# Patient Record
Sex: MALE | Race: WHITE | Employment: OTHER | URBAN - NONMETROPOLITAN AREA
[De-identification: names, ages, dates, MRNs, and addresses within clinical notes are randomized per-mention and may not be internally consistent; named-entity substitution may affect disease eponyms.]

---

## 2017-02-20 ENCOUNTER — OFFICE VISIT (OUTPATIENT)
Dept: FAMILY MEDICINE CLINIC | Age: 62
End: 2017-02-20

## 2017-02-20 VITALS
SYSTOLIC BLOOD PRESSURE: 115 MMHG | HEART RATE: 72 BPM | HEIGHT: 70 IN | WEIGHT: 163 LBS | RESPIRATION RATE: 16 BRPM | BODY MASS INDEX: 23.34 KG/M2 | DIASTOLIC BLOOD PRESSURE: 75 MMHG

## 2017-02-20 DIAGNOSIS — E78.2 HYPERCHOLESTEROLEMIA WITH HYPERTRIGLYCERIDEMIA: ICD-10-CM

## 2017-02-20 DIAGNOSIS — I10 ESSENTIAL HYPERTENSION: Primary | ICD-10-CM

## 2017-02-20 DIAGNOSIS — M79.672 BILATERAL FOOT PAIN: ICD-10-CM

## 2017-02-20 DIAGNOSIS — M79.671 BILATERAL FOOT PAIN: ICD-10-CM

## 2017-02-20 DIAGNOSIS — E53.8 VITAMIN B12 DEFICIENCY NEUROPATHY (HCC): ICD-10-CM

## 2017-02-20 DIAGNOSIS — G63 VITAMIN B12 DEFICIENCY NEUROPATHY (HCC): ICD-10-CM

## 2017-02-20 PROCEDURE — 99214 OFFICE O/P EST MOD 30 MIN: CPT | Performed by: FAMILY MEDICINE

## 2017-02-20 ASSESSMENT — ENCOUNTER SYMPTOMS
WHEEZING: 0
SHORTNESS OF BREATH: 0

## 2017-04-24 ENCOUNTER — OFFICE VISIT (OUTPATIENT)
Dept: FAMILY MEDICINE CLINIC | Age: 62
End: 2017-04-24

## 2017-04-24 VITALS
TEMPERATURE: 97.7 F | BODY MASS INDEX: 23.48 KG/M2 | SYSTOLIC BLOOD PRESSURE: 138 MMHG | RESPIRATION RATE: 16 BRPM | DIASTOLIC BLOOD PRESSURE: 82 MMHG | HEART RATE: 70 BPM | OXYGEN SATURATION: 97 % | HEIGHT: 70 IN | WEIGHT: 164 LBS

## 2017-04-24 DIAGNOSIS — Z12.11 ENCOUNTER FOR SCREENING COLONOSCOPY: ICD-10-CM

## 2017-04-24 DIAGNOSIS — G62.9 PERIPHERAL POLYNEUROPATHY: ICD-10-CM

## 2017-04-24 DIAGNOSIS — I10 ESSENTIAL HYPERTENSION: ICD-10-CM

## 2017-04-24 DIAGNOSIS — E78.2 HYPERCHOLESTEROLEMIA WITH HYPERTRIGLYCERIDEMIA: Primary | ICD-10-CM

## 2017-04-24 PROCEDURE — 99214 OFFICE O/P EST MOD 30 MIN: CPT | Performed by: FAMILY MEDICINE

## 2017-04-24 RX ORDER — AMLODIPINE BESYLATE 5 MG/1
5 TABLET ORAL DAILY
Qty: 30 TABLET | Refills: 5 | Status: SHIPPED | OUTPATIENT
Start: 2017-04-24 | End: 2017-11-02 | Stop reason: SDUPTHER

## 2017-04-24 RX ORDER — FENOFIBRATE 160 MG/1
160 TABLET ORAL DAILY
Qty: 30 TABLET | Refills: 5 | Status: SHIPPED | OUTPATIENT
Start: 2017-04-24 | End: 2017-11-02 | Stop reason: SDUPTHER

## 2017-04-24 RX ORDER — ATORVASTATIN CALCIUM 80 MG/1
80 TABLET, FILM COATED ORAL NIGHTLY
Qty: 30 TABLET | Refills: 5 | Status: SHIPPED | OUTPATIENT
Start: 2017-04-24 | End: 2017-11-02 | Stop reason: SDUPTHER

## 2017-04-24 RX ORDER — PSYLLIUM HUSK 3.4 G/7G
1 POWDER ORAL DAILY
Qty: 30 TABLET | Refills: 5 | Status: SHIPPED | OUTPATIENT
Start: 2017-04-24 | End: 2017-11-02 | Stop reason: SDUPTHER

## 2017-04-24 ASSESSMENT — ENCOUNTER SYMPTOMS
SHORTNESS OF BREATH: 0
WHEEZING: 0
NAUSEA: 0
ABDOMINAL PAIN: 0

## 2017-11-02 ENCOUNTER — OFFICE VISIT (OUTPATIENT)
Dept: FAMILY MEDICINE CLINIC | Age: 62
End: 2017-11-02
Payer: COMMERCIAL

## 2017-11-02 VITALS
HEIGHT: 70 IN | HEART RATE: 65 BPM | WEIGHT: 164 LBS | DIASTOLIC BLOOD PRESSURE: 70 MMHG | SYSTOLIC BLOOD PRESSURE: 128 MMHG | RESPIRATION RATE: 16 BRPM | BODY MASS INDEX: 23.48 KG/M2

## 2017-11-02 DIAGNOSIS — I10 ESSENTIAL HYPERTENSION: Primary | ICD-10-CM

## 2017-11-02 DIAGNOSIS — G62.9 PERIPHERAL POLYNEUROPATHY: ICD-10-CM

## 2017-11-02 DIAGNOSIS — E53.8 VITAMIN B12 DEFICIENCY NEUROPATHY (HCC): ICD-10-CM

## 2017-11-02 DIAGNOSIS — E78.2 HYPERCHOLESTEROLEMIA WITH HYPERTRIGLYCERIDEMIA: ICD-10-CM

## 2017-11-02 DIAGNOSIS — Z12.11 COLON CANCER SCREENING: ICD-10-CM

## 2017-11-02 DIAGNOSIS — L50.9 URTICARIA: ICD-10-CM

## 2017-11-02 DIAGNOSIS — G63 VITAMIN B12 DEFICIENCY NEUROPATHY (HCC): ICD-10-CM

## 2017-11-02 PROCEDURE — G0444 DEPRESSION SCREEN ANNUAL: HCPCS | Performed by: NURSE PRACTITIONER

## 2017-11-02 PROCEDURE — 99214 OFFICE O/P EST MOD 30 MIN: CPT | Performed by: NURSE PRACTITIONER

## 2017-11-02 RX ORDER — FENOFIBRATE 160 MG/1
160 TABLET ORAL DAILY
Qty: 30 TABLET | Refills: 5 | Status: SHIPPED | OUTPATIENT
Start: 2017-11-02 | End: 2018-04-27 | Stop reason: SDUPTHER

## 2017-11-02 RX ORDER — PSYLLIUM HUSK 3.4 G/7G
1 POWDER ORAL DAILY
Qty: 30 TABLET | Refills: 5 | Status: SHIPPED | OUTPATIENT
Start: 2017-11-02 | End: 2018-04-28 | Stop reason: SDUPTHER

## 2017-11-02 RX ORDER — AMLODIPINE BESYLATE 5 MG/1
5 TABLET ORAL DAILY
Qty: 30 TABLET | Refills: 5 | Status: SHIPPED | OUTPATIENT
Start: 2017-11-02 | End: 2018-04-27 | Stop reason: SDUPTHER

## 2017-11-02 RX ORDER — ATORVASTATIN CALCIUM 80 MG/1
80 TABLET, FILM COATED ORAL NIGHTLY
Qty: 30 TABLET | Refills: 5 | Status: SHIPPED | OUTPATIENT
Start: 2017-11-02 | End: 2018-04-27 | Stop reason: SDUPTHER

## 2017-11-02 ASSESSMENT — PATIENT HEALTH QUESTIONNAIRE - PHQ9
5. POOR APPETITE OR OVEREATING: 3
9. THOUGHTS THAT YOU WOULD BE BETTER OFF DEAD, OR OF HURTING YOURSELF: 0
SUM OF ALL RESPONSES TO PHQ9 QUESTIONS 1 & 2: 3
6. FEELING BAD ABOUT YOURSELF - OR THAT YOU ARE A FAILURE OR HAVE LET YOURSELF OR YOUR FAMILY DOWN: 0
1. LITTLE INTEREST OR PLEASURE IN DOING THINGS: 0
8. MOVING OR SPEAKING SO SLOWLY THAT OTHER PEOPLE COULD HAVE NOTICED. OR THE OPPOSITE, BEING SO FIGETY OR RESTLESS THAT YOU HAVE BEEN MOVING AROUND A LOT MORE THAN USUAL: 0
2. FEELING DOWN, DEPRESSED OR HOPELESS: 3
3. TROUBLE FALLING OR STAYING ASLEEP: 3
SUM OF ALL RESPONSES TO PHQ QUESTIONS 1-9: 12
7. TROUBLE CONCENTRATING ON THINGS, SUCH AS READING THE NEWSPAPER OR WATCHING TELEVISION: 0
4. FEELING TIRED OR HAVING LITTLE ENERGY: 3

## 2017-11-02 ASSESSMENT — ENCOUNTER SYMPTOMS
ABDOMINAL PAIN: 0
DIARRHEA: 0
CONSTIPATION: 0
EYE DISCHARGE: 0
VOMITING: 0
RHINORRHEA: 0
CHEST TIGHTNESS: 0
SHORTNESS OF BREATH: 0
COUGH: 0

## 2017-11-02 NOTE — PROGRESS NOTES
[facility] PROFESSIONAL SERVS  [facility]  [address] [place] [address]  Dept: [phone]  Dept Fax: [phone]  Loc: [phone]    Scarlet Morley is a 60 y.o. male who presents today for his medical conditions/complaints as noted below. Chief Complaint   Patient presents with    Medication Refill    Other     odor with sweating and itching at night time       HPI:     States majority of his health issues were connected to after he was told he had a TIA years ago. That time he was diagnosed with hypertension. Was started on Norvasc. Denies any chest pain, palpitations, shortness of breath, fatigue. High cholesterol. Was discovered years ago. Is taking a statin and fenofibrate. As well as an omega-3 Fish oil. Bilateral foot pain. Was treated for neuropathy with Neurontin but this did not help. Then started taking vitamin B12 supplements. This also did not help either. States he has changed his shoes which is helps him the cold feels good as well as pressure on these joints. Is the [employer]. 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 This has been moved here 8 years ago. This can be stressful at times. There had colonoscopy completed. Declines flu shot. Like he has a lot of local family support. 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 Did recently find out that his daughter has cancer denies SI or HI. Urticaria. Onset months ago. Around this time he has started a new shampoo and which cost $50.  Notices majority of his itching and rashes around the nape of his neck. Tried Benadryl which does not relieve symptoms. If that time his hands and his torso are itchy as well. Denies having a rash at this time. No one else in the family has a rash or urticaria.         Medications:    Current Outpatient Prescriptions:     amLODIPine (NORVASC) 5 MG tablet, Take 1 tablet by mouth daily, Disp: 30 tablet, Rfl: 5    atorvastatin (LIPITOR) 80 MG tablet, Take 1 tablet by mouth nightly, Disp: 30 tablet, Rfl: 5    fenofibrate 160 MG tablet, Take 1 tablet by mouth daily, Disp: 30 tablet, Rfl: 5    RA VITAMIN B-12 TR 1000 MCG TBCR, Take 1 tablet by mouth daily, Disp: 30 tablet, Rfl: 5    aspirin 81 MG tablet, Take 81 mg by mouth daily, Disp: , Rfl:     omega-3 acid ethyl esters (LOVAZA) 1 G capsule, Take 2 capsules by mouth 2 times daily, Disp: 60 capsule, Rfl: 3    The patient is allergic to shellfish-derived products. Past Medical History  Manish Rockwell  has a past medical history of Ankylosing spondylitis ([facility] [address] Migraine; and TIA (transient ischemic attack). Past Surgical History  The patient  has a past surgical history that includes knee surgery (1983); Myringotomy Tympanostomy Tube Placement (Left, 04/16/2013); and sinus surgery. Family History  This patient's family history includes Arthritis in his mother. Social History  Manish Rockwell  reports that he has never smoked. He has never used smokeless tobacco. He reports that he drinks alcohol. He reports that he does not use drugs. Health Maintenance  Health Maintenance Due   Topic Date Due    Hepatitis C screen  1955    HIV screen  05/20/1969    DTaP/Tdap/Td vaccine (1 - Tdap) 05/20/1973    Colon cancer screen colonoscopy  05/20/2004    Zostavax vaccine  05/20/2014    Flu vaccine (1) 09/01/2017       Subjective:      Review of Systems   Constitutional: Negative for activity change, appetite change and fever. HENT: Negative for congestion, ear pain and rhinorrhea. Eyes: Negative for discharge and visual disturbance. Respiratory: Negative for cough, chest tightness and shortness of breath. Cardiovascular: Negative for chest pain and palpitations. Gastrointestinal: Negative for abdominal pain, constipation, diarrhea and vomiting. Genitourinary: Negative for difficulty urinating and hematuria.    Musculoskeletal:

## 2017-11-07 LAB
CONTROL: NORMAL
HEMOCCULT STL QL: NORMAL

## 2017-11-07 PROCEDURE — 82274 ASSAY TEST FOR BLOOD FECAL: CPT | Performed by: NURSE PRACTITIONER

## 2017-11-08 ENCOUNTER — NURSE ONLY (OUTPATIENT)
Dept: FAMILY MEDICINE CLINIC | Age: 62
End: 2017-11-08
Payer: COMMERCIAL

## 2017-11-08 DIAGNOSIS — I10 ESSENTIAL HYPERTENSION: ICD-10-CM

## 2017-11-08 DIAGNOSIS — G63 VITAMIN B12 DEFICIENCY NEUROPATHY (HCC): ICD-10-CM

## 2017-11-08 DIAGNOSIS — M79.671 BILATERAL FOOT PAIN: ICD-10-CM

## 2017-11-08 DIAGNOSIS — M79.672 BILATERAL FOOT PAIN: ICD-10-CM

## 2017-11-08 DIAGNOSIS — E53.8 VITAMIN B12 DEFICIENCY NEUROPATHY (HCC): ICD-10-CM

## 2017-11-08 DIAGNOSIS — E78.2 HYPERCHOLESTEROLEMIA WITH HYPERTRIGLYCERIDEMIA: ICD-10-CM

## 2017-11-08 LAB
ALBUMIN SERPL-MCNC: 4.1 G/DL (ref 3.5–5.1)
ALP BLD-CCNC: 67 U/L (ref 38–126)
ALT SERPL-CCNC: 9 U/L (ref 11–66)
ANION GAP SERPL CALCULATED.3IONS-SCNC: 16 MEQ/L (ref 8–16)
AST SERPL-CCNC: 18 U/L (ref 5–40)
BASOPHILS # BLD: 0.6 %
BASOPHILS ABSOLUTE: 0 THOU/MM3 (ref 0–0.1)
BILIRUB SERPL-MCNC: 0.8 MG/DL (ref 0.3–1.2)
BUN BLDV-MCNC: 19 MG/DL (ref 7–22)
CALCIUM SERPL-MCNC: 8.8 MG/DL (ref 8.5–10.5)
CHLORIDE BLD-SCNC: 102 MEQ/L (ref 98–111)
CHOLESTEROL, TOTAL: 208 MG/DL (ref 100–199)
CO2: 24 MEQ/L (ref 23–33)
CREAT SERPL-MCNC: 1.1 MG/DL (ref 0.4–1.2)
EOSINOPHIL # BLD: 4.3 %
EOSINOPHILS ABSOLUTE: 0.3 THOU/MM3 (ref 0–0.4)
FOLATE: 14.5 NG/ML (ref 4.8–24.2)
GFR SERPL CREATININE-BSD FRML MDRD: 68 ML/MIN/1.73M2
GLUCOSE BLD-MCNC: 89 MG/DL (ref 70–108)
GLUCOSE FASTING: 89 MG/DL (ref 70–108)
HCT VFR BLD CALC: 45.7 % (ref 42–52)
HDLC SERPL-MCNC: 35 MG/DL
HEMOGLOBIN: 15 GM/DL (ref 14–18)
LDL CHOLESTEROL CALCULATED: 124 MG/DL
LYMPHOCYTES # BLD: 25.7 %
LYMPHOCYTES ABSOLUTE: 1.9 THOU/MM3 (ref 1–4.8)
MCH RBC QN AUTO: 28 PG (ref 27–31)
MCHC RBC AUTO-ENTMCNC: 32.8 GM/DL (ref 33–37)
MCV RBC AUTO: 85.4 FL (ref 80–94)
MONOCYTES # BLD: 9.5 %
MONOCYTES ABSOLUTE: 0.7 THOU/MM3 (ref 0.4–1.3)
NUCLEATED RED BLOOD CELLS: 0 /100 WBC
PDW BLD-RTO: 14.1 % (ref 11.5–14.5)
PLATELET # BLD: 247 THOU/MM3 (ref 130–400)
PMV BLD AUTO: 10.3 MCM (ref 7.4–10.4)
POTASSIUM SERPL-SCNC: 4.1 MEQ/L (ref 3.5–5.2)
RBC # BLD: 5.35 MILL/MM3 (ref 4.7–6.1)
SEG NEUTROPHILS: 59.9 %
SEGMENTED NEUTROPHILS ABSOLUTE COUNT: 4.3 THOU/MM3 (ref 1.8–7.7)
SODIUM BLD-SCNC: 142 MEQ/L (ref 135–145)
TOTAL PROTEIN: 7.4 G/DL (ref 6.1–8)
TRIGL SERPL-MCNC: 245 MG/DL (ref 0–199)
TSH SERPL DL<=0.05 MIU/L-ACNC: 4.75 UIU/ML (ref 0.4–4.2)
VITAMIN B-12: 533 PG/ML (ref 211–911)
VITAMIN D 25-HYDROXY: 28 NG/ML (ref 30–100)
WBC # BLD: 7.2 THOU/MM3 (ref 4.8–10.8)

## 2017-11-08 PROCEDURE — 36415 COLL VENOUS BLD VENIPUNCTURE: CPT | Performed by: NURSE PRACTITIONER

## 2017-11-13 ENCOUNTER — TELEPHONE (OUTPATIENT)
Dept: FAMILY MEDICINE CLINIC | Age: 62
End: 2017-11-13

## 2017-11-13 DIAGNOSIS — E55.9 VITAMIN D INSUFFICIENCY: Primary | ICD-10-CM

## 2017-11-13 DIAGNOSIS — E78.2 HYPERCHOLESTEROLEMIA WITH HYPERTRIGLYCERIDEMIA: ICD-10-CM

## 2017-11-13 DIAGNOSIS — E03.9 HYPOTHYROIDISM, UNSPECIFIED TYPE: ICD-10-CM

## 2017-11-13 RX ORDER — LEVOTHYROXINE SODIUM 25 MCG
25 TABLET ORAL DAILY
Qty: 30 TABLET | Refills: 2 | Status: SHIPPED | OUTPATIENT
Start: 2017-11-13 | End: 2018-02-14 | Stop reason: SDUPTHER

## 2017-11-13 NOTE — TELEPHONE ENCOUNTER
Vitamin D is slightly low, please take multivitamin OTC. Hypothyroid, will call in supplement and repeat this lab in 6 weeks. Trig still elevated, total cholesterol borderline high, HDL low. Continue current lipitor, fenofibrate and lovaza. Trying to incorporate exercise regimen will help with these values.

## 2017-12-27 ENCOUNTER — OFFICE VISIT (OUTPATIENT)
Dept: FAMILY MEDICINE CLINIC | Age: 62
End: 2017-12-27
Payer: COMMERCIAL

## 2017-12-27 VITALS
SYSTOLIC BLOOD PRESSURE: 138 MMHG | BODY MASS INDEX: 25.43 KG/M2 | TEMPERATURE: 97.7 F | DIASTOLIC BLOOD PRESSURE: 74 MMHG | OXYGEN SATURATION: 98 % | WEIGHT: 167.8 LBS | HEART RATE: 70 BPM | RESPIRATION RATE: 12 BRPM | HEIGHT: 68 IN

## 2017-12-27 DIAGNOSIS — E03.9 HYPOTHYROIDISM, UNSPECIFIED TYPE: ICD-10-CM

## 2017-12-27 DIAGNOSIS — J20.9 ACUTE BRONCHITIS, UNSPECIFIED ORGANISM: Primary | ICD-10-CM

## 2017-12-27 LAB — TSH SERPL DL<=0.05 MIU/L-ACNC: 4.14 UIU/ML (ref 0.4–4.2)

## 2017-12-27 PROCEDURE — 36415 COLL VENOUS BLD VENIPUNCTURE: CPT | Performed by: NURSE PRACTITIONER

## 2017-12-27 PROCEDURE — 99214 OFFICE O/P EST MOD 30 MIN: CPT | Performed by: NURSE PRACTITIONER

## 2017-12-27 RX ORDER — ALBUTEROL SULFATE 90 UG/1
2 AEROSOL, METERED RESPIRATORY (INHALATION) EVERY 6 HOURS PRN
Qty: 1 INHALER | Refills: 3 | Status: SHIPPED | OUTPATIENT
Start: 2017-12-27 | End: 2019-02-01

## 2017-12-27 RX ORDER — AZITHROMYCIN 250 MG/1
TABLET, FILM COATED ORAL
Qty: 6 TABLET | Refills: 0 | Status: SHIPPED | OUTPATIENT
Start: 2017-12-27 | End: 2018-04-27

## 2017-12-27 RX ORDER — BENZONATATE 200 MG/1
200 CAPSULE ORAL 3 TIMES DAILY PRN
Qty: 21 CAPSULE | Refills: 0 | Status: SHIPPED | OUTPATIENT
Start: 2017-12-27 | End: 2018-01-03

## 2017-12-28 ASSESSMENT — ENCOUNTER SYMPTOMS
CONSTIPATION: 0
ABDOMINAL PAIN: 0
EYE DISCHARGE: 0
DIARRHEA: 0
HEMOPTYSIS: 0
SORE THROAT: 0
VOMITING: 0
NAUSEA: 0
SHORTNESS OF BREATH: 1
SINUS PRESSURE: 0
RHINORRHEA: 0
SINUS PAIN: 0
HEARTBURN: 0
WHEEZING: 1
COUGH: 1

## 2017-12-28 NOTE — PROGRESS NOTES
tablet Take 1 tablet by mouth daily Take with water on an empty stomach- wait 30 minutes before eating or taking other meds. 30 tablet 2    amLODIPine (NORVASC) 5 MG tablet Take 1 tablet by mouth daily 30 tablet 5    atorvastatin (LIPITOR) 80 MG tablet Take 1 tablet by mouth nightly 30 tablet 5    fenofibrate 160 MG tablet Take 1 tablet by mouth daily 30 tablet 5    RA VITAMIN B-12 TR 1000 MCG TBCR Take 1 tablet by mouth daily 30 tablet 5    aspirin 81 MG tablet Take 81 mg by mouth daily      omega-3 acid ethyl esters (LOVAZA) 1 G capsule Take 2 capsules by mouth 2 times daily 60 capsule 3     No current facility-administered medications for this visit. Allergies   Allergen Reactions    Shellfish-Derived Products        Subjective:      Review of Systems   Constitutional: Positive for fatigue. Negative for activity change, appetite change, chills, diaphoresis, fever and weight loss. HENT: Positive for congestion and postnasal drip. Negative for ear pain, rhinorrhea, sinus pain, sinus pressure and sore throat. Eyes: Negative for discharge and visual disturbance. Respiratory: Positive for cough, shortness of breath (after coughing) and wheezing. Negative for hemoptysis. Cardiovascular: Negative for chest pain. Gastrointestinal: Negative for abdominal pain, constipation, diarrhea, heartburn, nausea and vomiting. Endocrine: Negative for cold intolerance and heat intolerance. Genitourinary: Negative for decreased urine volume. Musculoskeletal: Positive for myalgias. Negative for neck pain and neck stiffness. Skin: Negative for rash. Neurological: Positive for headaches. Objective:     /74   Pulse 70   Temp 97.7 °F (36.5 °C)   Resp 12   Ht 5' 8\" (1.727 m)   Wt 167 lb 12.8 oz (76.1 kg)   SpO2 98%   BMI 25.51 kg/m²     Physical Exam   Constitutional: He is oriented to person, place, and time. He appears well-developed and well-nourished. No distress.    HENT:   Head: Normocephalic and atraumatic. Right Ear: Tympanic membrane, external ear and ear canal normal. Tympanic membrane is not perforated and not erythematous. No middle ear effusion. Left Ear: Tympanic membrane, external ear and ear canal normal. Tympanic membrane is not perforated and not erythematous. No middle ear effusion. Nose: Mucosal edema present. Right sinus exhibits no maxillary sinus tenderness and no frontal sinus tenderness. Left sinus exhibits no maxillary sinus tenderness and no frontal sinus tenderness. Mouth/Throat: Uvula is midline, oropharynx is clear and moist and mucous membranes are normal.   Eyes: Conjunctivae and EOM are normal.   Neck: Trachea normal, normal range of motion and full passive range of motion without pain. Neck supple. No spinous process tenderness present. No thyroid mass and no thyromegaly present. Cardiovascular: Normal rate, regular rhythm and normal heart sounds. Exam reveals no gallop and no friction rub. No murmur heard. Pulmonary/Chest: Effort normal. No respiratory distress. He has wheezes (cleared with coughing). Abdominal: Soft. Bowel sounds are normal. There is no tenderness. Musculoskeletal: Normal range of motion. Lymphadenopathy:        Head (right side): No submental, no submandibular, no tonsillar, no preauricular, no posterior auricular and no occipital adenopathy present. Head (left side): No submental, no submandibular, no tonsillar, no preauricular, no posterior auricular and no occipital adenopathy present. He has no cervical adenopathy. Neurological: He is alert and oriented to person, place, and time. Skin: Skin is warm and dry. No rash noted. No erythema. Psychiatric: He has a normal mood and affect. His speech is normal and behavior is normal. Thought content normal.   Vitals reviewed. Assessment/Plan:     Joe Dumont was seen today for uri, chest congestion and cough.     Diagnoses and all orders for this visit:    Acute bronchitis, unspecified organism  -     benzonatate (TESSALON) 200 MG capsule; Take 1 capsule by mouth 3 times daily as needed for Cough  -     azithromycin (ZITHROMAX Z-RUSTY) 250 MG tablet; 2 tablets day 1 then1 tablet days 2 - 5.  -     albuterol sulfate HFA (PROAIR HFA) 108 (90 Base) MCG/ACT inhaler; Inhale 2 puffs into the lungs every 6 hours as needed for Wheezing   -rest, increase fluids nazario water   Tylenol or motrin for pain  Hypothyroidism, unspecified type   Reviewed last lab   Continue current dose until lab draw, then will notifiy  -     TSH with Reflex        Return if symptoms worsen or fail to improve. Discussed use, benefit, and side effects of prescribed medications. Barriers to medication compliance addressed. All patient questions answered. Pt voiced understanding.      Electronically signed by Caitlin Lester CNP on 12/28/2017 at 9:27 AM

## 2018-02-14 DIAGNOSIS — E03.9 HYPOTHYROIDISM, UNSPECIFIED TYPE: ICD-10-CM

## 2018-02-14 RX ORDER — LEVOTHYROXINE SODIUM 25 MCG
TABLET ORAL
Qty: 30 TABLET | Refills: 2 | Status: CANCELLED | OUTPATIENT
Start: 2018-02-14

## 2018-02-14 RX ORDER — LEVOTHYROXINE SODIUM 0.03 MG/1
25 TABLET ORAL DAILY
Qty: 30 TABLET | Refills: 3 | Status: SHIPPED | OUTPATIENT
Start: 2018-02-14 | End: 2018-04-27 | Stop reason: SDUPTHER

## 2018-04-27 ENCOUNTER — OFFICE VISIT (OUTPATIENT)
Dept: FAMILY MEDICINE CLINIC | Age: 63
End: 2018-04-27
Payer: COMMERCIAL

## 2018-04-27 VITALS
BODY MASS INDEX: 25.42 KG/M2 | HEART RATE: 67 BPM | WEIGHT: 167.2 LBS | SYSTOLIC BLOOD PRESSURE: 128 MMHG | RESPIRATION RATE: 16 BRPM | DIASTOLIC BLOOD PRESSURE: 82 MMHG | OXYGEN SATURATION: 98 %

## 2018-04-27 DIAGNOSIS — I10 ESSENTIAL HYPERTENSION: ICD-10-CM

## 2018-04-27 DIAGNOSIS — E78.2 HYPERCHOLESTEROLEMIA WITH HYPERTRIGLYCERIDEMIA: ICD-10-CM

## 2018-04-27 DIAGNOSIS — E03.9 HYPOTHYROIDISM, UNSPECIFIED TYPE: ICD-10-CM

## 2018-04-27 DIAGNOSIS — J02.9 PHARYNGITIS, UNSPECIFIED ETIOLOGY: ICD-10-CM

## 2018-04-27 DIAGNOSIS — Z13.31 POSITIVE DEPRESSION SCREENING: Primary | ICD-10-CM

## 2018-04-27 PROCEDURE — G8431 POS CLIN DEPRES SCRN F/U DOC: HCPCS | Performed by: NURSE PRACTITIONER

## 2018-04-27 PROCEDURE — 99214 OFFICE O/P EST MOD 30 MIN: CPT | Performed by: NURSE PRACTITIONER

## 2018-04-27 RX ORDER — LEVOTHYROXINE SODIUM 0.03 MG/1
25 TABLET ORAL DAILY
Qty: 30 TABLET | Refills: 3 | Status: SHIPPED | OUTPATIENT
Start: 2018-04-27 | End: 2018-10-30 | Stop reason: SDUPTHER

## 2018-04-27 RX ORDER — FENOFIBRATE 160 MG/1
160 TABLET ORAL DAILY
Qty: 30 TABLET | Refills: 5 | Status: SHIPPED | OUTPATIENT
Start: 2018-04-27 | End: 2018-10-30 | Stop reason: SDUPTHER

## 2018-04-27 RX ORDER — ATORVASTATIN CALCIUM 80 MG/1
80 TABLET, FILM COATED ORAL NIGHTLY
Qty: 30 TABLET | Refills: 5 | Status: SHIPPED | OUTPATIENT
Start: 2018-04-27 | End: 2019-05-22 | Stop reason: SDUPTHER

## 2018-04-27 RX ORDER — AMLODIPINE BESYLATE 5 MG/1
5 TABLET ORAL DAILY
Qty: 30 TABLET | Refills: 5 | Status: SHIPPED | OUTPATIENT
Start: 2018-04-27 | End: 2018-10-30 | Stop reason: SDUPTHER

## 2018-04-27 RX ORDER — AMOXICILLIN 875 MG/1
875 TABLET, COATED ORAL 2 TIMES DAILY
Qty: 20 TABLET | Refills: 0 | Status: SHIPPED | OUTPATIENT
Start: 2018-04-27 | End: 2018-05-07

## 2018-04-28 DIAGNOSIS — G62.9 PERIPHERAL POLYNEUROPATHY: ICD-10-CM

## 2018-05-01 RX ORDER — PSYLLIUM HUSK 3.4 G/7G
POWDER ORAL
Qty: 30 TABLET | Refills: 5 | Status: SHIPPED | OUTPATIENT
Start: 2018-05-01 | End: 2019-11-21

## 2018-05-02 ASSESSMENT — ENCOUNTER SYMPTOMS
VOICE CHANGE: 0
EYE DISCHARGE: 0
SINUS PAIN: 0
RHINORRHEA: 0
TROUBLE SWALLOWING: 0
SORE THROAT: 1
SINUS PRESSURE: 0
NAUSEA: 0
SWOLLEN GLANDS: 1
VISUAL CHANGE: 0
VOMITING: 0
ABDOMINAL PAIN: 0
COUGH: 0
WHEEZING: 0

## 2018-10-30 DIAGNOSIS — I10 ESSENTIAL HYPERTENSION: ICD-10-CM

## 2018-10-30 DIAGNOSIS — E03.9 HYPOTHYROIDISM, UNSPECIFIED TYPE: ICD-10-CM

## 2018-10-30 DIAGNOSIS — E78.2 HYPERCHOLESTEROLEMIA WITH HYPERTRIGLYCERIDEMIA: ICD-10-CM

## 2018-10-30 RX ORDER — AMLODIPINE BESYLATE 5 MG/1
5 TABLET ORAL DAILY
Qty: 30 TABLET | Refills: 5 | Status: SHIPPED | OUTPATIENT
Start: 2018-10-30 | End: 2019-05-01 | Stop reason: SDUPTHER

## 2018-10-30 RX ORDER — LEVOTHYROXINE SODIUM 0.03 MG/1
25 TABLET ORAL DAILY
Qty: 30 TABLET | Refills: 3 | Status: SHIPPED | OUTPATIENT
Start: 2018-10-30 | End: 2019-02-28 | Stop reason: SDUPTHER

## 2018-10-30 RX ORDER — FENOFIBRATE 160 MG/1
160 TABLET ORAL DAILY
Qty: 30 TABLET | Refills: 5 | Status: SHIPPED | OUTPATIENT
Start: 2018-10-30 | End: 2018-12-19

## 2018-12-19 ENCOUNTER — OFFICE VISIT (OUTPATIENT)
Dept: FAMILY MEDICINE CLINIC | Age: 63
End: 2018-12-19
Payer: COMMERCIAL

## 2018-12-19 VITALS
BODY MASS INDEX: 25.94 KG/M2 | OXYGEN SATURATION: 98 % | DIASTOLIC BLOOD PRESSURE: 80 MMHG | SYSTOLIC BLOOD PRESSURE: 138 MMHG | WEIGHT: 170.6 LBS | RESPIRATION RATE: 16 BRPM | HEART RATE: 65 BPM

## 2018-12-19 DIAGNOSIS — G63 VITAMIN B12 DEFICIENCY NEUROPATHY (HCC): ICD-10-CM

## 2018-12-19 DIAGNOSIS — I10 ESSENTIAL HYPERTENSION: ICD-10-CM

## 2018-12-19 DIAGNOSIS — E78.2 HYPERCHOLESTEROLEMIA WITH HYPERTRIGLYCERIDEMIA: ICD-10-CM

## 2018-12-19 DIAGNOSIS — E53.8 VITAMIN B12 DEFICIENCY NEUROPATHY (HCC): ICD-10-CM

## 2018-12-19 DIAGNOSIS — E03.9 HYPOTHYROIDISM, UNSPECIFIED TYPE: ICD-10-CM

## 2018-12-19 DIAGNOSIS — H65.05 RECURRENT ACUTE SEROUS OTITIS MEDIA OF LEFT EAR: Primary | ICD-10-CM

## 2018-12-19 PROCEDURE — 99214 OFFICE O/P EST MOD 30 MIN: CPT | Performed by: NURSE PRACTITIONER

## 2018-12-19 RX ORDER — AMOXICILLIN 875 MG/1
875 TABLET, COATED ORAL 2 TIMES DAILY
Qty: 20 TABLET | Refills: 0 | Status: SHIPPED | OUTPATIENT
Start: 2018-12-19 | End: 2018-12-29

## 2018-12-19 ASSESSMENT — ENCOUNTER SYMPTOMS
SHORTNESS OF BREATH: 0
SORE THROAT: 1
DIARRHEA: 0
COUGH: 0
ABDOMINAL PAIN: 0
SINUS PAIN: 1
CHEST TIGHTNESS: 0
SINUS PRESSURE: 1
EYE DISCHARGE: 0
RHINORRHEA: 1
VOMITING: 0
CONSTIPATION: 0

## 2018-12-19 ASSESSMENT — PATIENT HEALTH QUESTIONNAIRE - PHQ9
SUM OF ALL RESPONSES TO PHQ QUESTIONS 1-9: 0
SUM OF ALL RESPONSES TO PHQ9 QUESTIONS 1 & 2: 0
1. LITTLE INTEREST OR PLEASURE IN DOING THINGS: 0
SUM OF ALL RESPONSES TO PHQ QUESTIONS 1-9: 0
2. FEELING DOWN, DEPRESSED OR HOPELESS: 0

## 2018-12-19 NOTE — PROGRESS NOTES
[facility] MEDICINE  [email or web address] [place] [address]  Dept: [phone]  Dept Fax: [phone]  Loc: [phone]    Caty Fried is a 58 y.o. male who presents today for his medical conditions/complaintsas noted below. Chief Complaint   Patient presents with    Dizziness     started the same time ears did     Otalgia     started 2-3 days ago       HPI:     Left ear pain. Onset days ago. Congestion. Muffled hearing. PND. Facial pressure. Did have a history of ear infections. States he is retiring in May after 42 years. HTN. Onset years ago. Is taking norvasc. Denies CP or palpitations. Hypothyroidism. Onset years ago. Is taking a supplement. Hyperlipidemia. Is taking a statin. Is not taking fenofibrate. Current Outpatient Prescriptions   Medication Sig Dispense Refill    amoxicillin (AMOXIL) 875 MG tablet Take 1 tablet by mouth 2 times daily for 10 days 20 tablet 0    amLODIPine (NORVASC) 5 MG tablet Take 1 tablet by mouth daily 30 tablet 5    levothyroxine (SYNTHROID) 25 MCG tablet Take 1 tablet by mouth daily Take with water on an empty stomach- wait 30 minutes before eating or taking other meds. 30 tablet 3    RA VITAMIN B-12 TR 1000 MCG TBCR take 1 tablet by mouth once daily 30 tablet 5    atorvastatin (LIPITOR) 80 MG tablet Take 1 tablet by mouth nightly 30 tablet 5    albuterol sulfate HFA (PROAIR HFA) 108 (90 Base) MCG/ACT inhaler Inhale 2 puffs into the lungs every 6 hours as needed for Wheezing 1 Inhaler 3    aspirin 81 MG tablet Take 81 mg by mouth daily      omega-3 acid ethyl esters (LOVAZA) 1 G capsule Take 2 capsules by mouth 2 times daily 60 capsule 3     No current facility-administered medications for this visit. Allergies   Allergen Reactions    Shellfish-Derived Products        Subjective:      Review of Systems   Constitutional: Positive for fatigue.  Negative for activity change, appetite change and

## 2019-02-01 ENCOUNTER — OFFICE VISIT (OUTPATIENT)
Dept: FAMILY MEDICINE CLINIC | Age: 64
End: 2019-02-01
Payer: COMMERCIAL

## 2019-02-01 VITALS
BODY MASS INDEX: 24.48 KG/M2 | SYSTOLIC BLOOD PRESSURE: 138 MMHG | OXYGEN SATURATION: 98 % | HEART RATE: 61 BPM | RESPIRATION RATE: 14 BRPM | DIASTOLIC BLOOD PRESSURE: 78 MMHG | WEIGHT: 171 LBS | HEIGHT: 70 IN

## 2019-02-01 DIAGNOSIS — R10.9 LT FLANK PAIN: Primary | ICD-10-CM

## 2019-02-01 DIAGNOSIS — E53.8 VITAMIN B12 DEFICIENCY NEUROPATHY (HCC): ICD-10-CM

## 2019-02-01 DIAGNOSIS — G63 VITAMIN B12 DEFICIENCY NEUROPATHY (HCC): ICD-10-CM

## 2019-02-01 DIAGNOSIS — I10 ESSENTIAL HYPERTENSION: ICD-10-CM

## 2019-02-01 LAB
ALBUMIN SERPL-MCNC: 4.4 G/DL (ref 3.5–5.1)
ALP BLD-CCNC: 53 U/L (ref 38–126)
ALT SERPL-CCNC: 14 U/L (ref 11–66)
ANION GAP SERPL CALCULATED.3IONS-SCNC: 16 MEQ/L (ref 8–16)
AST SERPL-CCNC: 23 U/L (ref 5–40)
BASOPHILS # BLD: 0.5 %
BASOPHILS ABSOLUTE: 0 THOU/MM3 (ref 0–0.1)
BILIRUB SERPL-MCNC: 0.7 MG/DL (ref 0.3–1.2)
BILIRUBIN, POC: NORMAL
BLOOD URINE, POC: NORMAL
BUN BLDV-MCNC: 16 MG/DL (ref 7–22)
CALCIUM SERPL-MCNC: 9.2 MG/DL (ref 8.5–10.5)
CHLORIDE BLD-SCNC: 105 MEQ/L (ref 98–111)
CHOLESTEROL, TOTAL: 268 MG/DL (ref 100–199)
CLARITY, POC: CLEAR
CO2: 21 MEQ/L (ref 23–33)
COLOR, POC: YELLOW
CREAT SERPL-MCNC: 1.1 MG/DL (ref 0.4–1.2)
EOSINOPHIL # BLD: 3.1 %
EOSINOPHILS ABSOLUTE: 0.3 THOU/MM3 (ref 0–0.4)
ERYTHROCYTE [DISTWIDTH] IN BLOOD BY AUTOMATED COUNT: 12.8 % (ref 11.5–14.5)
ERYTHROCYTE [DISTWIDTH] IN BLOOD BY AUTOMATED COUNT: 39.1 FL (ref 35–45)
FOLATE: 13.1 NG/ML (ref 4.8–24.2)
GFR SERPL CREATININE-BSD FRML MDRD: 67 ML/MIN/1.73M2
GLUCOSE FASTING: 80 MG/DL (ref 70–108)
GLUCOSE URINE, POC: NORMAL
HCT VFR BLD CALC: 46.1 % (ref 42–52)
HDLC SERPL-MCNC: 33 MG/DL
HEMOGLOBIN: 15.4 GM/DL (ref 14–18)
IMMATURE GRANS (ABS): 0.02 THOU/MM3 (ref 0–0.07)
IMMATURE GRANULOCYTES: 0.2 %
KETONES, POC: NORMAL
LDL CHOLESTEROL CALCULATED: 176 MG/DL
LEUKOCYTE EST, POC: NORMAL
LYMPHOCYTES # BLD: 23.6 %
LYMPHOCYTES ABSOLUTE: 1.9 THOU/MM3 (ref 1–4.8)
MCH RBC QN AUTO: 28.3 PG (ref 26–33)
MCHC RBC AUTO-ENTMCNC: 33.4 GM/DL (ref 32.2–35.5)
MCV RBC AUTO: 84.7 FL (ref 80–94)
MONOCYTES # BLD: 8.9 %
MONOCYTES ABSOLUTE: 0.7 THOU/MM3 (ref 0.4–1.3)
NITRITE, POC: NORMAL
NUCLEATED RED BLOOD CELLS: 0 /100 WBC
PH, POC: 5.5
PLATELET # BLD: 256 THOU/MM3 (ref 130–400)
PMV BLD AUTO: 11.3 FL (ref 9.4–12.4)
POTASSIUM SERPL-SCNC: 3.9 MEQ/L (ref 3.5–5.2)
PROTEIN, POC: NORMAL
RBC # BLD: 5.44 MILL/MM3 (ref 4.7–6.1)
SEG NEUTROPHILS: 63.7 %
SEGMENTED NEUTROPHILS ABSOLUTE COUNT: 5.2 THOU/MM3 (ref 1.8–7.7)
SODIUM BLD-SCNC: 142 MEQ/L (ref 135–145)
SPECIFIC GRAVITY, POC: 1.02
TOTAL PROTEIN: 7.6 G/DL (ref 6.1–8)
TRIGL SERPL-MCNC: 295 MG/DL (ref 0–199)
TSH SERPL DL<=0.05 MIU/L-ACNC: 3.41 UIU/ML (ref 0.4–4.2)
UROBILINOGEN, POC: 0.2
VITAMIN B-12: 457 PG/ML (ref 211–911)
VITAMIN D 25-HYDROXY: 25 NG/ML (ref 30–100)
WBC # BLD: 8.1 THOU/MM3 (ref 4.8–10.8)

## 2019-02-01 PROCEDURE — 36415 COLL VENOUS BLD VENIPUNCTURE: CPT | Performed by: NURSE PRACTITIONER

## 2019-02-01 PROCEDURE — 99213 OFFICE O/P EST LOW 20 MIN: CPT | Performed by: NURSE PRACTITIONER

## 2019-02-01 PROCEDURE — 81003 URINALYSIS AUTO W/O SCOPE: CPT | Performed by: NURSE PRACTITIONER

## 2019-02-01 ASSESSMENT — ENCOUNTER SYMPTOMS
SHORTNESS OF BREATH: 0
SINUS PAIN: 0
PHOTOPHOBIA: 0
COUGH: 0
CHEST TIGHTNESS: 0
COLOR CHANGE: 0
VOMITING: 0
ABDOMINAL DISTENTION: 0
BLOOD IN STOOL: 0
SORE THROAT: 0
DIARRHEA: 0
RHINORRHEA: 0
CONSTIPATION: 0
NAUSEA: 0
EYE ITCHING: 0
TROUBLE SWALLOWING: 0
ABDOMINAL PAIN: 0
EYE REDNESS: 0
BACK PAIN: 0
SINUS PRESSURE: 0
EYE PAIN: 0
WHEEZING: 0
EYE DISCHARGE: 0

## 2019-02-12 ENCOUNTER — OFFICE VISIT (OUTPATIENT)
Dept: FAMILY MEDICINE CLINIC | Age: 64
End: 2019-02-12
Payer: COMMERCIAL

## 2019-02-12 VITALS
OXYGEN SATURATION: 98 % | HEIGHT: 70 IN | SYSTOLIC BLOOD PRESSURE: 132 MMHG | RESPIRATION RATE: 12 BRPM | DIASTOLIC BLOOD PRESSURE: 78 MMHG | WEIGHT: 172.8 LBS | HEART RATE: 67 BPM | BODY MASS INDEX: 24.74 KG/M2

## 2019-02-12 DIAGNOSIS — E03.9 HYPOTHYROIDISM, UNSPECIFIED TYPE: ICD-10-CM

## 2019-02-12 DIAGNOSIS — M45.6 ANKYLOSING SPONDYLITIS OF LUMBAR REGION (HCC): ICD-10-CM

## 2019-02-12 DIAGNOSIS — H90.3 SENSORINEURAL HEARING LOSS (SNHL) OF BOTH EARS: ICD-10-CM

## 2019-02-12 DIAGNOSIS — Z79.1 ENCOUNTER FOR MONITORING CHRONIC NSAID THERAPY: ICD-10-CM

## 2019-02-12 DIAGNOSIS — F32.1 CURRENT MODERATE EPISODE OF MAJOR DEPRESSIVE DISORDER WITHOUT PRIOR EPISODE (HCC): ICD-10-CM

## 2019-02-12 DIAGNOSIS — Z00.00 ANNUAL PHYSICAL EXAM: Primary | ICD-10-CM

## 2019-02-12 DIAGNOSIS — G62.9 PERIPHERAL POLYNEUROPATHY: ICD-10-CM

## 2019-02-12 DIAGNOSIS — Z51.81 ENCOUNTER FOR MONITORING CHRONIC NSAID THERAPY: ICD-10-CM

## 2019-02-12 DIAGNOSIS — E78.2 HYPERCHOLESTEROLEMIA WITH HYPERTRIGLYCERIDEMIA: ICD-10-CM

## 2019-02-12 DIAGNOSIS — J32.1 CHRONIC FRONTAL SINUSITIS: ICD-10-CM

## 2019-02-12 PROCEDURE — 99396 PREV VISIT EST AGE 40-64: CPT | Performed by: NURSE PRACTITIONER

## 2019-02-12 RX ORDER — FLUTICASONE PROPIONATE 50 MCG
2 SPRAY, SUSPENSION (ML) NASAL DAILY
Qty: 1 BOTTLE | Refills: 0 | Status: SHIPPED | OUTPATIENT
Start: 2019-02-12 | End: 2019-09-19 | Stop reason: ALTCHOICE

## 2019-02-12 RX ORDER — MELOXICAM 15 MG/1
15 TABLET ORAL DAILY
Qty: 30 TABLET | Refills: 3 | Status: SHIPPED | OUTPATIENT
Start: 2019-02-12 | End: 2019-09-19 | Stop reason: ALTCHOICE

## 2019-02-12 RX ORDER — AZITHROMYCIN 250 MG/1
TABLET, FILM COATED ORAL
Qty: 6 TABLET | Refills: 0 | Status: SHIPPED | OUTPATIENT
Start: 2019-02-12 | End: 2019-03-20 | Stop reason: ALTCHOICE

## 2019-02-12 RX ORDER — OMEPRAZOLE 20 MG/1
20 CAPSULE, DELAYED RELEASE ORAL DAILY
Qty: 90 CAPSULE | Refills: 1 | Status: SHIPPED | OUTPATIENT
Start: 2019-02-12 | End: 2019-11-21

## 2019-02-12 RX ORDER — OMEPRAZOLE 20 MG/1
20 TABLET, DELAYED RELEASE ORAL
Qty: 30 TABLET | Refills: 3 | Status: SHIPPED | COMMUNITY
Start: 2019-02-12 | End: 2019-02-12 | Stop reason: ALTCHOICE

## 2019-02-12 ASSESSMENT — ENCOUNTER SYMPTOMS
VOICE CHANGE: 0
VOMITING: 0
RHINORRHEA: 1
ABDOMINAL PAIN: 0
COUGH: 0
SORE THROAT: 1
EYE DISCHARGE: 0
TROUBLE SWALLOWING: 0
WHEEZING: 0
SINUS PAIN: 1
NAUSEA: 0
SINUS PRESSURE: 1

## 2019-02-12 ASSESSMENT — PATIENT HEALTH QUESTIONNAIRE - PHQ9: DEPRESSION UNABLE TO ASSESS: PT REFUSES

## 2019-02-28 DIAGNOSIS — E03.9 HYPOTHYROIDISM, UNSPECIFIED TYPE: ICD-10-CM

## 2019-02-28 RX ORDER — LEVOTHYROXINE SODIUM 0.03 MG/1
TABLET ORAL
Qty: 30 TABLET | Refills: 3 | Status: SHIPPED | OUTPATIENT
Start: 2019-02-28 | End: 2019-05-22 | Stop reason: SDUPTHER

## 2019-03-20 ENCOUNTER — OFFICE VISIT (OUTPATIENT)
Dept: FAMILY MEDICINE CLINIC | Age: 64
End: 2019-03-20
Payer: COMMERCIAL

## 2019-03-20 VITALS
RESPIRATION RATE: 16 BRPM | WEIGHT: 172 LBS | HEART RATE: 75 BPM | OXYGEN SATURATION: 98 % | DIASTOLIC BLOOD PRESSURE: 72 MMHG | BODY MASS INDEX: 24.68 KG/M2 | SYSTOLIC BLOOD PRESSURE: 130 MMHG

## 2019-03-20 DIAGNOSIS — E03.9 HYPOTHYROIDISM, UNSPECIFIED TYPE: Primary | ICD-10-CM

## 2019-03-20 DIAGNOSIS — M45.6 ANKYLOSING SPONDYLITIS OF LUMBAR REGION (HCC): ICD-10-CM

## 2019-03-20 DIAGNOSIS — F32.1 CURRENT MODERATE EPISODE OF MAJOR DEPRESSIVE DISORDER WITHOUT PRIOR EPISODE (HCC): ICD-10-CM

## 2019-03-20 DIAGNOSIS — E78.2 HYPERCHOLESTEROLEMIA WITH HYPERTRIGLYCERIDEMIA: ICD-10-CM

## 2019-03-20 PROCEDURE — 99214 OFFICE O/P EST MOD 30 MIN: CPT | Performed by: NURSE PRACTITIONER

## 2019-03-21 ASSESSMENT — ENCOUNTER SYMPTOMS
COUGH: 0
RHINORRHEA: 0
VOICE CHANGE: 0
SORE THROAT: 0
NAUSEA: 0
WHEEZING: 0
SINUS PAIN: 0
EYE DISCHARGE: 0
TROUBLE SWALLOWING: 0
SINUS PRESSURE: 0
ABDOMINAL PAIN: 0
VOMITING: 0

## 2019-03-29 ENCOUNTER — TELEPHONE (OUTPATIENT)
Dept: FAMILY MEDICINE CLINIC | Age: 64
End: 2019-03-29

## 2019-04-06 DIAGNOSIS — F32.1 CURRENT MODERATE EPISODE OF MAJOR DEPRESSIVE DISORDER WITHOUT PRIOR EPISODE (HCC): ICD-10-CM

## 2019-05-22 DIAGNOSIS — E78.2 HYPERCHOLESTEROLEMIA WITH HYPERTRIGLYCERIDEMIA: ICD-10-CM

## 2019-05-22 DIAGNOSIS — E03.9 HYPOTHYROIDISM, UNSPECIFIED TYPE: ICD-10-CM

## 2019-05-22 RX ORDER — LEVOTHYROXINE SODIUM 0.03 MG/1
TABLET ORAL
Qty: 30 TABLET | Refills: 3 | Status: SHIPPED | OUTPATIENT
Start: 2019-05-22 | End: 2019-05-23 | Stop reason: SDUPTHER

## 2019-05-22 RX ORDER — ATORVASTATIN CALCIUM 80 MG/1
80 TABLET, FILM COATED ORAL NIGHTLY
Qty: 30 TABLET | Refills: 5 | Status: SHIPPED | OUTPATIENT
Start: 2019-05-22 | End: 2019-05-23 | Stop reason: SDUPTHER

## 2019-05-22 NOTE — TELEPHONE ENCOUNTER
5/22/19   Marni Weston called requesting a refill on the following medications:  Requested Prescriptions     Pending Prescriptions Disp Refills    levothyroxine (SYNTHROID) 25 MCG tablet 30 tablet 3    atorvastatin (LIPITOR) 80 MG tablet 30 tablet 5     Sig: Take 1 tablet by mouth nightly     Pharmacy verified: [facility]      Date of last visit:  3/20/19  Date of next visit (if applicable): 7/89/23  blm

## 2019-05-23 ENCOUNTER — OFFICE VISIT (OUTPATIENT)
Dept: FAMILY MEDICINE CLINIC | Age: 64
End: 2019-05-23
Payer: MEDICARE

## 2019-05-23 VITALS
SYSTOLIC BLOOD PRESSURE: 150 MMHG | WEIGHT: 170 LBS | OXYGEN SATURATION: 98 % | HEART RATE: 85 BPM | BODY MASS INDEX: 24.39 KG/M2 | DIASTOLIC BLOOD PRESSURE: 72 MMHG | RESPIRATION RATE: 16 BRPM

## 2019-05-23 DIAGNOSIS — E03.9 HYPOTHYROIDISM, UNSPECIFIED TYPE: ICD-10-CM

## 2019-05-23 DIAGNOSIS — E78.2 HYPERCHOLESTEROLEMIA WITH HYPERTRIGLYCERIDEMIA: ICD-10-CM

## 2019-05-23 DIAGNOSIS — I10 ESSENTIAL HYPERTENSION: ICD-10-CM

## 2019-05-23 PROCEDURE — 1123F ACP DISCUSS/DSCN MKR DOCD: CPT | Performed by: NURSE PRACTITIONER

## 2019-05-23 PROCEDURE — 1036F TOBACCO NON-USER: CPT | Performed by: NURSE PRACTITIONER

## 2019-05-23 PROCEDURE — G8427 DOCREV CUR MEDS BY ELIG CLIN: HCPCS | Performed by: NURSE PRACTITIONER

## 2019-05-23 PROCEDURE — 4040F PNEUMOC VAC/ADMIN/RCVD: CPT | Performed by: NURSE PRACTITIONER

## 2019-05-23 PROCEDURE — 3017F COLORECTAL CA SCREEN DOC REV: CPT | Performed by: NURSE PRACTITIONER

## 2019-05-23 PROCEDURE — G8420 CALC BMI NORM PARAMETERS: HCPCS | Performed by: NURSE PRACTITIONER

## 2019-05-23 PROCEDURE — 99214 OFFICE O/P EST MOD 30 MIN: CPT | Performed by: NURSE PRACTITIONER

## 2019-05-23 RX ORDER — LEVOTHYROXINE SODIUM 0.03 MG/1
TABLET ORAL
Qty: 90 TABLET | Refills: 3 | Status: SHIPPED | OUTPATIENT
Start: 2019-05-23 | End: 2020-04-09 | Stop reason: SDUPTHER

## 2019-05-23 RX ORDER — AMLODIPINE BESYLATE 5 MG/1
5 TABLET ORAL DAILY
Qty: 90 TABLET | Refills: 3 | Status: SHIPPED | OUTPATIENT
Start: 2019-05-23 | End: 2020-04-09 | Stop reason: SDUPTHER

## 2019-05-23 RX ORDER — FENOFIBRATE 160 MG/1
160 TABLET ORAL DAILY
Qty: 90 TABLET | Refills: 3 | Status: SHIPPED | OUTPATIENT
Start: 2019-05-23 | End: 2020-04-09 | Stop reason: SDUPTHER

## 2019-05-23 RX ORDER — ATORVASTATIN CALCIUM 80 MG/1
80 TABLET, FILM COATED ORAL NIGHTLY
Qty: 90 TABLET | Refills: 3 | Status: SHIPPED | OUTPATIENT
Start: 2019-05-23 | End: 2020-04-09 | Stop reason: SDUPTHER

## 2019-05-23 ASSESSMENT — ENCOUNTER SYMPTOMS
SINUS PRESSURE: 0
EYE DISCHARGE: 0
WHEEZING: 0
SINUS PAIN: 0
VOICE CHANGE: 0
ABDOMINAL PAIN: 0
VOMITING: 0
COUGH: 0
TROUBLE SWALLOWING: 0
RHINORRHEA: 0
NAUSEA: 0
SORE THROAT: 0

## 2019-05-23 NOTE — PROGRESS NOTES
[facility]  [address] [place] [address]  Dept: [phone]  Dept Fax: [phone]  Loc: [phone]     Rajesh Soto is a 71 y.o. male who presents today for his medical conditions/complaintsas noted below. Chief Complaint   Patient presents with    Medication Refill     needs meds refilled       HPI:      States that he is retired now. Hyperlipidemia. Onset years ago. Is taking his medication more toutinely now. Would like a 90 day supply. Tolerating the high dose without side effects. Mood issue. Was on zoloft, but then stopped taking it. Denies SI or hi. Medications:    Current Outpatient Medications:     atorvastatin (LIPITOR) 80 MG tablet, Take 1 tablet by mouth nightly, Disp: 90 tablet, Rfl: 3    levothyroxine (SYNTHROID) 25 MCG tablet, TAKE 1 TABLET BY MOUTH DAILY WITH WATER ON AN EMPTY STOMACH, WAIT 30 MINUTES BEFORE EATING OR TAKING OTHER MEDS, Disp: 90 tablet, Rfl: 3    amLODIPine (NORVASC) 5 MG tablet, Take 1 tablet by mouth daily, Disp: 90 tablet, Rfl: 3    fenofibrate 160 MG tablet, Take 1 tablet by mouth daily, Disp: 90 tablet, Rfl: 3    meloxicam (MOBIC) 15 MG tablet, Take 1 tablet by mouth daily, Disp: 30 tablet, Rfl: 3    omeprazole (PRILOSEC) 20 MG delayed release capsule, Take 1 capsule by mouth daily, Disp: 90 capsule, Rfl: 1    fluticasone (FLONASE) 50 MCG/ACT nasal spray, 2 sprays by Nasal route daily Apply daily to each nare, Disp: 1 Bottle, Rfl: 0    RA VITAMIN B-12 TR 1000 MCG TBCR, take 1 tablet by mouth once daily, Disp: 30 tablet, Rfl: 5    aspirin 81 MG tablet, Take 81 mg by mouth daily, Disp: , Rfl:     omega-3 acid ethyl esters (LOVAZA) 1 G capsule, Take 2 capsules by mouth 2 times daily, Disp: 60 capsule, Rfl: 3    The patientis allergic to shellfish-derived products.     Past Medical History  Alia Presley  has a past medical history of Ankylosing spondylitis ([address] Migraine, and TIA (transient ischemic attack). Past Surgical History  The patient  has a past surgical history that includes knee surgery (1983); Myringotomy Tympanostomy Tube Placement (Left, 04/16/2013); and sinus surgery. Family History  This patient's family history includes Arthritis in his mother. Social History  Kevin Flores  reports that he has never smoked. He has never used smokeless tobacco. He reports that he drinks alcohol. He reports that he does not use drugs. Health Maintenance  Health Maintenance Due   Topic Date Due    Hepatitis C screen  1955    HIV screen  05/20/1969    DTaP/Tdap/Td vaccine (1 - Tdap) 05/20/1973    Shingles Vaccine (1 of 2) 05/20/2004    Colon Cancer Screen FIT/FOBT  11/07/2018    Pneumococcal 65+ years Vaccine (1 of 2 - PCV13) 05/20/2019       Subjective:     Review of Systems   Constitutional: Negative for activity change, appetite change, chills, diaphoresis, fatigue and fever. HENT: Negative for congestion, ear pain, postnasal drip, rhinorrhea, sinus pressure, sinus pain, sneezing, sore throat, tinnitus, trouble swallowing and voice change. Eyes: Negative for discharge and visual disturbance. Respiratory: Negative for cough and wheezing. Cardiovascular: Negative for chest pain and palpitations. Gastrointestinal: Negative for abdominal pain, nausea and vomiting. Genitourinary: Negative for decreased urine volume. Musculoskeletal: Negative for arthralgias, joint swelling, myalgias and neck pain. Skin: Negative for rash. Neurological: Negative for weakness, numbness and headaches. Psychiatric/Behavioral: Negative for agitation, behavioral problems, confusion, decreased concentration, self-injury, sleep disturbance and suicidal ideas. The patient is not nervous/anxious.          Symptoms improved but have not resolved       Objective:     BP (!) 150/72 (Site: Right Upper Arm)   Pulse 85   Resp 16   Wt 170 lb (77.1 kg)   SpO2 98%   BMI 24.39 kg/m²      Physical Exam Constitutional: He is oriented to person, place, and time. He appears well-developed and well-nourished. HENT:   Head: Normocephalic and atraumatic. Right Ear: External ear normal.   Left Ear: External ear normal.   Eyes: Conjunctivae and EOM are normal.   Neck: Trachea normal and normal range of motion. Neck supple. No spinous process tenderness present. No thyromegaly present. No erythema, rash. Cardiovascular: Normal rate, regular rhythm and normal heart sounds. Exam reveals no gallop and no friction rub. No murmur heard. Pulses:       Radial pulses are 2+ on the right side, and 2+ on the left side. No edema to bilateral ankles. Pulmonary/Chest: Effort normal and breath sounds normal.   Abdominal: Soft. Bowel sounds are normal. There is no tenderness. Musculoskeletal: Normal range of motion. Right foot: Normal.        Left foot: Normal.   Neurological: He is alert and oriented to person, place, and time. Skin: Skin is warm and dry. No rash noted. No erythema. Psychiatric: He has a normal mood and affect. His speech is normal and behavior is normal. Thought content normal.   Vitals reviewed. Lab Results   Component Value Date    TSH 3.410 02/01/2019     Lab Results   Component Value Date    CHOL 268 (H) 02/01/2019    CHOL 208 (H) 11/08/2017    CHOL 192 08/29/2016     Lab Results   Component Value Date    TRIG 295 (H) 02/01/2019    TRIG 245 (H) 11/08/2017    TRIG 171 08/29/2016     Lab Results   Component Value Date    HDL 33 02/01/2019    HDL 35 11/08/2017    HDL 38 08/29/2016     Lab Results   Component Value Date    LDLCALC 176 02/01/2019    LDLCALC 124 11/08/2017    LDLCALC 120 08/29/2016     No results found for: LABVLDL, VLDL  No results found for: CHOLHDLRATIO      Assessment/Plan:      Rashel Huffman was seen today for medication refill.     Diagnoses and all orders for this visit:    Hypercholesterolemia with hypertriglyceridemia   Stable   Advised repeating labs  -     atorvastatin (LIPITOR) 80 MG tablet; Take 1 tablet by mouth nightly  -     fenofibrate 160 MG tablet; Take 1 tablet by mouth daily    Hypothyroidism, unspecified type   Stable   Reviewed last labs  -     levothyroxine (SYNTHROID) 25 MCG tablet; TAKE 1 TABLET BY MOUTH DAILY WITH WATER ON AN EMPTY STOMACH, WAIT 30 MINUTES BEFORE EATING OR TAKING OTHER MEDS    Essential hypertension   stable  -     amLODIPine (NORVASC) 5 MG tablet; Take 1 tablet by mouth daily         Return if symptoms worsen or fail to improve. Discussed use, benefit, andside effects of prescribed medications. Barriers to medication compliance addressed. All patient questions answered. Pt voiced understanding.      Electronically signedby HEVER Goodson CNP on 5/23/2019 at 2:35 PM

## 2019-09-19 ENCOUNTER — OFFICE VISIT (OUTPATIENT)
Dept: FAMILY MEDICINE CLINIC | Age: 64
End: 2019-09-19
Payer: MEDICARE

## 2019-09-19 VITALS
RESPIRATION RATE: 16 BRPM | HEIGHT: 70 IN | HEART RATE: 67 BPM | TEMPERATURE: 97.9 F | WEIGHT: 167 LBS | BODY MASS INDEX: 23.91 KG/M2 | SYSTOLIC BLOOD PRESSURE: 138 MMHG | DIASTOLIC BLOOD PRESSURE: 80 MMHG | OXYGEN SATURATION: 98 %

## 2019-09-19 DIAGNOSIS — Z00.00 ROUTINE GENERAL MEDICAL EXAMINATION AT A HEALTH CARE FACILITY: ICD-10-CM

## 2019-09-19 DIAGNOSIS — E78.2 HYPERCHOLESTEROLEMIA WITH HYPERTRIGLYCERIDEMIA: Primary | ICD-10-CM

## 2019-09-19 DIAGNOSIS — Z23 NEED FOR INFLUENZA VACCINATION: ICD-10-CM

## 2019-09-19 DIAGNOSIS — R61 DIAPHORESIS: ICD-10-CM

## 2019-09-19 DIAGNOSIS — K21.9 GASTROESOPHAGEAL REFLUX DISEASE, ESOPHAGITIS PRESENCE NOT SPECIFIED: ICD-10-CM

## 2019-09-19 DIAGNOSIS — Z23 NEED FOR PROPHYLACTIC VACCINATION AGAINST STREPTOCOCCUS PNEUMONIAE (PNEUMOCOCCUS): ICD-10-CM

## 2019-09-19 DIAGNOSIS — I10 ESSENTIAL HYPERTENSION: ICD-10-CM

## 2019-09-19 DIAGNOSIS — H65.112 ACUTE MUCOID OTITIS MEDIA OF LEFT EAR: ICD-10-CM

## 2019-09-19 DIAGNOSIS — E03.9 HYPOTHYROIDISM, UNSPECIFIED TYPE: ICD-10-CM

## 2019-09-19 DIAGNOSIS — H91.93 BILATERAL HEARING LOSS, UNSPECIFIED HEARING LOSS TYPE: ICD-10-CM

## 2019-09-19 DIAGNOSIS — F32.1 CURRENT MODERATE EPISODE OF MAJOR DEPRESSIVE DISORDER WITHOUT PRIOR EPISODE (HCC): ICD-10-CM

## 2019-09-19 DIAGNOSIS — Z12.11 SCREENING FOR COLON CANCER: ICD-10-CM

## 2019-09-19 DIAGNOSIS — H04.129 DRY EYE: ICD-10-CM

## 2019-09-19 PROCEDURE — G8420 CALC BMI NORM PARAMETERS: HCPCS | Performed by: NURSE PRACTITIONER

## 2019-09-19 PROCEDURE — 90670 PCV13 VACCINE IM: CPT | Performed by: NURSE PRACTITIONER

## 2019-09-19 PROCEDURE — 1123F ACP DISCUSS/DSCN MKR DOCD: CPT | Performed by: NURSE PRACTITIONER

## 2019-09-19 PROCEDURE — 3017F COLORECTAL CA SCREEN DOC REV: CPT | Performed by: NURSE PRACTITIONER

## 2019-09-19 PROCEDURE — G0402 INITIAL PREVENTIVE EXAM: HCPCS | Performed by: NURSE PRACTITIONER

## 2019-09-19 PROCEDURE — G8427 DOCREV CUR MEDS BY ELIG CLIN: HCPCS | Performed by: NURSE PRACTITIONER

## 2019-09-19 PROCEDURE — G0009 ADMIN PNEUMOCOCCAL VACCINE: HCPCS | Performed by: NURSE PRACTITIONER

## 2019-09-19 PROCEDURE — 1036F TOBACCO NON-USER: CPT | Performed by: NURSE PRACTITIONER

## 2019-09-19 PROCEDURE — 99213 OFFICE O/P EST LOW 20 MIN: CPT | Performed by: NURSE PRACTITIONER

## 2019-09-19 PROCEDURE — 4040F PNEUMOC VAC/ADMIN/RCVD: CPT | Performed by: NURSE PRACTITIONER

## 2019-09-19 PROCEDURE — 90653 IIV ADJUVANT VACCINE IM: CPT | Performed by: NURSE PRACTITIONER

## 2019-09-19 PROCEDURE — G0008 ADMIN INFLUENZA VIRUS VAC: HCPCS | Performed by: NURSE PRACTITIONER

## 2019-09-19 RX ORDER — AMOXICILLIN 875 MG/1
875 TABLET, COATED ORAL 2 TIMES DAILY
Qty: 20 TABLET | Refills: 0 | Status: SHIPPED | OUTPATIENT
Start: 2019-09-19 | End: 2019-09-29

## 2019-09-19 RX ORDER — AMOXICILLIN 875 MG/1
875 TABLET, COATED ORAL 2 TIMES DAILY
Qty: 20 TABLET | Refills: 0 | Status: SHIPPED | OUTPATIENT
Start: 2019-09-19 | End: 2019-09-19 | Stop reason: SDUPTHER

## 2019-09-19 ASSESSMENT — LIFESTYLE VARIABLES
HOW OFTEN DURING THE LAST YEAR HAVE YOU BEEN UNABLE TO REMEMBER WHAT HAPPENED THE NIGHT BEFORE BECAUSE YOU HAD BEEN DRINKING: 0
AUDIT-C TOTAL SCORE: 1
HOW MANY STANDARD DRINKS CONTAINING ALCOHOL DO YOU HAVE ON A TYPICAL DAY: 0
HOW OFTEN DO YOU HAVE SIX OR MORE DRINKS ON ONE OCCASION: 0
HOW OFTEN DO YOU HAVE A DRINK CONTAINING ALCOHOL: 1
HOW OFTEN DURING THE LAST YEAR HAVE YOU HAD A FEELING OF GUILT OR REMORSE AFTER DRINKING: 0
AUDIT TOTAL SCORE: 1
HAVE YOU OR SOMEONE ELSE BEEN INJURED AS A RESULT OF YOUR DRINKING: 0
HAS A RELATIVE, FRIEND, DOCTOR, OR ANOTHER HEALTH PROFESSIONAL EXPRESSED CONCERN ABOUT YOUR DRINKING OR SUGGESTED YOU CUT DOWN: 0
HOW OFTEN DURING THE LAST YEAR HAVE YOU FAILED TO DO WHAT WAS NORMALLY EXPECTED FROM YOU BECAUSE OF DRINKING: 0
HOW OFTEN DURING THE LAST YEAR HAVE YOU NEEDED AN ALCOHOLIC DRINK FIRST THING IN THE MORNING TO GET YOURSELF GOING AFTER A NIGHT OF HEAVY DRINKING: 0
HOW OFTEN DURING THE LAST YEAR HAVE YOU FOUND THAT YOU WERE NOT ABLE TO STOP DRINKING ONCE YOU HAD STARTED: 0

## 2019-09-19 ASSESSMENT — PATIENT HEALTH QUESTIONNAIRE - PHQ9
SUM OF ALL RESPONSES TO PHQ QUESTIONS 1-9: 0
SUM OF ALL RESPONSES TO PHQ QUESTIONS 1-9: 0

## 2019-09-19 NOTE — PROGRESS NOTES
Disp: 90 tablet, Rfl: 3    amLODIPine (NORVASC) 5 MG tablet, Take 1 tablet by mouth daily, Disp: 90 tablet, Rfl: 3    omeprazole (PRILOSEC) 20 MG delayed release capsule, Take 1 capsule by mouth daily, Disp: 90 capsule, Rfl: 1    aspirin 81 MG tablet, Take 81 mg by mouth daily, Disp: , Rfl:     fenofibrate 160 MG tablet, Take 1 tablet by mouth daily, Disp: 90 tablet, Rfl: 3    RA VITAMIN B-12 TR 1000 MCG TBCR, take 1 tablet by mouth once daily, Disp: 30 tablet, Rfl: 5    omega-3 acid ethyl esters (LOVAZA) 1 G capsule, Take 2 capsules by mouth 2 times daily, Disp: 60 capsule, Rfl: 3    The patientis allergic to shellfish-derived products. Past Medical History  [place]  has a past medical history of Ankylosing spondylitis ([address] Migraine, and TIA (transient ischemic attack). Past Surgical History  The patient  has a past surgical history that includes knee surgery (1983); Myringotomy Tympanostomy Tube Placement (Left, 04/16/2013); and sinus surgery. Family History  This patient's family history includes Arthritis in his mother. Social History  [place]  reports that he has never smoked. He has never used smokeless tobacco. He reports that he drinks alcohol. He reports that he does not use drugs. Health Maintenance  Health Maintenance Due   Topic Date Due    Hepatitis C screen  1955    HIV screen  05/20/1969    DTaP/Tdap/Td vaccine (1 - Tdap) 05/20/1973    Shingles Vaccine (1 of 2) 05/20/2004    Colon Cancer Screen FIT/FOBT  11/07/2018    Annual Wellness Visit (AWV)  05/29/2019       Subjective:     Review of Systems   Constitutional: Positive for diaphoresis (sometimes at night). Negative for activity change, appetite change, chills, fatigue and fever. HENT: Negative for congestion, ear pain, postnasal drip, rhinorrhea, sinus pressure, sinus pain, sneezing, sore throat, tinnitus, trouble swallowing and voice change. Eyes: Negative for discharge and visual disturbance (wears glasses). Sammi Lloyd was seen today for 6 month follow-up and welcome to medicare visit. Diagnoses and all orders for this visit:    Hypercholesterolemia with hypertriglyceridemia  -     Lipid Panel; Future    Hypothyroidism, unspecified type  -     TSH with Reflex; Future    Essential hypertension  -     CBC Auto Differential; Future  -     Comprehensive Metabolic Panel, Fasting; Future  -     Lipid Panel; Future  -     TSH with Reflex; Future  -     Magnesium; Future    Current moderate episode of major depressive disorder without prior episode ([facility]    Screening for colon cancer  -     POCT Fecal Immunochemical Test (FIT); Future    Routine general medical examination at a health care facility    Need for prophylactic vaccination against Streptococcus pneumoniae (pneumococcus)  -     PREVNAR 13 IM (Pneumococcal conjugate vaccine 13-valent)    Need for influenza vaccination  -     INFLUENZA, TRIV, INACTIVATED, SUBUNIT, ADJUVANTED, 65 YRS AND OLDER, IM, PREFILL SYR, 0.5ML (FLUAD TRIV)    Bilateral hearing loss, unspecified hearing loss type    Dry eye    Acute mucoid otitis media of left ear  -     Discontinue: amoxicillin (AMOXIL) 875 MG tablet; Take 1 tablet by mouth 2 times daily for 10 days  -     amoxicillin (AMOXIL) 875 MG tablet; Take 1 tablet by mouth 2 times daily for 10 days    Diaphoresis    Gastroesophageal reflux disease, esophagitis presence not specified         Return in 6 months (on 3/19/2020) for  for chronic issues and then Medicare Annual Wellness Visit in 1 year. Discussed use, benefit, andside effects of prescribed medications. Barriers to medication compliance addressed. All patient questions answered. Pt voiced understanding. Electronically signedby HEVER Vegas CNP on 2019 at 9:36 AM      Medicare Annual Wellness Visit  Name: Ricky Garza Date: 2019   MRN: [id number] Sex: Male   Age: 71 y.o.  Ethnicity: Non-/Non    : 1955 Race: Yulissa Brown Oumar George is here for 6 Month Follow-Up (mood, arthritis, hyperlipidemia ) and Welcome To Medicare Visit (medicare welcome visit )    Screenings for behavioral, psychosocial and functional/safety risks, and cognitive dysfunction are all negative except as indicated below. These results, as well as other patient data from the [address] form, are documented in Flowsheets linked to this Encounter. Allergies   Allergen Reactions    Shellfish-Derived Products    g   Prior to Visit Medications    Medication Sig Taking?  Authorizing Provider   amoxicillin (AMOXIL) 875 MG tablet Take 1 tablet by mouth 2 times daily for 10 days Yes HEVER Vázquez CNP   atorvastatin (LIPITOR) 80 MG tablet Take 1 tablet by mouth nightly Yes HEVER Vázquez CNP   levothyroxine (SYNTHROID) 25 MCG tablet TAKE 1 TABLET BY MOUTH DAILY WITH WATER ON AN EMPTY STOMACH, WAIT 30 MINUTES BEFORE EATING OR TAKING OTHER MEDS Yes HEVER Vázquez CNP   amLODIPine (NORVASC) 5 MG tablet Take 1 tablet by mouth daily Yes HEVER Vázquez CNP   omeprazole (PRILOSEC) 20 MG delayed release capsule Take 1 capsule by mouth daily Yes HEVER Vázquez CNP   aspirin 81 MG tablet Take 81 mg by mouth daily Yes Historical Provider, MD   fenofibrate 160 MG tablet Take 1 tablet by mouth daily  HEVER Vázquez CNP   RA VITAMIN B-12 TR 1000 MCG TBCR take 1 tablet by mouth once daily  HEVER Vázquez CNP   omega-3 acid ethyl esters (LOVAZA) 1 G capsule Take 2 capsules by mouth 2 times daily  Neyda Hein MD   Provider   atorvastatin (LIPITOR) 80 MG tablet Take 1 tablet by mouth nightly Yes HEVER Vázquez CNP   levothyroxine (SYNTHROID) 25 MCG tablet TAKE 1 TABLET BY MOUTH DAILY WITH WATER ON AN EMPTY STOMACH, WAIT 30 MINUTES BEFORE EATING OR TAKING OTHER MEDS Yes HEVER Vázquez CNP   amLODIPine (NORVASC) 5 MG tablet Take 1 tablet by mouth daily Yes HEVER Vázquez CNP   omeprazole made and/or referrals ordered. Positive Risk Factor Screenings with Interventions:     General Health:  General  In general, how would you say your health is?: Good  In the past 7 days, have you experienced any of the following? New or Increased Pain, New or Increased Fatigue, Loneliness, Social Isolation, Stress or Anger?: None of These  Do you get the social and emotional support that you need?: (!) No  Do you have a Living Will?: (!) No  General Health Risk Interventions:  · Social isolation: does not want to talk about his feelings, has good family support  · No Living Will: is going to talk to his     Health Habits/Nutrition:  Health Habits/Nutrition  Do you exercise for at least 20 minutes 2-3 times per week?: (!) No  Have you lost any weight without trying in the past 3 months?: No  Do you eat fewer than 2 meals per day?: No  Have you seen a dentist within the past year?: (!) No  Body mass index is 23.96 kg/m².   Health Habits/Nutrition Interventions:  · Inadequate physical activity:  is going to start walking more  · Dental exam overdue:  patient encouraged to make appointment with his/her dentist    Hearing/Vision:  No exam data present  Hearing/Vision  Do you or your family notice any trouble with your hearing?: (!) Yes  Do you have difficulty driving, watching TV, or doing any of your daily activities because of your eyesight?: No  Have you had an eye exam within the past year?: Yes  Hearing/Vision Interventions:  · Hearing concerns:  has hearing aides, just spent 5000    Safety:  Safety  Do you have working smoke detectors?: Yes  Have all throw rugs been removed or fastened?: Yes  Do you have non-slip mats or surfaces in all bathtubs/showers?: (!) No  Do all of your stairways have a railing or banister?: Yes  Are your doorways, halls and stairs free of clutter?: Yes  Do you always fasten your seatbelt when you are in a car?: Yes  Safety Interventions:  · Home safety tips ADJUVANTED, 65 YRS AND OLDER, IM, PREFILL SYR, 0.5ML (FLUAD TRIV)    Bilateral hearing loss, unspecified hearing loss type    Dry eye    Acute mucoid otitis media of left ear  -     Discontinue: amoxicillin (AMOXIL) 875 MG tablet; Take 1 tablet by mouth 2 times daily for 10 days  -     amoxicillin (AMOXIL) 875 MG tablet;  Take 1 tablet by mouth 2 times daily for 10 days    Diaphoresis    Gastroesophageal reflux disease, esophagitis presence not specified

## 2019-09-20 ENCOUNTER — NURSE ONLY (OUTPATIENT)
Dept: FAMILY MEDICINE CLINIC | Age: 64
End: 2019-09-20
Payer: MEDICARE

## 2019-09-20 DIAGNOSIS — E78.2 HYPERCHOLESTEROLEMIA WITH HYPERTRIGLYCERIDEMIA: ICD-10-CM

## 2019-09-20 DIAGNOSIS — E03.9 HYPOTHYROIDISM, UNSPECIFIED TYPE: ICD-10-CM

## 2019-09-20 DIAGNOSIS — I10 ESSENTIAL HYPERTENSION: ICD-10-CM

## 2019-09-20 LAB
ALBUMIN SERPL-MCNC: 4.4 G/DL (ref 3.5–5.1)
ALP BLD-CCNC: 65 U/L (ref 38–126)
ALT SERPL-CCNC: 13 U/L (ref 11–66)
ANION GAP SERPL CALCULATED.3IONS-SCNC: 13 MEQ/L (ref 8–16)
AST SERPL-CCNC: 22 U/L (ref 5–40)
BASOPHILS # BLD: 0.4 %
BASOPHILS ABSOLUTE: 0 THOU/MM3 (ref 0–0.1)
BILIRUB SERPL-MCNC: 1 MG/DL (ref 0.3–1.2)
BUN BLDV-MCNC: 16 MG/DL (ref 7–22)
CALCIUM SERPL-MCNC: 9.6 MG/DL (ref 8.5–10.5)
CHLORIDE BLD-SCNC: 105 MEQ/L (ref 98–111)
CHOLESTEROL, TOTAL: 222 MG/DL (ref 100–199)
CO2: 22 MEQ/L (ref 23–33)
CREAT SERPL-MCNC: 1.2 MG/DL (ref 0.4–1.2)
EOSINOPHIL # BLD: 3 %
EOSINOPHILS ABSOLUTE: 0.3 THOU/MM3 (ref 0–0.4)
ERYTHROCYTE [DISTWIDTH] IN BLOOD BY AUTOMATED COUNT: 13.1 % (ref 11.5–14.5)
ERYTHROCYTE [DISTWIDTH] IN BLOOD BY AUTOMATED COUNT: 40.6 FL (ref 35–45)
GFR SERPL CREATININE-BSD FRML MDRD: 61 ML/MIN/1.73M2
GLUCOSE FASTING: 88 MG/DL (ref 70–108)
HCT VFR BLD CALC: 45.8 % (ref 42–52)
HDLC SERPL-MCNC: 36 MG/DL
HEMOGLOBIN: 14.9 GM/DL (ref 14–18)
IMMATURE GRANS (ABS): 0.02 THOU/MM3 (ref 0–0.07)
IMMATURE GRANULOCYTES: 0 %
LDL CHOLESTEROL CALCULATED: 137 MG/DL
LYMPHOCYTES # BLD: 19.8 %
LYMPHOCYTES ABSOLUTE: 1.8 THOU/MM3 (ref 1–4.8)
MAGNESIUM: 2.2 MG/DL (ref 1.6–2.4)
MCH RBC QN AUTO: 28 PG (ref 26–33)
MCHC RBC AUTO-ENTMCNC: 32.5 GM/DL (ref 32.2–35.5)
MCV RBC AUTO: 86.1 FL (ref 80–94)
MONOCYTES # BLD: 12.1 %
MONOCYTES ABSOLUTE: 1.1 THOU/MM3 (ref 0.4–1.3)
NUCLEATED RED BLOOD CELLS: 0 /100 WBC
PLATELET # BLD: 267 THOU/MM3 (ref 130–400)
PMV BLD AUTO: 11 FL (ref 9.4–12.4)
POTASSIUM SERPL-SCNC: 4.3 MEQ/L (ref 3.5–5.2)
RBC # BLD: 5.32 MILL/MM3 (ref 4.7–6.1)
SEG NEUTROPHILS: 64.5 %
SEGMENTED NEUTROPHILS ABSOLUTE COUNT: 5.9 THOU/MM3 (ref 1.8–7.7)
SODIUM BLD-SCNC: 140 MEQ/L (ref 135–145)
TOTAL PROTEIN: 7.6 G/DL (ref 6.1–8)
TRIGL SERPL-MCNC: 243 MG/DL (ref 0–199)
TSH SERPL DL<=0.05 MIU/L-ACNC: 3.83 UIU/ML (ref 0.4–4.2)
WBC # BLD: 9.2 THOU/MM3 (ref 4.8–10.8)

## 2019-09-20 PROCEDURE — 36415 COLL VENOUS BLD VENIPUNCTURE: CPT | Performed by: NURSE PRACTITIONER

## 2019-09-20 ASSESSMENT — ENCOUNTER SYMPTOMS
SINUS PRESSURE: 0
WHEEZING: 0
VOMITING: 0
TROUBLE SWALLOWING: 0
RHINORRHEA: 0
COUGH: 0
EYE DISCHARGE: 0
SINUS PAIN: 0
NAUSEA: 0
ABDOMINAL PAIN: 0
SORE THROAT: 0
VOICE CHANGE: 0

## 2019-09-23 DIAGNOSIS — I10 ESSENTIAL HYPERTENSION: Primary | ICD-10-CM

## 2019-09-23 DIAGNOSIS — E78.2 HYPERCHOLESTEROLEMIA WITH HYPERTRIGLYCERIDEMIA: ICD-10-CM

## 2019-11-21 ENCOUNTER — OFFICE VISIT (OUTPATIENT)
Dept: ENT CLINIC | Age: 64
End: 2019-11-21
Payer: MEDICARE

## 2019-11-21 VITALS — HEART RATE: 68 BPM | SYSTOLIC BLOOD PRESSURE: 136 MMHG | RESPIRATION RATE: 12 BRPM | DIASTOLIC BLOOD PRESSURE: 82 MMHG

## 2019-11-21 DIAGNOSIS — H90.3 SENSORINEURAL HEARING LOSS (SNHL) OF BOTH EARS: ICD-10-CM

## 2019-11-21 DIAGNOSIS — H69.82 ETD (EUSTACHIAN TUBE DYSFUNCTION), LEFT: ICD-10-CM

## 2019-11-21 DIAGNOSIS — H65.92 FLUID LEVEL BEHIND TYMPANIC MEMBRANE OF LEFT EAR: Primary | ICD-10-CM

## 2019-11-21 DIAGNOSIS — H93.13 TINNITUS OF BOTH EARS: ICD-10-CM

## 2019-11-21 PROCEDURE — G8427 DOCREV CUR MEDS BY ELIG CLIN: HCPCS | Performed by: PHYSICIAN ASSISTANT

## 2019-11-21 PROCEDURE — 1123F ACP DISCUSS/DSCN MKR DOCD: CPT | Performed by: PHYSICIAN ASSISTANT

## 2019-11-21 PROCEDURE — G8420 CALC BMI NORM PARAMETERS: HCPCS | Performed by: PHYSICIAN ASSISTANT

## 2019-11-21 PROCEDURE — 3017F COLORECTAL CA SCREEN DOC REV: CPT | Performed by: PHYSICIAN ASSISTANT

## 2019-11-21 PROCEDURE — G8482 FLU IMMUNIZE ORDER/ADMIN: HCPCS | Performed by: PHYSICIAN ASSISTANT

## 2019-11-21 PROCEDURE — 1036F TOBACCO NON-USER: CPT | Performed by: PHYSICIAN ASSISTANT

## 2019-11-21 PROCEDURE — 4040F PNEUMOC VAC/ADMIN/RCVD: CPT | Performed by: PHYSICIAN ASSISTANT

## 2019-11-21 PROCEDURE — 99203 OFFICE O/P NEW LOW 30 MIN: CPT | Performed by: PHYSICIAN ASSISTANT

## 2019-11-21 ASSESSMENT — ENCOUNTER SYMPTOMS
SORE THROAT: 0
VOICE CHANGE: 0
ABDOMINAL PAIN: 0
VOMITING: 0
FACIAL SWELLING: 0
COLOR CHANGE: 0
APNEA: 0
STRIDOR: 0
SHORTNESS OF BREATH: 0
CHOKING: 0
RHINORRHEA: 0
CHEST TIGHTNESS: 0
COUGH: 0
DIARRHEA: 0
WHEEZING: 0
NAUSEA: 0
SINUS PRESSURE: 0
TROUBLE SWALLOWING: 0

## 2020-01-07 ENCOUNTER — OFFICE VISIT (OUTPATIENT)
Dept: ENT CLINIC | Age: 65
End: 2020-01-07
Payer: MEDICARE

## 2020-01-07 VITALS
WEIGHT: 171 LBS | BODY MASS INDEX: 24.48 KG/M2 | HEIGHT: 70 IN | HEART RATE: 84 BPM | SYSTOLIC BLOOD PRESSURE: 136 MMHG | DIASTOLIC BLOOD PRESSURE: 64 MMHG | RESPIRATION RATE: 18 BRPM

## 2020-01-07 PROCEDURE — 69433 CREATE EARDRUM OPENING: CPT | Performed by: OTOLARYNGOLOGY

## 2020-01-07 PROCEDURE — 92511 NASOPHARYNGOSCOPY: CPT | Performed by: OTOLARYNGOLOGY

## 2020-01-07 PROCEDURE — 3017F COLORECTAL CA SCREEN DOC REV: CPT | Performed by: OTOLARYNGOLOGY

## 2020-01-07 PROCEDURE — G8482 FLU IMMUNIZE ORDER/ADMIN: HCPCS | Performed by: OTOLARYNGOLOGY

## 2020-01-07 PROCEDURE — 1036F TOBACCO NON-USER: CPT | Performed by: OTOLARYNGOLOGY

## 2020-01-07 PROCEDURE — G8427 DOCREV CUR MEDS BY ELIG CLIN: HCPCS | Performed by: OTOLARYNGOLOGY

## 2020-01-07 PROCEDURE — 4040F PNEUMOC VAC/ADMIN/RCVD: CPT | Performed by: OTOLARYNGOLOGY

## 2020-01-07 PROCEDURE — 1123F ACP DISCUSS/DSCN MKR DOCD: CPT | Performed by: OTOLARYNGOLOGY

## 2020-01-07 PROCEDURE — G8420 CALC BMI NORM PARAMETERS: HCPCS | Performed by: OTOLARYNGOLOGY

## 2020-01-07 ASSESSMENT — ENCOUNTER SYMPTOMS
CHOKING: 0
DIARRHEA: 0
NAUSEA: 0
RHINORRHEA: 0
STRIDOR: 0
COUGH: 0
CHEST TIGHTNESS: 0
VOMITING: 0
WHEEZING: 0
SINUS PRESSURE: 0
SORE THROAT: 0
COLOR CHANGE: 0
ABDOMINAL PAIN: 0
APNEA: 0
TROUBLE SWALLOWING: 0
FACIAL SWELLING: 0
SHORTNESS OF BREATH: 0
VOICE CHANGE: 0

## 2020-01-07 NOTE — PROGRESS NOTES
MYRINGOTOMY AND TYMPANOSTOMY TUBE PLACEMENT Left 04/16/2013    Dr Brenda Aparicio History   Problem Relation Age of Onset    Arthritis Mother      Social History     Tobacco Use    Smoking status: Never Smoker    Smokeless tobacco: Never Used   Substance Use Topics    Alcohol use: Yes     Comment: social       Subjective:      Review of Systems   Constitutional: Negative for activity change, appetite change, chills, diaphoresis, fatigue, fever and unexpected weight change. HENT: Positive for ear discharge. Negative for congestion, dental problem, ear pain, facial swelling, hearing loss, mouth sores, nosebleeds, postnasal drip, rhinorrhea, sinus pressure, sneezing, sore throat, tinnitus, trouble swallowing and voice change. Eyes: Negative for visual disturbance. Respiratory: Negative for apnea, cough, choking, chest tightness, shortness of breath, wheezing and stridor. Cardiovascular: Negative for chest pain, palpitations and leg swelling. Gastrointestinal: Negative for abdominal pain, diarrhea, nausea and vomiting. Endocrine: Negative for cold intolerance, heat intolerance, polydipsia and polyuria. Genitourinary: Negative for dysuria, enuresis and hematuria. Musculoskeletal: Negative for arthralgias, gait problem, neck pain and neck stiffness. Skin: Negative for color change and rash. Allergic/Immunologic: Negative for environmental allergies, food allergies and immunocompromised state. Neurological: Negative for dizziness, syncope, facial asymmetry, speech difficulty, light-headedness and headaches. Hematological: Negative for adenopathy. Does not bruise/bleed easily. Psychiatric/Behavioral: Negative for confusion and sleep disturbance. The patient is not nervous/anxious.         Objective:   /64 (Site: Right Upper Arm, Position: Sitting)   Pulse 84   Resp 18   Ht 5' 10\" (1.778 m)   Wt 171 lb (77.6 kg)   BMI 24.54 kg/m²     Physical Exam   Ears: Right TM is clear. Left TM is retracted and the middle ear effusion has a slightly brownish-yellow hue. Nose anteriorly nasal fossa is patent. Fiberoptic nasopharyngoscopy. After adequate level of decongestion and topical anesthesia of the left nasal fossa was achieved with sprays, I attempted to pass a standard 4 mm fiberoptic scope but his left nasal airway was too tight. A 2.7 mm 30 degree pediatric rigid sinus scope was introduced into the nasopharynx and the function of the left Eustachian tube observed while he swallowed. At rest it was closed and on swallowing it did open up appropriately. There was no drainage from the tube. Myringotomy and tympanostomy tube placement    After an adequate level of topical anesthesia of the posterior inferior quadrant of the left tympanic membrane had been achieved with phenol, alcohol was instilled and suctioned dry. A radial incision was made in the posterior inferior quadrant. Extremely viscous, glue-like middle ear fluid was suctioned. Agosto ventilation tube was placed without difficulty. The patient's hearing improved immediately, but I could tell he could not hear very well even with the tube in place. The patient tolerated the procedure well. Data:  All of the past medical history, past surgical history, family history,social history, allergies and current medications were reviewed with the patient. Assessment & Plan   Diagnoses and all orders for this visit:     Diagnosis Orders   1. Chronic mucoid otitis media of left ear  NC NASOPHARYNGOSCOPY    NC CREATE EARDRUM OPENING,LOCAL ANESTH    Audiometry with tympanometry   2.  Retraction of tympanic membrane of left ear  NC NASOPHARYNGOSCOPY    NC CREATE EARDRUM OPENING,LOCAL ANESTH   3. ETD (Eustachian tube dysfunction), left  NC NASOPHARYNGOSCOPY    NC CREATE EARDRUM OPENING,LOCAL ANESTH   4. Sensorineural hearing loss (SNHL) of both ears  NC CREATE EARDRUM OPENING,LOCAL ANESTH    Audiometry with tympanometry   5. Tinnitus of both ears  OH CREATE EARDRUM OPENING,LOCAL ANESTH    Audiometry with tympanometry       The findings were explained and his questions were answered. Water precautions were reiterated. While I did not see any significant abnormalities of the nasopharynx, we will obtain an audiogram in 6 weeks before his return visit. Just in case he has a patulous left Eustachian tube, I asked the audiologist to set him up on the tympanometer and observe for variations in the baseline while breathing in and out through his nose      Return in about 6 weeks (around 2/18/2020) for Audiol review, Tube Check. Ned Whitfield. Arron Mario MD    **This report has been created using voice recognition software. It may contain minor errors which are inherent in voicerecognition technology. **

## 2020-02-18 ENCOUNTER — OFFICE VISIT (OUTPATIENT)
Dept: ENT CLINIC | Age: 65
End: 2020-02-18
Payer: MEDICARE

## 2020-02-18 ENCOUNTER — HOSPITAL ENCOUNTER (OUTPATIENT)
Dept: AUDIOLOGY | Age: 65
Discharge: HOME | End: 2020-02-18
Payer: MEDICARE

## 2020-02-18 VITALS
WEIGHT: 172.7 LBS | BODY MASS INDEX: 24.73 KG/M2 | DIASTOLIC BLOOD PRESSURE: 72 MMHG | SYSTOLIC BLOOD PRESSURE: 128 MMHG | RESPIRATION RATE: 14 BRPM | HEIGHT: 70 IN | HEART RATE: 70 BPM

## 2020-02-18 PROCEDURE — 99213 OFFICE O/P EST LOW 20 MIN: CPT | Performed by: OTOLARYNGOLOGY

## 2020-02-18 PROCEDURE — G8482 FLU IMMUNIZE ORDER/ADMIN: HCPCS | Performed by: OTOLARYNGOLOGY

## 2020-02-18 PROCEDURE — 4040F PNEUMOC VAC/ADMIN/RCVD: CPT | Performed by: OTOLARYNGOLOGY

## 2020-02-18 PROCEDURE — 1123F ACP DISCUSS/DSCN MKR DOCD: CPT | Performed by: OTOLARYNGOLOGY

## 2020-02-18 PROCEDURE — 1036F TOBACCO NON-USER: CPT | Performed by: OTOLARYNGOLOGY

## 2020-02-18 PROCEDURE — G8427 DOCREV CUR MEDS BY ELIG CLIN: HCPCS | Performed by: OTOLARYNGOLOGY

## 2020-02-18 PROCEDURE — 3017F COLORECTAL CA SCREEN DOC REV: CPT | Performed by: OTOLARYNGOLOGY

## 2020-02-18 PROCEDURE — 92567 TYMPANOMETRY: CPT | Performed by: AUDIOLOGIST

## 2020-02-18 PROCEDURE — 92557 COMPREHENSIVE HEARING TEST: CPT | Performed by: AUDIOLOGIST

## 2020-02-18 PROCEDURE — G8420 CALC BMI NORM PARAMETERS: HCPCS | Performed by: OTOLARYNGOLOGY

## 2020-02-18 ASSESSMENT — ENCOUNTER SYMPTOMS
VOMITING: 0
TROUBLE SWALLOWING: 0
STRIDOR: 0
SHORTNESS OF BREATH: 0
RHINORRHEA: 0
FACIAL SWELLING: 0
APNEA: 0
NAUSEA: 0
VOICE CHANGE: 0
CHOKING: 0
COLOR CHANGE: 0
CHEST TIGHTNESS: 0
COUGH: 0
SORE THROAT: 0
DIARRHEA: 0
SINUS PRESSURE: 0
WHEEZING: 0
ABDOMINAL PAIN: 0

## 2020-02-18 NOTE — PROGRESS NOTES
AUDIOLOGICAL EVALUATION      REASON FOR TESTING:  Recheck following PE tube placement in the left ear on 01/07/2020. The patient reports improved hearing but the sound is loud and often distorted particularly when he is listening to louder sounds. He also hears a rumbling sound in his left ear when listening to loud sounds or in very quiet environments. He does wear bilateral hearing aids and has a history of occupational noise exposure. OTOSCOPY: Clear canal- AD  Clear canal with PE tube visualized in TM- AS     AUDIOGRAM        Reliability: Good  Audiometer Used:  [id number]        COMMENTS: Audiometry revealed a moderate low frequency conductive hearing loss rising to a slight to mild hearing loss at 1000 Hz and sloping to a mild to profound sensorineural hearing loss in both ears. Word recognition ability is poor in the left ear at 68% and good in the right ear at 80%. Tympanometry revealed normal middle ear compliance with -16daPa middle ear pressure and a flat tympanogram with a large (4.5) ear canal volume in the left ear. ETD testing for the left ear did not suggest a patulous eustachian tube or eustachian tube dysfunction for the left ear. Testing not ordered for the right ear. RECOMMENDATION(S): 1- Continue care with Dr. Alcon Camara today, as scheduled. 2- Repeat audiogram and tympanogram following any medical management or if any changes are noted in hearing ability. 3- The patient should follow up with his hearing healthcare provider for adjustments to his hearing aids.

## 2020-02-18 NOTE — PROGRESS NOTES
[facility]  [facility]  Dept: [phone]  Dept Fax: [phone]  Loc: [phone]    Delsa Osgood is a 71 y.o. male who was referred byNo ref. provider found for:  Chief Complaint   Patient presents with    Follow-up     Patient is here for left tube placement follow up   . HPI:     Delsa Osgood is a 71 y.o. male who presents today for review of his audiogram and follow-up on tube placement in his left ear 6 weeks ago. His hearing is symmetrical and hearing testing except that he has significantly worse word discrimination on the left than the right right is at 80% in the left at 68. Tatyana Silk History: Allergies   Allergen Reactions    Shellfish-Derived Products      Current Outpatient Medications   Medication Sig Dispense Refill    atorvastatin (LIPITOR) 80 MG tablet Take 1 tablet by mouth nightly 90 tablet 3    levothyroxine (SYNTHROID) 25 MCG tablet TAKE 1 TABLET BY MOUTH DAILY WITH WATER ON AN EMPTY STOMACH, WAIT 30 MINUTES BEFORE EATING OR TAKING OTHER MEDS 90 tablet 3    amLODIPine (NORVASC) 5 MG tablet Take 1 tablet by mouth daily 90 tablet 3    fenofibrate 160 MG tablet Take 1 tablet by mouth daily 90 tablet 3    aspirin 81 MG tablet Take 81 mg by mouth daily      omega-3 acid ethyl esters (LOVAZA) 1 G capsule Take 2 capsules by mouth 2 times daily 60 capsule 3     No current facility-administered medications for this visit.       Past Medical History:   Diagnosis Date    Ankylosing spondylitis ([address]     Migraine     TIA (transient ischemic attack)       Past Surgical History:   Procedure Laterality Date    KNEE SURGERY  1983    both knees     MYRINGOTOMY AND TYMPANOSTOMY TUBE PLACEMENT Left 04/16/2013    Dr Ahsley Caputo SINUS SURGERY       Family History   Problem Relation Age of Onset    Arthritis Mother      Social History     Tobacco Use    Smoking status: Never Smoker    Smokeless tobacco: Never Used   Substance Use Topics    Alcohol use: Yes     Comment: social       Subjective:      Review of Systems   Constitutional: Negative for activity change, appetite change, chills, diaphoresis, fatigue, fever and unexpected weight change. HENT: Negative for congestion, dental problem, ear discharge, ear pain, facial swelling, hearing loss, mouth sores, nosebleeds, postnasal drip, rhinorrhea, sinus pressure, sneezing, sore throat, tinnitus, trouble swallowing and voice change. Eyes: Negative for visual disturbance. Respiratory: Negative for apnea, cough, choking, chest tightness, shortness of breath, wheezing and stridor. Cardiovascular: Negative for chest pain, palpitations and leg swelling. Gastrointestinal: Negative for abdominal pain, diarrhea, nausea and vomiting. Endocrine: Negative for cold intolerance, heat intolerance, polydipsia and polyuria. Genitourinary: Negative for dysuria, enuresis and hematuria. Musculoskeletal: Negative for arthralgias, gait problem, neck pain and neck stiffness. Skin: Negative for color change and rash. Allergic/Immunologic: Negative for environmental allergies, food allergies and immunocompromised state. Neurological: Negative for dizziness, syncope, facial asymmetry, speech difficulty, light-headedness and headaches. Hematological: Negative for adenopathy. Does not bruise/bleed easily. Psychiatric/Behavioral: Negative for confusion and sleep disturbance. The patient is not nervous/anxious. Objective:   /72 (Site: Right Upper Arm, Position: Sitting)   Pulse 70   Resp 14   Ht 5' 10\" (1.778 m)   Wt 172 lb 11.2 oz (78.3 kg)   BMI 24.78 kg/m²     Physical Exam   Microscope exam of the left ear shows tube in place and functioning. Tympanic membrane is a little dark.     Data:  All of the past medical history, past surgical history, family history,social history, allergies and current medications were reviewed with the patient. Assessment & Plan   Diagnoses and all orders for this visit:     Diagnosis Orders   1. Chronic mucoid otitis media of left ear     2. S/P tympanostomy tube placement     3. Sensorineural hearing loss (SNHL) of both ears     4. 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 We reviewed the findings. Patient seems satisfied and we will recheck the tube in 6 months      Return in about 6 months (around 8/18/2020) for Tube Check. Edson Cabrera. Abiodun Wood MD    **This report has been created using voice recognition software. It may contain minor errors which are inherent in voicerecognition technology. **

## 2020-04-09 ENCOUNTER — VIRTUAL VISIT (OUTPATIENT)
Dept: FAMILY MEDICINE CLINIC | Age: 65
End: 2020-04-09
Payer: MEDICARE

## 2020-04-09 PROCEDURE — 99214 OFFICE O/P EST MOD 30 MIN: CPT | Performed by: NURSE PRACTITIONER

## 2020-04-09 PROCEDURE — 1123F ACP DISCUSS/DSCN MKR DOCD: CPT | Performed by: NURSE PRACTITIONER

## 2020-04-09 PROCEDURE — G8427 DOCREV CUR MEDS BY ELIG CLIN: HCPCS | Performed by: NURSE PRACTITIONER

## 2020-04-09 PROCEDURE — 4040F PNEUMOC VAC/ADMIN/RCVD: CPT | Performed by: NURSE PRACTITIONER

## 2020-04-09 PROCEDURE — 3017F COLORECTAL CA SCREEN DOC REV: CPT | Performed by: NURSE PRACTITIONER

## 2020-04-09 RX ORDER — LEVOTHYROXINE SODIUM 0.03 MG/1
TABLET ORAL
Qty: 90 TABLET | Refills: 3 | Status: SHIPPED | OUTPATIENT
Start: 2020-04-09 | End: 2021-05-10

## 2020-04-09 RX ORDER — AMLODIPINE BESYLATE 5 MG/1
5 TABLET ORAL DAILY
Qty: 90 TABLET | Refills: 3 | Status: SHIPPED | OUTPATIENT
Start: 2020-04-09 | End: 2020-05-18 | Stop reason: SDUPTHER

## 2020-04-09 RX ORDER — FENOFIBRATE 160 MG/1
160 TABLET ORAL DAILY
Qty: 90 TABLET | Refills: 3 | Status: SHIPPED | OUTPATIENT
Start: 2020-04-09 | End: 2021-07-21 | Stop reason: SDUPTHER

## 2020-04-09 RX ORDER — ATORVASTATIN CALCIUM 80 MG/1
80 TABLET, FILM COATED ORAL NIGHTLY
Qty: 90 TABLET | Refills: 3 | Status: SHIPPED | OUTPATIENT
Start: 2020-04-09 | End: 2021-05-26 | Stop reason: SDUPTHER

## 2020-04-09 RX ORDER — OMEGA-3-ACID ETHYL ESTERS 1 G/1
2 CAPSULE, LIQUID FILLED ORAL 2 TIMES DAILY
Qty: 60 CAPSULE | Refills: 3 | Status: SHIPPED | OUTPATIENT
Start: 2020-04-09 | End: 2021-05-26 | Stop reason: SDUPTHER

## 2020-04-09 ASSESSMENT — ENCOUNTER SYMPTOMS
WHEEZING: 0
EYE DISCHARGE: 0
VOICE CHANGE: 0
RHINORRHEA: 0
TROUBLE SWALLOWING: 0
SINUS PAIN: 0
SINUS PRESSURE: 0
ABDOMINAL PAIN: 0
SORE THROAT: 0
VOMITING: 0
NAUSEA: 0
COUGH: 0

## 2020-04-13 ENCOUNTER — TELEPHONE (OUTPATIENT)
Dept: FAMILY MEDICINE CLINIC | Age: 65
End: 2020-04-13

## 2020-05-15 ENCOUNTER — HOSPITAL ENCOUNTER (EMERGENCY)
Age: 65
Discharge: HOME | End: 2020-05-15
Attending: FAMILY MEDICINE
Payer: MEDICARE

## 2020-05-15 ENCOUNTER — APPOINTMENT (OUTPATIENT)
Dept: GENERAL RADIOLOGY | Age: 65
End: 2020-05-15
Payer: MEDICARE

## 2020-05-15 ENCOUNTER — TELEPHONE (OUTPATIENT)
Dept: FAMILY MEDICINE CLINIC | Age: 65
End: 2020-05-15

## 2020-05-15 ENCOUNTER — NURSE TRIAGE (OUTPATIENT)
Dept: OTHER | Facility: CLINIC | Age: 65
End: 2020-05-15

## 2020-05-15 VITALS
SYSTOLIC BLOOD PRESSURE: 119 MMHG | OXYGEN SATURATION: 95 % | DIASTOLIC BLOOD PRESSURE: 65 MMHG | WEIGHT: 172 LBS | RESPIRATION RATE: 17 BRPM | HEART RATE: 57 BPM | BODY MASS INDEX: 24.62 KG/M2 | TEMPERATURE: 98 F | HEIGHT: 70 IN

## 2020-05-15 LAB
ANION GAP SERPL CALCULATED.3IONS-SCNC: 7 MEQ/L (ref 8–16)
BASOPHILS # BLD: 0.6 %
BASOPHILS ABSOLUTE: 0 THOU/MM3 (ref 0–0.1)
BUN BLDV-MCNC: 17 MG/DL (ref 7–22)
CALCIUM SERPL-MCNC: 9.1 MG/DL (ref 8.5–10.5)
CHLORIDE BLD-SCNC: 105 MEQ/L (ref 98–111)
CO2: 23 MEQ/L (ref 23–33)
CREAT SERPL-MCNC: 1.1 MG/DL (ref 0.4–1.2)
EKG ATRIAL RATE: 55 BPM
EKG P AXIS: 67 DEGREES
EKG P-R INTERVAL: 240 MS
EKG Q-T INTERVAL: 408 MS
EKG QRS DURATION: 102 MS
EKG QTC CALCULATION (BAZETT): 390 MS
EKG R AXIS: 3 DEGREES
EKG T AXIS: 42 DEGREES
EKG VENTRICULAR RATE: 55 BPM
EOSINOPHIL # BLD: 4.7 %
EOSINOPHILS ABSOLUTE: 0.3 THOU/MM3 (ref 0–0.4)
ERYTHROCYTE [DISTWIDTH] IN BLOOD BY AUTOMATED COUNT: 12.8 % (ref 11.5–14.5)
ERYTHROCYTE [DISTWIDTH] IN BLOOD BY AUTOMATED COUNT: 39.7 FL (ref 35–45)
GFR SERPL CREATININE-BSD FRML MDRD: 67 ML/MIN/1.73M2
GLUCOSE BLD-MCNC: 94 MG/DL (ref 70–108)
HCT VFR BLD CALC: 46.9 % (ref 42–52)
HEMOGLOBIN: 15.5 GM/DL (ref 14–18)
IMMATURE GRANS (ABS): 0.01 THOU/MM3 (ref 0–0.07)
IMMATURE GRANULOCYTES: 0.2 %
LYMPHOCYTES # BLD: 29.5 %
LYMPHOCYTES ABSOLUTE: 1.9 THOU/MM3 (ref 1–4.8)
MCH RBC QN AUTO: 28.6 PG (ref 26–33)
MCHC RBC AUTO-ENTMCNC: 33 GM/DL (ref 32.2–35.5)
MCV RBC AUTO: 86.5 FL (ref 80–94)
MONOCYTES # BLD: 12.5 %
MONOCYTES ABSOLUTE: 0.8 THOU/MM3 (ref 0.4–1.3)
NUCLEATED RED BLOOD CELLS: 0 /100 WBC
OSMOLALITY CALCULATION: 271.4 MOSMOL/KG (ref 275–300)
PLATELET # BLD: 249 THOU/MM3 (ref 130–400)
PMV BLD AUTO: 10.7 FL (ref 9.4–12.4)
POTASSIUM SERPL-SCNC: 4.1 MEQ/L (ref 3.5–5.2)
PRO-BNP: 48.1 PG/ML (ref 0–900)
RBC # BLD: 5.42 MILL/MM3 (ref 4.7–6.1)
SEG NEUTROPHILS: 52.5 %
SEGMENTED NEUTROPHILS ABSOLUTE COUNT: 3.4 THOU/MM3 (ref 1.8–7.7)
SODIUM BLD-SCNC: 135 MEQ/L (ref 135–145)
TROPONIN T: < 0.01 NG/ML
WBC # BLD: 6.4 THOU/MM3 (ref 4.8–10.8)

## 2020-05-15 PROCEDURE — 36415 COLL VENOUS BLD VENIPUNCTURE: CPT

## 2020-05-15 PROCEDURE — 93010 ELECTROCARDIOGRAM REPORT: CPT | Performed by: INTERNAL MEDICINE

## 2020-05-15 PROCEDURE — 80048 BASIC METABOLIC PNL TOTAL CA: CPT

## 2020-05-15 PROCEDURE — 85025 COMPLETE CBC W/AUTO DIFF WBC: CPT

## 2020-05-15 PROCEDURE — 6370000000 HC RX 637 (ALT 250 FOR IP): Performed by: FAMILY MEDICINE

## 2020-05-15 PROCEDURE — 83880 ASSAY OF NATRIURETIC PEPTIDE: CPT

## 2020-05-15 PROCEDURE — 71046 X-RAY EXAM CHEST 2 VIEWS: CPT

## 2020-05-15 PROCEDURE — 99283 EMERGENCY DEPT VISIT LOW MDM: CPT

## 2020-05-15 PROCEDURE — 84484 ASSAY OF TROPONIN QUANT: CPT

## 2020-05-15 PROCEDURE — 93005 ELECTROCARDIOGRAM TRACING: CPT | Performed by: FAMILY MEDICINE

## 2020-05-15 RX ORDER — ASPIRIN 81 MG/1
162 TABLET, CHEWABLE ORAL ONCE
Status: COMPLETED | OUTPATIENT
Start: 2020-05-15 | End: 2020-05-15

## 2020-05-15 RX ADMIN — ASPIRIN 81 MG 162 MG: 81 TABLET ORAL at 12:48

## 2020-05-15 ASSESSMENT — PAIN DESCRIPTION - PAIN TYPE: TYPE: ACUTE PAIN

## 2020-05-15 ASSESSMENT — PAIN DESCRIPTION - LOCATION: LOCATION: CHEST

## 2020-05-15 ASSESSMENT — HEART SCORE: ECG: 1

## 2020-05-15 ASSESSMENT — PAIN DESCRIPTION - ONSET: ONSET: GRADUAL

## 2020-05-15 ASSESSMENT — PAIN DESCRIPTION - DESCRIPTORS: DESCRIPTORS: ACHING;DULL

## 2020-05-15 ASSESSMENT — PAIN DESCRIPTION - FREQUENCY: FREQUENCY: CONTINUOUS

## 2020-05-15 ASSESSMENT — PAIN SCALES - GENERAL: PAINLEVEL_OUTOF10: 2

## 2020-05-15 NOTE — ED NOTES
Pt medicated per order. Updated on POC. Denies needs at this time.       Christine Gosselin, RN  05/15/20 4641

## 2020-05-15 NOTE — ED PROVIDER NOTES
3    omega-3 acid ethyl esters (LOVAZA) 1 g capsule Take 2 capsules by mouth 2 times daily 60 capsule 3    aspirin 81 MG tablet Take 81 mg by mouth daily          ALLERGIES   Allergies   Allergen Reactions    Shellfish-Derived Products         SOCIAL & FAMILY HISTORY   Social History     Socioeconomic History    Marital status: Single     Spouse name: None    Number of children: None    Years of education: None    Highest education level: None   Occupational History    None   Social Needs    Financial resource strain: None    Food insecurity     Worry: None     Inability: None    Transportation needs     Medical: None     Non-medical: None   Tobacco Use    Smoking status: Never Smoker    Smokeless tobacco: Never Used   Substance and Sexual Activity    Alcohol use: Yes     Comment: social    Drug use: No    Sexual activity: None   Lifestyle    Physical activity     Days per week: None     Minutes per session: None    Stress: None   Relationships    Social connections     Talks on phone: None     Gets together: None     Attends Jew service: None     Active member of club or organization: None     Attends meetings of clubs or organizations: None     Relationship status: None    Intimate partner violence     Fear of current or ex partner: None     Emotionally abused: None     Physically abused: None     Forced sexual activity: None   Other Topics Concern    None   Social History Narrative    None      Family History   Problem Relation Age of Onset    Arthritis Mother         PHYSICAL EXAM   VITAL SIGNS: /66   Pulse 57   Temp 98 °F (36.7 °C)   Resp 19   Ht 5' 10\" (1.778 m)   Wt 172 lb (78 kg)   SpO2 95%   BMI 24.68 kg/m²    Constitutional: Well developed, well nourished, no acute distress   HENT: Atraumatic, moist mucus membranes   Neck: supple, no JVD   Respiratory: Lungs Clear, no retractions   Cardiovascular: Reg rate, normal rhythm, no murmurs; left-sided chest wall pain in non-reproducible. Vascular: Radial pulses 2+ equal bilaterally   GI: Soft, nontender, normal bowel sounds   Musculoskeletal: No edema, no deformities   Integument: Skin warm and dry, no petechiae   Neurologic: Alert & oriented, normal speech   Psych: Pleasant affect, no hallucinations     EKG (interpreted by me) 5/15/2020 11:56  Rhythm: Sinus   Rate: 55 BPM   Axis: normal   Conduction: 1st degree AV block  msec  ST/T Segments: nonacute, with non-specific changes in anterior leads I, aVL    RADIOLOGY/PROCEDURES   XR CHEST STANDARD (2 VW)   Final Result      No acute cardiopulmonary process. Multilevel bridging anterior syndesmophytes. Correlate for an inflammatory arthropathy such as ankylosing spondylitis. Winda He Appearance is similar to prior      **This report has been created using voice recognition software. It may contain minor errors which are inherent in voice recognition technology. **      Final report electronically signed by Dr. Jacobs Or on 5/15/2020 12:45 PM           LABS   Labs Reviewed   ANION GAP - Abnormal; Notable for the following components:       Result Value    Anion Gap 7.0 (*)     All other components within normal limits   GLOMERULAR FILTRATION RATE, ESTIMATED - Abnormal; Notable for the following components:    Est, Glom Filt Rate 67 (*)     All other components within normal limits   OSMOLALITY - Abnormal; Notable for the following components:    Osmolality Calc 271.4 (*)     All other components within normal limits   CBC WITH AUTO DIFFERENTIAL   TROPONIN   BASIC METABOLIC PANEL   BRAIN NATRIURETIC PEPTIDE        ED COURSE & MEDICAL DECISION MAKING   Pertinent Labs & Imaging studies reviewed and interpreted. (See chart for details)   See chart for details of medications given during the ED stay.    Vitals:    05/15/20 1246   BP: 128/66   Pulse: 57   Resp: 19   Temp:    SpO2: 95%        Consultation: pending workup    The transition of care will be at 1:23 PM.     Differential

## 2020-05-15 NOTE — TELEPHONE ENCOUNTER
CHINTAN. Luana Godfrey Luana Boop Patient calls today complaining of chest pain on the left side for a few days, he thought he may have strained a muscle from doing some shoveling a few days ago but it's not getting better and now he feels weak also. He was waiting to be transferred to Nurse Triage but ended up leaving a message for them to call back. He wants to wait for Nurse Triage to call him back. He said he will go to ED if feels worse. He still feels it is related to a strain.     DOLV  4/9/20  DONV  10/12/20

## 2020-05-15 NOTE — TELEPHONE ENCOUNTER
I talked to patient and advised that he go to the ER. I offered a video visit with Reji Vance but also stated that at this time he might possibly need a full workup including possible labs and ekg which cannot be completed virtually. Patient agreed that he should be seen and stated he would go to ER. After patient asked how to do an ER visit I advised to walk into Emergency and tell them he was having chest pain and they would take good care of him. This message was sent after speaking with the patient.

## 2020-05-18 ENCOUNTER — OFFICE VISIT (OUTPATIENT)
Dept: FAMILY MEDICINE CLINIC | Age: 65
End: 2020-05-18
Payer: MEDICARE

## 2020-05-18 VITALS
DIASTOLIC BLOOD PRESSURE: 92 MMHG | TEMPERATURE: 97.8 F | HEART RATE: 68 BPM | BODY MASS INDEX: 24.94 KG/M2 | WEIGHT: 173.8 LBS | OXYGEN SATURATION: 98 % | SYSTOLIC BLOOD PRESSURE: 172 MMHG

## 2020-05-18 PROBLEM — R07.82 INTERCOSTAL PAIN: Status: ACTIVE | Noted: 2020-05-18

## 2020-05-18 PROBLEM — S29.011A INTERCOSTAL MUSCLE STRAIN: Status: ACTIVE | Noted: 2020-05-18

## 2020-05-18 PROCEDURE — G8420 CALC BMI NORM PARAMETERS: HCPCS | Performed by: NURSE PRACTITIONER

## 2020-05-18 PROCEDURE — G8427 DOCREV CUR MEDS BY ELIG CLIN: HCPCS | Performed by: NURSE PRACTITIONER

## 2020-05-18 PROCEDURE — 1036F TOBACCO NON-USER: CPT | Performed by: NURSE PRACTITIONER

## 2020-05-18 PROCEDURE — 3017F COLORECTAL CA SCREEN DOC REV: CPT | Performed by: NURSE PRACTITIONER

## 2020-05-18 PROCEDURE — 4040F PNEUMOC VAC/ADMIN/RCVD: CPT | Performed by: NURSE PRACTITIONER

## 2020-05-18 PROCEDURE — 1123F ACP DISCUSS/DSCN MKR DOCD: CPT | Performed by: NURSE PRACTITIONER

## 2020-05-18 PROCEDURE — 99214 OFFICE O/P EST MOD 30 MIN: CPT | Performed by: NURSE PRACTITIONER

## 2020-05-18 RX ORDER — AMLODIPINE BESYLATE 10 MG/1
10 TABLET ORAL DAILY
Qty: 90 TABLET | Refills: 1 | Status: SHIPPED | OUTPATIENT
Start: 2020-05-18 | End: 2020-11-10

## 2020-05-18 ASSESSMENT — ENCOUNTER SYMPTOMS
WHEEZING: 0
NAUSEA: 0
SINUS PRESSURE: 0
ABDOMINAL PAIN: 0
EYE DISCHARGE: 0
VOMITING: 0
VOICE CHANGE: 0
TROUBLE SWALLOWING: 0
SINUS PAIN: 0
COUGH: 0
SORE THROAT: 0
RHINORRHEA: 0

## 2020-05-18 NOTE — PROGRESS NOTES
times daily, Disp: 60 capsule, Rfl: 3    aspirin 81 MG tablet, Take 81 mg by mouth daily, Disp: , Rfl:     The patientis allergic to shellfish-derived products. Past Medical History  Orville Childs  has a past medical history of Ankylosing spondylitis ([address] Migraine, and TIA (transient ischemic attack). Past Surgical History  The patient  has a past surgical history that includes knee surgery (1983); Myringotomy Tympanostomy Tube Placement (Left, 04/16/2013); and sinus surgery. Family History  This patient's family history includes Arthritis in his mother. Social History  Orville Childs  reports that he has never smoked. He has never used smokeless tobacco. He reports current alcohol use. He reports that he does not use drugs. Health Maintenance  Health Maintenance Due   Topic Date Due    Hepatitis C screen  1955    HIV screen  05/20/1969    DTaP/Tdap/Td vaccine (1 - Tdap) 05/20/1973    Shingles Vaccine (1 of 2) 05/20/2004    Colon Cancer Screen FIT/FOBT  11/07/2018       Subjective:     Review of Systems   Constitutional: Negative for activity change, appetite change, chills, diaphoresis, fatigue and fever. HENT: Negative for congestion, ear pain, postnasal drip, rhinorrhea, sinus pressure, sinus pain, sneezing, sore throat, tinnitus, trouble swallowing and voice change. Eyes: Negative for discharge and visual disturbance (wears glasses). Dry eyes   Respiratory: Negative for cough and wheezing. Cardiovascular: Positive for chest pain (with movement ). Negative for palpitations. Gastrointestinal: Negative for abdominal pain, nausea and vomiting. Genitourinary: Negative for decreased urine volume. Musculoskeletal: Positive for arthralgias and myalgias. Negative for joint swelling and neck pain. Skin: Negative for rash. Neurological: Negative for weakness, numbness and headaches.    Psychiatric/Behavioral: Negative for agitation, behavioral problems, confusion, decreased

## 2020-06-12 ENCOUNTER — HOSPITAL ENCOUNTER (OUTPATIENT)
Dept: NON INVASIVE DIAGNOSTICS | Age: 65
Discharge: HOME | End: 2020-06-12
Payer: MEDICARE

## 2020-06-12 LAB
LV EF: 55 %
LV EF: 62 %
LVEF MODALITY: NORMAL
LVEF MODALITY: NORMAL

## 2020-06-12 PROCEDURE — A9500 TC99M SESTAMIBI: HCPCS | Performed by: NURSE PRACTITIONER

## 2020-06-12 PROCEDURE — 78452 HT MUSCLE IMAGE SPECT MULT: CPT

## 2020-06-12 PROCEDURE — 93017 CV STRESS TEST TRACING ONLY: CPT | Performed by: NUCLEAR MEDICINE

## 2020-06-12 PROCEDURE — 93306 TTE W/DOPPLER COMPLETE: CPT

## 2020-06-12 PROCEDURE — 3430000000 HC RX DIAGNOSTIC RADIOPHARMACEUTICAL: Performed by: NURSE PRACTITIONER

## 2020-06-12 RX ADMIN — Medication 34.7 MILLICURIE: at 15:00

## 2020-06-12 RX ADMIN — Medication 9.7 MILLICURIE: at 14:00

## 2020-06-15 NOTE — PROGRESS NOTES
tablet, Rfl: 3    omega-3 acid ethyl esters (LOVAZA) 1 g capsule, Take 2 capsules by mouth 2 times daily, Disp: 60 capsule, Rfl: 3    aspirin 81 MG tablet, Take 81 mg by mouth daily, Disp: , Rfl:     The patientis allergic to shellfish-derived products. Past Medical History  Mitesh Begum  has a past medical history of Ankylosing spondylitis ([address] Migraine, and TIA (transient ischemic attack). Past Surgical History  The patient  has a past surgical history that includes knee surgery (1983); Myringotomy Tympanostomy Tube Placement (Left, 04/16/2013); and sinus surgery. Family History  This patient's family history includes Arthritis in his mother. Social History  Mitesh Begum  reports that he has never smoked. He has never used smokeless tobacco. He reports current alcohol use. He reports that he does not use drugs. Health Maintenance  Health Maintenance Due   Topic Date Due    Hepatitis C screen  1955    DTaP/Tdap/Td vaccine (1 - Tdap) 05/20/1973    Shingles Vaccine (1 of 2) 05/20/2004    Colon Cancer Screen FIT/FOBT  11/07/2018       Subjective:     Review of Systems   Constitutional: Negative for activity change, appetite change, chills, diaphoresis, fatigue and fever. HENT: Negative for congestion, ear pain, postnasal drip, rhinorrhea, sinus pressure, sinus pain, sneezing, sore throat, tinnitus, trouble swallowing and voice change. Eyes: Negative for discharge and visual disturbance (wears glasses). Dry eyes   Respiratory: Negative for cough and wheezing. Cardiovascular: Negative for chest pain (resolved) and palpitations. Gastrointestinal: Negative for abdominal pain, nausea and vomiting. Genitourinary: Negative for decreased urine volume. Musculoskeletal: Positive for arthralgias and myalgias. Negative for joint swelling and neck pain. Skin: Negative for rash. Neurological: Negative for weakness, numbness and headaches.    Psychiatric/Behavioral: Negative for agitation, msec                                             TV Peak A-Wave: 41.1 cm/s   MV P1/2t: 126 msec   MVA by PHT:1.75 cm^2       AV P1/2t: 761 msec    TV Peak Gradient: 1.57                                                    mmHg   MV E' Septal Velocity: 5.2                       TR Velocity:247 cm/s   cm/s                                             TR Gradient:24.4 mmHg   MV A' Septal Velocity:     AV DVI (Vmax):0.83    PV Peak Velocity: 58.3   12.9 cm/s                                        cm/s   MV E' Lateral Velocity:                          PV Peak Gradient: 1.36   4.9 cm/s                                         mmHg   MV A' Lateral Velocity:   15.9 cm/s   E/E' septal: 14.58                               MD ED Velocity: 84.2 cm/s   E/E' lateral: 15.47   MR Velocity: 353 cm/s     [email or web address] [id number]            Specimen Collected: 06/12/20 12:45 Last Resulted           NM Cardiac Stress Test Nuclear Imaging- Treadmill   Order: [id number]   Status:  Final result   Visible to patient:  No (Not Released)   Details     Reading Physician Reading Date Result Priority   Asiya Mathur MD  [phone] 6/12/2020       Narrative & Impression     Cardiac Perfusion Imaging      Demographics      Patient Name   Elio Counter      Gender                Male      MR #           [id number]          Race                                                          Ethnicity      Account #      [de-identified]          Room Number      Accession      [id number]          Date of study         06/12/2020   Number      Date of Birth  1955         Referring Physician   Igor Ambrosio CNP      Age            76 year(s)         NM Technologist       [id number]      Stress Staff   KenyonClyde Reffitt,  Alicja Rivas

## 2020-06-17 ENCOUNTER — OFFICE VISIT (OUTPATIENT)
Dept: FAMILY MEDICINE CLINIC | Age: 65
End: 2020-06-17
Payer: MEDICARE

## 2020-06-17 VITALS
TEMPERATURE: 98.9 F | BODY MASS INDEX: 24.48 KG/M2 | DIASTOLIC BLOOD PRESSURE: 74 MMHG | OXYGEN SATURATION: 98 % | HEART RATE: 61 BPM | WEIGHT: 170.6 LBS | SYSTOLIC BLOOD PRESSURE: 134 MMHG

## 2020-06-17 PROBLEM — Q24.8: Status: ACTIVE | Noted: 2020-06-17

## 2020-06-17 PROCEDURE — 4040F PNEUMOC VAC/ADMIN/RCVD: CPT | Performed by: NURSE PRACTITIONER

## 2020-06-17 PROCEDURE — G8427 DOCREV CUR MEDS BY ELIG CLIN: HCPCS | Performed by: NURSE PRACTITIONER

## 2020-06-17 PROCEDURE — G8420 CALC BMI NORM PARAMETERS: HCPCS | Performed by: NURSE PRACTITIONER

## 2020-06-17 PROCEDURE — 1123F ACP DISCUSS/DSCN MKR DOCD: CPT | Performed by: NURSE PRACTITIONER

## 2020-06-17 PROCEDURE — 3017F COLORECTAL CA SCREEN DOC REV: CPT | Performed by: NURSE PRACTITIONER

## 2020-06-17 PROCEDURE — 99214 OFFICE O/P EST MOD 30 MIN: CPT | Performed by: NURSE PRACTITIONER

## 2020-06-17 PROCEDURE — 1036F TOBACCO NON-USER: CPT | Performed by: NURSE PRACTITIONER

## 2020-06-17 ASSESSMENT — ENCOUNTER SYMPTOMS
SINUS PRESSURE: 0
VOICE CHANGE: 0
COUGH: 0
NAUSEA: 0
EYE DISCHARGE: 0
VOMITING: 0
SORE THROAT: 0
TROUBLE SWALLOWING: 0
ABDOMINAL PAIN: 0
RHINORRHEA: 0
SINUS PAIN: 0
WHEEZING: 0

## 2020-06-22 ENCOUNTER — HOSPITAL ENCOUNTER (OUTPATIENT)
Age: 65
Setting detail: SPECIMEN
Discharge: HOME | End: 2020-06-22
Payer: MEDICARE

## 2020-06-22 LAB
ABSOLUTE EOS #: 0.27 K/UL (ref 0–0.44)
ABSOLUTE IMMATURE GRANULOCYTE: 0.03 K/UL (ref 0–0.3)
ABSOLUTE LYMPH #: 2.22 K/UL (ref 1.1–3.7)
ABSOLUTE MONO #: 0.81 K/UL (ref 0.1–1.2)
ALBUMIN SERPL-MCNC: 4.2 G/DL (ref 3.5–5.2)
ALBUMIN/GLOBULIN RATIO: 1.4 (ref 1–2.5)
ALP BLD-CCNC: 56 U/L (ref 40–129)
ALT SERPL-CCNC: 13 U/L (ref 5–41)
ANION GAP SERPL CALCULATED.3IONS-SCNC: 14 MMOL/L (ref 9–17)
AST SERPL-CCNC: 25 U/L
BASOPHILS # BLD: 1 % (ref 0–2)
BASOPHILS ABSOLUTE: 0.05 K/UL (ref 0–0.2)
BILIRUB SERPL-MCNC: 0.73 MG/DL (ref 0.3–1.2)
BUN BLDV-MCNC: 18 MG/DL (ref 8–23)
BUN/CREAT BLD: ABNORMAL (ref 9–20)
CALCIUM SERPL-MCNC: 9 MG/DL (ref 8.6–10.4)
CHLORIDE BLD-SCNC: 107 MMOL/L (ref 98–107)
CHOLESTEROL/HDL RATIO: 6.2
CHOLESTEROL: 230 MG/DL
CO2: 18 MMOL/L (ref 20–31)
CREAT SERPL-MCNC: 1.16 MG/DL (ref 0.7–1.2)
DIFFERENTIAL TYPE: NORMAL
EOSINOPHILS RELATIVE PERCENT: 3 % (ref 1–4)
GFR AFRICAN AMERICAN: >60 ML/MIN
GFR NON-AFRICAN AMERICAN: >60 ML/MIN
GFR SERPL CREATININE-BSD FRML MDRD: ABNORMAL ML/MIN/{1.73_M2}
GFR SERPL CREATININE-BSD FRML MDRD: ABNORMAL ML/MIN/{1.73_M2}
GLUCOSE FASTING: 77 MG/DL (ref 70–99)
HCT VFR BLD CALC: 48.2 % (ref 40.7–50.3)
HDLC SERPL-MCNC: 37 MG/DL
HEMOGLOBIN: 15.1 G/DL (ref 13–17)
IMMATURE GRANULOCYTES: 0 %
LDL CHOLESTEROL: 153 MG/DL (ref 0–130)
LYMPHOCYTES # BLD: 28 % (ref 24–43)
MAGNESIUM: 2.4 MG/DL (ref 1.6–2.6)
MCH RBC QN AUTO: 27.9 PG (ref 25.2–33.5)
MCHC RBC AUTO-ENTMCNC: 31.3 G/DL (ref 28.4–34.8)
MCV RBC AUTO: 88.9 FL (ref 82.6–102.9)
MONOCYTES # BLD: 10 % (ref 3–12)
NRBC AUTOMATED: 0 PER 100 WBC
PDW BLD-RTO: 12.8 % (ref 11.8–14.4)
PLATELET # BLD: 266 K/UL (ref 138–453)
PLATELET ESTIMATE: NORMAL
PMV BLD AUTO: 12.4 FL (ref 8.1–13.5)
POTASSIUM SERPL-SCNC: 4.3 MMOL/L (ref 3.7–5.3)
RBC # BLD: 5.42 M/UL (ref 4.21–5.77)
RBC # BLD: NORMAL 10*6/UL
SEG NEUTROPHILS: 58 % (ref 36–65)
SEGMENTED NEUTROPHILS ABSOLUTE COUNT: 4.68 K/UL (ref 1.5–8.1)
SODIUM BLD-SCNC: 139 MMOL/L (ref 135–144)
TOTAL PROTEIN: 7.3 G/DL (ref 6.4–8.3)
TRIGL SERPL-MCNC: 199 MG/DL
TSH SERPL DL<=0.05 MIU/L-ACNC: 3.32 MIU/L (ref 0.3–5)
VLDLC SERPL CALC-MCNC: ABNORMAL MG/DL (ref 1–30)
WBC # BLD: 8.1 K/UL (ref 3.5–11.3)
WBC # BLD: NORMAL 10*3/UL

## 2020-07-10 ENCOUNTER — OFFICE VISIT (OUTPATIENT)
Dept: ENT CLINIC | Age: 65
End: 2020-07-10
Payer: MEDICARE

## 2020-07-10 VITALS
WEIGHT: 171 LBS | BODY MASS INDEX: 24.54 KG/M2 | RESPIRATION RATE: 14 BRPM | HEART RATE: 70 BPM | DIASTOLIC BLOOD PRESSURE: 82 MMHG | TEMPERATURE: 97.1 F | SYSTOLIC BLOOD PRESSURE: 134 MMHG

## 2020-07-10 PROCEDURE — G8427 DOCREV CUR MEDS BY ELIG CLIN: HCPCS | Performed by: PHYSICIAN ASSISTANT

## 2020-07-10 PROCEDURE — 3017F COLORECTAL CA SCREEN DOC REV: CPT | Performed by: PHYSICIAN ASSISTANT

## 2020-07-10 PROCEDURE — 99213 OFFICE O/P EST LOW 20 MIN: CPT | Performed by: PHYSICIAN ASSISTANT

## 2020-07-10 PROCEDURE — 1036F TOBACCO NON-USER: CPT | Performed by: PHYSICIAN ASSISTANT

## 2020-07-10 PROCEDURE — G8420 CALC BMI NORM PARAMETERS: HCPCS | Performed by: PHYSICIAN ASSISTANT

## 2020-07-10 PROCEDURE — 1123F ACP DISCUSS/DSCN MKR DOCD: CPT | Performed by: PHYSICIAN ASSISTANT

## 2020-07-10 PROCEDURE — 4040F PNEUMOC VAC/ADMIN/RCVD: CPT | Performed by: PHYSICIAN ASSISTANT

## 2020-07-10 ASSESSMENT — ENCOUNTER SYMPTOMS
DIARRHEA: 0
TROUBLE SWALLOWING: 0
RHINORRHEA: 0
APNEA: 0
ABDOMINAL PAIN: 0
SINUS PRESSURE: 0
WHEEZING: 0
STRIDOR: 0
FACIAL SWELLING: 0
VOMITING: 0
CHEST TIGHTNESS: 0
SHORTNESS OF BREATH: 0
COLOR CHANGE: 0
VOICE CHANGE: 0
COUGH: 0
SORE THROAT: 0
CHOKING: 0
NAUSEA: 0

## 2020-07-10 NOTE — PROGRESS NOTES
[facility] PROFESSIONAL SERVS  [facility] DEEPTI'S EAR, NOSE AND THROAT  [facility]  Dept: [phone]  Dept Fax: [phone]  Loc: [phone]    Prudence Aponte is a 76 y.o. male who was referred by No ref. provider found for:  Chief Complaint   Patient presents with    Other     Here for tube check. C/O LT ear fullness and trouble hearing   . HPI:     The patient presents for evaluation of left ear fullness. The patient had a left tube placed by Dr. Magalis Reyes in the office on 1/7/2020. The patient reports he has been doing great until about 2-3 weeks ago. He states that around that time his left ear seemed to plug up and has remained plugged since. He says it sounds like he is underwater. He denies otalgia, otorrhea, fevers, chills, congestion, rhinorrhea, sneezing, pruritic/watery eyes. He states his hearing aid on the left does not help much currently. He denies a history of seasonal allergies and recurrent sinus infections. He states he was feeling otherwise healthy when the ear plugged up. He reports that he has had 3 or 4 tubes in the left ear alone. He denies any other concerns at this time. Subjective:        Review of Systems   Constitutional: Negative for activity change, appetite change, chills, diaphoresis, fatigue, fever and unexpected weight change. HENT: Positive for ear pain (fullness) and hearing loss. Negative for congestion, dental problem, ear discharge, facial swelling, mouth sores, nosebleeds, postnasal drip, rhinorrhea, sinus pressure, sneezing, sore throat, tinnitus, trouble swallowing and voice change. Eyes: Negative for visual disturbance. Respiratory: Negative for apnea, cough, choking, chest tightness, shortness of breath, wheezing and stridor. Cardiovascular: Negative for chest pain, palpitations and leg swelling. Gastrointestinal: Negative for abdominal pain, diarrhea, nausea and vomiting.    Endocrine: Negative for cold intolerance, heat intolerance, polydipsia and polyuria. Genitourinary: Negative for difficulty urinating, discharge, dysuria, enuresis, hematuria, penile pain, penile swelling, scrotal swelling, testicular pain and urgency. Musculoskeletal: Negative for arthralgias, gait problem, neck pain and neck stiffness. Skin: Negative for color change, rash and wound. Allergic/Immunologic: Negative for environmental allergies, food allergies and immunocompromised state. Neurological: Negative for dizziness, seizures, syncope, facial asymmetry, speech difficulty, light-headedness and headaches. Hematological: Negative for adenopathy. Does not bruise/bleed easily. Psychiatric/Behavioral: Negative for confusion, sleep disturbance and suicidal ideas. The patient is not nervous/anxious. Past Medical History:  Past Medical History:   Diagnosis Date    [address] ([facility] [address]     Migraine     TIA (transient ischemic attack)        Social History:    TOBACCO:   reports that he has never smoked. He has never used smokeless tobacco.  ETOH:   reports current alcohol use. DRUGS:   reports no history of drug use. Family History:       Problem Relation Age of Onset    Arthritis Mother        Surgical History:  Past Surgical History:   Procedure Laterality Date    KNEE SURGERY  1983    both knees     MYRINGOTOMY AND TYMPANOSTOMY TUBE PLACEMENT Left 04/16/2013    Dr Chai Paul SINUS SURGERY          Objective: This is a 76 y.o. male. Patient is alert and oriented to person, place and time. Patient appears well developed, well nourished. Mood is happy with normal affect. Not obviously hearing impaired. No abnormality in speech noted. /82 (Site: Right Upper Arm, Position: Sitting)   Pulse 70   Temp 97.1 °F (36.2 °C)   Resp 14   Wt 171 lb (77.6 kg)   BMI 24.54 kg/m²     Head:   Normocephalic, atraumatic. No obvious masses or lesions noted.     Ears:  External ears: Normal: no scars, lesions or masses. Mastoid process: No erythema noted. No tenderness to palpation. R External auditory canal: clear and free of any pathology  L External auditory canal: Extruded tube noted in the lateral canal with crusting around it. I removed the tube using an alligator forceps. Otherwise normal appearing canal.   Tympanic membranes:  R intact, translucent                                                  L Serous effusion   [place]:   [place]:  Right Ear:  512 hz - Result positive (air conduction greater than bone conduction)               Left Ear:  512 hz - Result negative (bone conduction is greater than air conduction)  [place]: 512 hz.  Result - lateralizes to the left    Assessment/Plan:     Diagnosis Orders   1. Recurrent serous otitis media, left     2. Conductive hearing loss of left ear with restricted hearing of right ear     3. Wears hearing aid in both ears     4. Extrusion of left tympanic ventilation tube, initial encounter         The patient is a 76 y.o. male that presents for evaluation of left ear fullness. The tube has extruded and the patient has serous effusion. I discussed the patient with Dr. Verona Gomez who agreed the patient can come back to the office in the next 1-2 weeks for a tube placement. The patient expresses understanding and is appreciative. He will contact the office sooner with new or worsening symptoms.      Electronically signed by LIZANDRO Heath on 7/10/2020 at 3:41 PM

## 2020-07-17 ENCOUNTER — PROCEDURE VISIT (OUTPATIENT)
Dept: ENT CLINIC | Age: 65
End: 2020-07-17
Payer: MEDICARE

## 2020-07-17 VITALS
WEIGHT: 186 LBS | HEART RATE: 68 BPM | SYSTOLIC BLOOD PRESSURE: 116 MMHG | BODY MASS INDEX: 26.69 KG/M2 | RESPIRATION RATE: 14 BRPM | TEMPERATURE: 97.5 F | DIASTOLIC BLOOD PRESSURE: 72 MMHG

## 2020-07-17 PROCEDURE — 1036F TOBACCO NON-USER: CPT | Performed by: OTOLARYNGOLOGY

## 2020-07-17 PROCEDURE — 69433 CREATE EARDRUM OPENING: CPT | Performed by: OTOLARYNGOLOGY

## 2020-07-17 PROCEDURE — 3017F COLORECTAL CA SCREEN DOC REV: CPT | Performed by: OTOLARYNGOLOGY

## 2020-07-17 PROCEDURE — 4040F PNEUMOC VAC/ADMIN/RCVD: CPT | Performed by: OTOLARYNGOLOGY

## 2020-07-17 PROCEDURE — G8417 CALC BMI ABV UP PARAM F/U: HCPCS | Performed by: OTOLARYNGOLOGY

## 2020-07-17 PROCEDURE — G8427 DOCREV CUR MEDS BY ELIG CLIN: HCPCS | Performed by: OTOLARYNGOLOGY

## 2020-07-17 PROCEDURE — 1123F ACP DISCUSS/DSCN MKR DOCD: CPT | Performed by: OTOLARYNGOLOGY

## 2020-07-17 ASSESSMENT — ENCOUNTER SYMPTOMS
WHEEZING: 0
ABDOMINAL PAIN: 0
APNEA: 0
DIARRHEA: 0
VOICE CHANGE: 0
SHORTNESS OF BREATH: 0
RHINORRHEA: 0
COUGH: 0
TROUBLE SWALLOWING: 0
STRIDOR: 0
SORE THROAT: 0
FACIAL SWELLING: 0
NAUSEA: 0
CHEST TIGHTNESS: 0
VOMITING: 0
CHOKING: 0
SINUS PRESSURE: 0
COLOR CHANGE: 0

## 2020-07-17 NOTE — PROGRESS NOTES
Tobacco Use    Smoking status: Never Smoker    Smokeless tobacco: Never Used   Substance Use Topics    Alcohol use: Yes     Comment: social       Subjective:       Review of Systems   Constitutional: Negative for activity change, appetite change, chills, diaphoresis, fatigue, fever and unexpected weight change. HENT: Negative for congestion, dental problem, ear discharge, ear pain, facial swelling, hearing loss, mouth sores, nosebleeds, postnasal drip, rhinorrhea, sinus pressure, sneezing, sore throat, tinnitus, trouble swallowing and voice change. Eyes: Negative for visual disturbance. Respiratory: Negative for apnea, cough, choking, chest tightness, shortness of breath, wheezing and stridor. Cardiovascular: Negative for chest pain, palpitations and leg swelling. Gastrointestinal: Negative for abdominal pain, diarrhea, nausea and vomiting. Endocrine: Negative for cold intolerance, heat intolerance, polydipsia and polyuria. Genitourinary: Negative for dysuria, enuresis and hematuria. Musculoskeletal: Negative for arthralgias, gait problem, neck pain and neck stiffness. Skin: Negative for color change and rash. Allergic/Immunologic: Negative for environmental allergies, food allergies and immunocompromised state. Neurological: Negative for dizziness, syncope, facial asymmetry, speech difficulty, light-headedness and headaches. Hematological: Negative for adenopathy. Does not bruise/bleed easily. Psychiatric/Behavioral: Negative for confusion and sleep disturbance. The patient is not nervous/anxious.         Objective:     /72 (Site: Left Upper Arm, Position: Sitting)   Pulse 68   Temp 97.5 °F (36.4 °C)   Resp 14   Wt 186 lb (84.4 kg)   BMI 26.69 kg/m²     Physical Exam      PROCEDURE: MYRINGOTOMY AND TYMPANOSTOMY TUBE PLACEMENT, UNILATERAL    An appropriate informed consent was obtained, including benefits and risks, answering all of the patient's questions, and explaining other options and that there were no guarantees. The patient requested that we proceed. After cleaning the Left ear canal, topical anesthesia of the posterior-inferior quadrant of the tympanic membrane was obtained with Phenol. After this had taken effect, a radial incision was created in the posteroinferior quadrant of the tympanic membrane, and the middle ear examined with the following findings serous fluid, suctioned. An Agosto ventilation tube was placed without difficulty. Left hearing down. Little cerumen impaction, couldn't make a seal.      Data:  All of the past medical history, past surgical history, family history,social history, allergies and current medications were reviewed with the patient. Assessment & Plan   Diagnoses and all orders for this visit:     Diagnosis Orders   1. Recurrent serous otitis media, left  MS CREATE EARDRUM OPENING,LOCAL ANESTH   2. Conductive hearing loss of left ear with restricted hearing of right ear  MS CREATE EARDRUM OPENING,LOCAL ANESTH   3. Wears hearing aid in both ears     4. Extrusion of left tympanic ventilation tube, initial encounter  MS CREATE EARDRUM OPENING,LOCAL ANESTH       The findings were explained and his questions were answered. Options were discussed including placing a left ear tube in office. Discussed keeping water out of ear. Patient agreed    3 week follow up with Camila Mckeon CMA ([facility]), am scribing for, and in the presence of Dr. Litzy Isaac. Electronically signed by Thierry Francis CMA ([facility]) on 7/17/20 at 11:00 AM EDT. (Please note that portions of this note were completed with a voice recognition program. Efforts were made to edit the dictations butoccasionally words are mis-transcribed.)    I agree to the above documentation placed by my scribe. I have personally evaluated this patient. Additional findings are as noted.   I reviewed the scribe's note and agree with the documented findings and plan of care. Any areas of disagreement are corrected. I agree with the chief complaint, past medical history, past surgical history, allergies, medications, social and family history as documented unless otherwise noted below.      Electronically signed by Renee Fried MD on 7/31/2020 at 10:59 PM

## 2020-08-25 ENCOUNTER — OFFICE VISIT (OUTPATIENT)
Dept: FAMILY MEDICINE CLINIC | Age: 65
End: 2020-08-25
Payer: MEDICARE

## 2020-08-25 VITALS
HEART RATE: 80 BPM | TEMPERATURE: 97.7 F | WEIGHT: 172 LBS | BODY MASS INDEX: 24.68 KG/M2 | DIASTOLIC BLOOD PRESSURE: 76 MMHG | OXYGEN SATURATION: 97 % | SYSTOLIC BLOOD PRESSURE: 136 MMHG

## 2020-08-25 LAB
BILIRUBIN, POC: NEGATIVE
BLOOD URINE, POC: NEGATIVE
CLARITY, POC: CLEAR
COLOR, POC: YELLOW
GLUCOSE URINE, POC: NEGATIVE
KETONES, POC: NEGATIVE
LEUKOCYTE EST, POC: NEGATIVE
NITRITE, POC: NEGATIVE
PH, POC: 5.5
PROTEIN, POC: NEGATIVE
SPECIFIC GRAVITY, POC: 1.02
UROBILINOGEN, POC: 1

## 2020-08-25 PROCEDURE — 3017F COLORECTAL CA SCREEN DOC REV: CPT | Performed by: NURSE PRACTITIONER

## 2020-08-25 PROCEDURE — 1123F ACP DISCUSS/DSCN MKR DOCD: CPT | Performed by: NURSE PRACTITIONER

## 2020-08-25 PROCEDURE — 4040F PNEUMOC VAC/ADMIN/RCVD: CPT | Performed by: NURSE PRACTITIONER

## 2020-08-25 PROCEDURE — 99214 OFFICE O/P EST MOD 30 MIN: CPT | Performed by: NURSE PRACTITIONER

## 2020-08-25 PROCEDURE — G8420 CALC BMI NORM PARAMETERS: HCPCS | Performed by: NURSE PRACTITIONER

## 2020-08-25 PROCEDURE — 1036F TOBACCO NON-USER: CPT | Performed by: NURSE PRACTITIONER

## 2020-08-25 PROCEDURE — G8427 DOCREV CUR MEDS BY ELIG CLIN: HCPCS | Performed by: NURSE PRACTITIONER

## 2020-08-25 PROCEDURE — 81003 URINALYSIS AUTO W/O SCOPE: CPT | Performed by: NURSE PRACTITIONER

## 2020-08-25 ASSESSMENT — ENCOUNTER SYMPTOMS
SORE THROAT: 0
SINUS PRESSURE: 0
RHINORRHEA: 0
TROUBLE SWALLOWING: 0
ABDOMINAL PAIN: 0
VOICE CHANGE: 0
WHEEZING: 0
NAUSEA: 0
COUGH: 0
EYE DISCHARGE: 0
SINUS PAIN: 0
VOMITING: 0

## 2020-08-25 NOTE — PROGRESS NOTES
[address] [facility],[address] [facility]. [place] [address] [facility]  Dept: [phone]  Dept Fax: : [phone]  Loc Fax: [phone]     Anupama Walden is a 76 y.o. male who presents today for his medical conditions/complaintsas noted below. Chief Complaint   Patient presents with    Back Pain     right lower lumbar, states this lasted about 24 hours over saturday and now completely gone, musclar type of pain, when bad rated 6/10 pain, states when he stacked his pillows and sat up he had no pain    Other     states he passed kidney stone in past, pain felt similar but not as tense        HPI:      Right flank pain  Onset Friday  Constant dull pain  Rates 6/10  Worse with positioning  Helped to add pillows  States that Sunday pain resolved  Hx of kidney stones  Has chronic pain that is in his pelvis that is the same / Hx of spondylitis      Medications:    Current Outpatient Medications:     amLODIPine (NORVASC) 10 MG tablet, Take 1 tablet by mouth daily, Disp: 90 tablet, Rfl: 1    fenofibrate (TRIGLIDE) 160 MG tablet, Take 1 tablet by mouth daily, Disp: 90 tablet, Rfl: 3    atorvastatin (LIPITOR) 80 MG tablet, Take 1 tablet by mouth nightly, Disp: 90 tablet, Rfl: 3    levothyroxine (SYNTHROID) 25 MCG tablet, TAKE 1 TABLET BY MOUTH DAILY WITH WATER ON AN EMPTY STOMACH, WAIT 30 MINUTES BEFORE EATING OR TAKING OTHER MEDS, Disp: 90 tablet, Rfl: 3    omega-3 acid ethyl esters (LOVAZA) 1 g capsule, Take 2 capsules by mouth 2 times daily, Disp: 60 capsule, Rfl: 3    aspirin 81 MG tablet, Take 81 mg by mouth daily, Disp: , Rfl:     The patientis allergic to shellfish-derived products. Past Medical History  Oliver Espinosa  has a past medical history of Ankylosing spondylitis ([facility] [address] Migraine, and TIA (transient ischemic attack). Past Surgical History  The patient  has a past surgical history that includes knee surgery (1983);  Myringotomy Tympanostomy Tube Placement (Left, 04/16/2013); and sinus surgery. Family History  This patient's family history includes Arthritis in his mother. Social History  Oliver Espinosa  reports that he has never smoked. He has never used smokeless tobacco. He reports current alcohol use. He reports that he does not use drugs. Health Maintenance  Health Maintenance Due   Topic Date Due    Hepatitis C screen  1955    DTaP/Tdap/Td vaccine (1 - Tdap) 05/20/1973    Shingles Vaccine (1 of 2) 05/20/2004    Colon Cancer Screen FIT/FOBT  11/07/2018    Annual Wellness Visit (AWV)  09/19/2020       Subjective:     Review of Systems   Constitutional: Negative for activity change, appetite change, chills, diaphoresis, fatigue and fever. HENT: Negative for congestion, ear pain, postnasal drip, rhinorrhea, sinus pressure, sinus pain, sneezing, sore throat, tinnitus, trouble swallowing and voice change. Eyes: Negative for discharge and visual disturbance (wears glasses). Dry eyes   Respiratory: Negative for cough and wheezing. Cardiovascular: Negative for chest pain (resolved) and palpitations. Gastrointestinal: Negative for abdominal pain, nausea and vomiting. Genitourinary: Positive for flank pain. Negative for decreased urine volume. Musculoskeletal: Positive for arthralgias and myalgias. Negative for joint swelling and neck pain. Skin: Negative for rash. Neurological: Negative for weakness, numbness and headaches. Psychiatric/Behavioral: Negative for agitation, behavioral problems, confusion, decreased concentration, self-injury, sleep disturbance and suicidal ideas. The patient is not nervous/anxious. Objective:     /76 (Site: Left Upper Arm, Position: Sitting, Cuff Size: Medium Adult)   Pulse 80   Temp 97.7 °F (36.5 °C) (Temporal)   Wt 172 lb (78 kg)   SpO2 97%   BMI 24.68 kg/m²      Physical Exam  Vitals signs reviewed. Constitutional:       Appearance: He is well-developed.    HENT: Head: Normocephalic and atraumatic. Right Ear: External ear normal.      Left Ear: External ear normal.   Eyes:      Conjunctiva/sclera: Conjunctivae normal.   Neck:      Musculoskeletal: Normal range of motion and neck supple. No spinous process tenderness. Thyroid: No thyromegaly. Trachea: Trachea normal.   Cardiovascular:      Rate and Rhythm: Normal rate and regular rhythm. Heart sounds: Normal heart sounds. No murmur. No friction rub. No gallop. Pulmonary:      Effort: Pulmonary effort is normal.      Breath sounds: Normal breath sounds. Abdominal:      General: Bowel sounds are normal.      Palpations: Abdomen is soft. Tenderness: There is no abdominal tenderness. Musculoskeletal: Normal range of motion. Lumbar back: Normal.        Back:    Skin:     General: Skin is warm and dry. Findings: No erythema or rash. Neurological:      Mental Status: He is alert and oriented to person, place, and time. Psychiatric:         Speech: Speech normal.         Behavior: Behavior normal.         Thought Content: Thought content normal.       POCT Urinalysis No Micro (Auto)   Order: [id number]   Status:  Final result   Visible to patient:  No (not released) Dx:  Flank pain   Component  15:07   Color, UA  yellow     Clarity, UA  clear     Glucose, UA POC  negative     Bilirubin, UA  negative     Ketones, UA  negative     Spec Grav, UA  1.025     Blood, UA POC  negative     pH, UA  5.5     Protein, UA POC  negative     Urobilinogen, UA  1.0     Leukocytes, UA  negative     Nitrite, UA  negative           Specimen Collected: 08/25/20 15:07  Last Resulted: 08/25/20 15:08          Lab Flowsheet       Order Details                Assessment/Plan:      Mady Lennox was seen today for back pain and other.     Diagnoses and all orders for this visit:    Flank pain  -     POCT Urinalysis No Micro (Auto)    History of kidney stones    Ankylosing spondylitis of lumbar region [facility])    urine normal, is

## 2020-10-12 ENCOUNTER — OFFICE VISIT (OUTPATIENT)
Dept: FAMILY MEDICINE CLINIC | Age: 65
End: 2020-10-12
Payer: MEDICARE

## 2020-10-12 VITALS
WEIGHT: 171.8 LBS | BODY MASS INDEX: 26.97 KG/M2 | SYSTOLIC BLOOD PRESSURE: 128 MMHG | DIASTOLIC BLOOD PRESSURE: 72 MMHG | HEIGHT: 67 IN | OXYGEN SATURATION: 98 % | TEMPERATURE: 97.3 F | HEART RATE: 75 BPM

## 2020-10-12 PROCEDURE — G8484 FLU IMMUNIZE NO ADMIN: HCPCS | Performed by: NURSE PRACTITIONER

## 2020-10-12 PROCEDURE — G8417 CALC BMI ABV UP PARAM F/U: HCPCS | Performed by: NURSE PRACTITIONER

## 2020-10-12 PROCEDURE — G0008 ADMIN INFLUENZA VIRUS VAC: HCPCS | Performed by: NURSE PRACTITIONER

## 2020-10-12 PROCEDURE — G0009 ADMIN PNEUMOCOCCAL VACCINE: HCPCS | Performed by: NURSE PRACTITIONER

## 2020-10-12 PROCEDURE — 90694 VACC AIIV4 NO PRSRV 0.5ML IM: CPT | Performed by: NURSE PRACTITIONER

## 2020-10-12 PROCEDURE — 1123F ACP DISCUSS/DSCN MKR DOCD: CPT | Performed by: NURSE PRACTITIONER

## 2020-10-12 PROCEDURE — G8427 DOCREV CUR MEDS BY ELIG CLIN: HCPCS | Performed by: NURSE PRACTITIONER

## 2020-10-12 PROCEDURE — 4040F PNEUMOC VAC/ADMIN/RCVD: CPT | Performed by: NURSE PRACTITIONER

## 2020-10-12 PROCEDURE — 1036F TOBACCO NON-USER: CPT | Performed by: NURSE PRACTITIONER

## 2020-10-12 PROCEDURE — G0438 PPPS, INITIAL VISIT: HCPCS | Performed by: NURSE PRACTITIONER

## 2020-10-12 PROCEDURE — 90732 PPSV23 VACC 2 YRS+ SUBQ/IM: CPT | Performed by: NURSE PRACTITIONER

## 2020-10-12 PROCEDURE — 3017F COLORECTAL CA SCREEN DOC REV: CPT | Performed by: NURSE PRACTITIONER

## 2020-10-12 PROCEDURE — 99214 OFFICE O/P EST MOD 30 MIN: CPT | Performed by: NURSE PRACTITIONER

## 2020-10-12 ASSESSMENT — LIFESTYLE VARIABLES
HOW OFTEN DO YOU HAVE SIX OR MORE DRINKS ON ONE OCCASION: 0
HOW OFTEN DURING THE LAST YEAR HAVE YOU NEEDED AN ALCOHOLIC DRINK FIRST THING IN THE MORNING TO GET YOURSELF GOING AFTER A NIGHT OF HEAVY DRINKING: 0
AUDIT-C TOTAL SCORE: 3
HOW OFTEN DURING THE LAST YEAR HAVE YOU HAD A FEELING OF GUILT OR REMORSE AFTER DRINKING: 0
HOW MANY STANDARD DRINKS CONTAINING ALCOHOL DO YOU HAVE ON A TYPICAL DAY: 0
HAVE YOU OR SOMEONE ELSE BEEN INJURED AS A RESULT OF YOUR DRINKING: 0
HOW OFTEN DO YOU HAVE A DRINK CONTAINING ALCOHOL: 3
HOW OFTEN DURING THE LAST YEAR HAVE YOU BEEN UNABLE TO REMEMBER WHAT HAPPENED THE NIGHT BEFORE BECAUSE YOU HAD BEEN DRINKING: 0
HAS A RELATIVE, FRIEND, DOCTOR, OR ANOTHER HEALTH PROFESSIONAL EXPRESSED CONCERN ABOUT YOUR DRINKING OR SUGGESTED YOU CUT DOWN: 0
HOW OFTEN DURING THE LAST YEAR HAVE YOU FOUND THAT YOU WERE NOT ABLE TO STOP DRINKING ONCE YOU HAD STARTED: 0

## 2020-10-12 NOTE — PATIENT INSTRUCTIONS
Personalized Preventive Plan for Nataly Mondragon - 10/12/2020  Medicare offers a range of preventive health benefits. Some of the tests and screenings are paid in full while other may be subject to a deductible, co-insurance, and/or copay. Some of these benefits include a comprehensive review of your medical history including lifestyle, illnesses that may run in your family, and various assessments and screenings as appropriate. After reviewing your medical record and screening and assessments performed today your provider may have ordered immunizations, labs, imaging, and/or referrals for you. A list of these orders (if applicable) as well as your Preventive Care list are included within your After Visit Summary for your review. Other Preventive Recommendations:    · A preventive eye exam performed by an eye specialist is recommended every 1-2 years to screen for glaucoma; cataracts, macular degeneration, and other eye disorders. · A preventive dental visit is recommended every 6 months. · Try to get at least 150 minutes of exercise per week or 10,000 steps per day on a pedometer . · Order or download the FREE \"Exercise & Physical Activity: Your Everyday Guide\" from [employer]. Call 4-206.668.1257 or search [employer] online. · You need 2337-3842 mg of calcium and 5030-1551 IU of vitamin D per day. It is possible to meet your calcium requirement with diet alone, but a vitamin D supplement is usually necessary to meet this goal.  · When exposed to the sun, use a sunscreen that protects against both UVA and UVB radiation with an SPF of 30 or greater. Reapply every 2 to 3 hours or after sweating, drying off with a towel, or swimming. · Always wear a seat belt when traveling in a car. Always wear a helmet when riding a bicycle or motorcycle.

## 2020-10-12 NOTE — PROGRESS NOTES
Vaccine Information Sheet, \"Influenza - Inactivated\"  given to Whitney Parents, or parent/legal guardian of  Whitney Parents and verbalized understanding. Patient responses:    Have you ever had a reaction to a flu vaccine? No  Do you have an allergy to eggs, neomycin or polymixin? No  Do you have an allergy to Thimerosal, contact lens solution, or Merthiolate? No  Have you ever had Guillian Hopkins Syndrome? No  Do you have any current illness? No  Do you have a temperature above 100 degrees? No  Are you pregnant? No  If pregnant, permission obtained from physician? Not pregnant  Do you have an active neurological disorder? No      Flu vaccine given per order. Please see immunization tab. After obtaining consent, and per orders of Blessing Herrera CNP, injection of influenza 0.5mL and [id number] 0.5mL given in Right deltoid by Evonne Hernandez. Patient instructed to remain in clinic for 20 minutes afterwards, and to report any adverse reaction to me immediately. Immunizations Administered     Name Date Dose Route    Influenza, Quadv, adjuvanted, 65 yrs +, IM, PF (Fluad) 10/12/2020 0.5 mL Intramuscular    Site: Deltoid- Right    Lot: [id number]    NDC: 25882-025-28    Pneumococcal Polysaccharide ([id number]) 10/12/2020 0.5 mL Intramuscular    Site: Deltoid- Right    Lot: [id number]    NDC: 6173-7545-19              Pt tolerated well. VIS was given.

## 2020-10-12 NOTE — PROGRESS NOTES
[address] [facility],[address] [facility]. [place] [address] [facility]  Dept: [phone]  Dept Fax: : [phone]  Loc Fax: [phone]     Jose Carlos Zazueta is a 76 y.o. male who presents today for his medical conditions/complaintsas noted below. Chief Complaint   Patient presents with    Medicare AWV       HPI:      Hyperlipidemia. Onset years ago. Is taking statin and fenofibrate    Taking fish oil   Tolerating the high dose without side effects. The 10-year ASCVD risk score (Sheryl Lopez, et al., 2013) is: 16.9%    Values used to calculate the score:      Age: 71 years      Sex: Male      Is Non- : No      Diabetic: No      Tobacco smoker: No      Systolic Blood Pressure: 319 mmHg      Is BP treated: Yes      HDL Cholesterol: 36 mg/dL      Total Cholesterol: 222 mg/dL      HTN  Onset years ago  Taking norvasc. CP Or palpitations> no   Monitor BP at home 130/70  Hx of TIA involving carotid artery. Is here for fu of cardiac testing     Hypothyroidism  Onset years ago  Taking supplement  No skin hair or nail changes  Heat or cold intolerance?  No     History of depression  No current symptoms  Denies SI and HI    Vitamin b 12 deficient neuropathy  Onset years ago  Areas affected bilateral feet       Medications:    Current Outpatient Medications:     amLODIPine (NORVASC) 10 MG tablet, Take 1 tablet by mouth daily, Disp: 90 tablet, Rfl: 1    fenofibrate (TRIGLIDE) 160 MG tablet, Take 1 tablet by mouth daily, Disp: 90 tablet, Rfl: 3    atorvastatin (LIPITOR) 80 MG tablet, Take 1 tablet by mouth nightly, Disp: 90 tablet, Rfl: 3    levothyroxine (SYNTHROID) 25 MCG tablet, TAKE 1 TABLET BY MOUTH DAILY WITH WATER ON AN EMPTY STOMACH, WAIT 30 MINUTES BEFORE EATING OR TAKING OTHER MEDS, Disp: 90 tablet, Rfl: 3    omega-3 acid ethyl esters (LOVAZA) 1 g capsule, Take 2 capsules by mouth 2 times daily, Disp: 60 capsule, Rfl: 3   aspirin 81 MG tablet, Take 81 mg by mouth daily, Disp: , Rfl:     The patientis allergic to shellfish-derived products. Past Medical History  Taniya Bardales  has a past medical history of Ankylosing spondylitis ([address] Migraine, and TIA (transient ischemic attack). Past Surgical History  The patient  has a past surgical history that includes knee surgery (1983); Myringotomy Tympanostomy Tube Placement (Left, 04/16/2013); and sinus surgery. Family History  This patient's family history includes Arthritis in his mother. Social History  Taniya Bardales  reports that he has never smoked. He has never used smokeless tobacco. He reports current alcohol use. He reports that he does not use drugs. Health Maintenance  Health Maintenance Due   Topic Date Due    Hepatitis C screen  1955    DTaP/Tdap/Td vaccine (1 - Tdap) 05/20/1973    Shingles Vaccine (1 of 2) 05/20/2004    Colon Cancer Screen FIT/FOBT  11/07/2018    Annual Wellness Visit (AWV)  05/29/2019       Subjective:     Review of Systems   Constitutional: Negative for activity change, appetite change and fever. HENT: Negative for congestion, ear pain and rhinorrhea. Eyes: Negative for discharge and visual disturbance. Respiratory: Negative for cough, chest tightness and shortness of breath. Cardiovascular: Negative for chest pain and palpitations. Gastrointestinal: Negative for abdominal pain, constipation, diarrhea and vomiting. Genitourinary: Negative for difficulty urinating and hematuria. Musculoskeletal: Negative for arthralgias and myalgias. Skin: Negative for rash. Neurological: Negative for dizziness, weakness, numbness and headaches. Psychiatric/Behavioral: The patient is not nervous/anxious.         Objective:     /72 (Site: Left Upper Arm, Position: Sitting, Cuff Size: Large Adult)   Pulse 75   Temp 97.3 °F (36.3 °C) (Temporal)   Ht 5' 6.5\" (1.689 m)   Wt 171 lb 12.8 oz (77.9 kg)   SpO2 98%   BMI 27.31 kg/m² Physical Exam  Vitals signs reviewed. Constitutional:       Appearance: He is well-developed. HENT:      Head: Normocephalic and atraumatic. Right Ear: External ear normal.      Left Ear: External ear normal.   Eyes:      Conjunctiva/sclera: Conjunctivae normal.   Neck:      Musculoskeletal: Normal range of motion and neck supple. No spinous process tenderness. Thyroid: No thyromegaly. Trachea: Trachea normal.   Cardiovascular:      Rate and Rhythm: Normal rate and regular rhythm. Heart sounds: Normal heart sounds. No murmur. No friction rub. No gallop. Pulmonary:      Effort: Pulmonary effort is normal.      Breath sounds: Normal breath sounds. Abdominal:      General: Bowel sounds are normal.      Palpations: Abdomen is soft. Tenderness: There is no abdominal tenderness. Musculoskeletal: Normal range of motion. Skin:     General: Skin is warm and dry. Findings: No erythema or rash. Neurological:      Mental Status: He is alert and oriented to person, place, and time. Psychiatric:         Speech: Speech normal.         Behavior: Behavior normal.         Thought Content: Thought content normal.         Assessment/Plan:      Mayito Abbott was seen today for medicare aw. Diagnoses and all orders for this visit:    Need for prophylactic vaccination against Streptococcus pneumoniae (pneumococcus)  -     Pneumococcal polysaccharide vaccine 23-valent greater than or equal to 1yo subcutaneous/IM    Need for hepatitis C screening test  -     Hepatitis C Antibody; Future    Screening for colon cancer  -     Cologuard (For External Results Only); Future    Vitamin B12 deficiency neuropathy (HCC)  -     Vitamin B12 & Folate; Future    Current moderate episode of major depressive disorder without prior episode ([facility] [address] [facility]    Essential hypertension    Hypercholesterolemia with hypertriglyceridemia  -     Lipid Panel;  Future  -     CBC Auto Differential; Future  -     Comprehensive Metabolic Panel, Fasting; Future  -     TSH with Reflex; Future    Hypothyroidism, unspecified type  -     TSH with Reflex; Future    Routine general medical examination at a health care facility    Other orders  -     INFLUENZA, QUADV, ADJUVANTED, 65 YRS =, IM, PF, PREFILL SYR, 0.5ML (FLUAD)         Return in 6 months (on 2021) for fu chronic issues . Discussed use, benefit, andside effects of prescribed medications. Barriers to medication compliance addressed. All patient questions answered. Pt voiced understanding. Electronically signedby HEVER Cabrera CNP on 10/14/2020 at 12:15 PM      Medicare Annual Wellness Visit  Name: Kristyn Huffman Date: 10/14/2020   MRN: [id number] Sex: Male   Age: 76 y.o. Ethnicity: Non-/Non    : 1955 Race: Leo Jay is here for Medicare AWV    Screenings for behavioral, psychosocial and functional/safety risks, and cognitive dysfunction are all negative except as indicated below. These results, as well as other patient data from the [address] [facility] form, are documented in Flowsheets linked to this Encounter. Allergies   Allergen Reactions    Shellfish-Derived Products          Prior to Visit Medications    Medication Sig Taking?  Authorizing Provider   amLODIPine (NORVASC) 10 MG tablet Take 1 tablet by mouth daily Yes HEVER Covarrubias CNP   fenofibrate (TRIGLIDE) 160 MG tablet Take 1 tablet by mouth daily Yes HEVER Covarrubias CNP   atorvastatin (LIPITOR) 80 MG tablet Take 1 tablet by mouth nightly Yes HEVER Covarrubias CNP   levothyroxine (SYNTHROID) 25 MCG tablet TAKE 1 TABLET BY MOUTH DAILY WITH WATER ON AN EMPTY STOMACH, WAIT 30 MINUTES BEFORE EATING OR TAKING OTHER MEDS Yes HEVER Covarrubias CNP   omega-3 acid ethyl esters (LOVAZA) 1 g capsule Take 2 capsules by mouth 2 times daily Yes HEVER Covarrubias CNP   aspirin 81 MG tablet Take 81 mg by mouth daily Yes Historical Provider, MD         Past Medical History:   Diagnosis Date    Ankylosing spondylitis ([address]     Migraine     TIA (transient ischemic attack)        Past Surgical History:   Procedure Laterality Date    KNEE SURGERY  1983    both knees     MYRINGOTOMY AND TYMPANOSTOMY TUBE PLACEMENT Left 04/16/2013    Dr Mery Nix SINUS SURGERY           Family History   Problem Relation Age of Onset    Arthritis Mother        CareTeam (Including outside providers/suppliers regularly involved in providing care):   Patient Care Team:  HEVER Garvey CNP as PCP - General (Family Medicine)  HEVER Garvey CNP as PCP - [facility] Empaneled Provider    Wt Readings from Last 3 Encounters:   10/12/20 171 lb 12.8 oz (77.9 kg)   08/25/20 172 lb (78 kg)   07/17/20 186 lb (84.4 kg)     Vitals:    10/12/20 1309   BP: 128/72   Site: Left Upper Arm   Position: Sitting   Cuff Size: Large Adult   Pulse: 75   Temp: 97.3 °F (36.3 °C)   TempSrc: Temporal   SpO2: 98%   Weight: 171 lb 12.8 oz (77.9 kg)   Height: 5' 6.5\" (1.689 m)     Body mass index is 27.31 kg/m². Based upon direct observation of the patient, evaluation of cognition reveals recent and remote memory intact. Patient's complete Health Risk Assessment and screening values have been reviewed and are found in Flowsheets. The following problems were reviewed today and where indicated follow up appointments were made and/or referrals ordered. Positive Risk Factor Screenings with Interventions:       General Health and ACP:  General  In general, how would you say your health is?: Good  In the past 7 days, have you experienced any of the following?  New or Increased Pain, New or Increased Fatigue, Loneliness, Social Isolation, Stress or Anger?: None of These  Do you get the social and emotional support that you need?: Yes  Do you have a Living Will?: Yes  Advance Directives     Power of [facility] Will ACP-Advance Directive ACP-Power of     Not on File Not on File Nemo Visit (AWV)  05/29/2019    Lipid screen  06/22/2021    TSH testing  06/22/2021    Potassium monitoring  06/22/2021    Creatinine monitoring  06/22/2021    Flu vaccine  Completed    Pneumococcal 65+ years Vaccine  Completed    Hepatitis A vaccine  Aged Out    Hepatitis B vaccine  Aged Out    Hib vaccine  Aged Out    Meningococcal (ACWY) vaccine  Aged Out     Recommendations for [employer] Due: see orders and patient instructions/AVS.  . Recommended screening schedule for the next 5-10 years is provided to the patient in written form: see Patient Instructions/AVS.    Сергей Heard was seen today for medicare awv. Diagnoses and all orders for this visit:    Need for prophylactic vaccination against Streptococcus pneumoniae (pneumococcus)  -     Pneumococcal polysaccharide vaccine 23-valent greater than or equal to 3yo subcutaneous/IM    Need for hepatitis C screening test  -     Hepatitis C Antibody; Future    Screening for colon cancer  -     Cologuard (For External Results Only); Future    Vitamin B12 deficiency neuropathy (HCC)  -     Vitamin B12 & Folate; Future    Current moderate episode of major depressive disorder without prior episode ([address]    Essential hypertension    Hypercholesterolemia with hypertriglyceridemia  -     Lipid Panel; Future  -     CBC Auto Differential; Future  -     Comprehensive Metabolic Panel, Fasting; Future  -     TSH with Reflex; Future    Hypothyroidism, unspecified type  -     TSH with Reflex;  Future    Routine general medical examination at a health care facility    Other orders  -     INFLUENZA, QUADV, ADJUVANTED, 65 YRS =, IM, PF, PREFILL SYR, 0.5ML (FLUAD)

## 2020-10-14 ASSESSMENT — ENCOUNTER SYMPTOMS
COUGH: 0
VOMITING: 0
EYE DISCHARGE: 0
ABDOMINAL PAIN: 0
RHINORRHEA: 0
SHORTNESS OF BREATH: 0
DIARRHEA: 0
CONSTIPATION: 0
CHEST TIGHTNESS: 0

## 2020-11-10 RX ORDER — AMLODIPINE BESYLATE 10 MG/1
TABLET ORAL
Qty: 90 TABLET | Refills: 0 | Status: SHIPPED | OUTPATIENT
Start: 2020-11-10 | End: 2021-02-08

## 2021-02-07 DIAGNOSIS — I10 ESSENTIAL HYPERTENSION: ICD-10-CM

## 2021-02-08 RX ORDER — AMLODIPINE BESYLATE 10 MG/1
TABLET ORAL
Qty: 90 TABLET | Refills: 2 | Status: SHIPPED | OUTPATIENT
Start: 2021-02-08 | End: 2021-07-21 | Stop reason: SDUPTHER

## 2021-02-23 ENCOUNTER — NURSE TRIAGE (OUTPATIENT)
Dept: OTHER | Facility: CLINIC | Age: 66
End: 2021-02-23

## 2021-02-23 NOTE — TELEPHONE ENCOUNTER
Reason for Disposition   MILD pain (i.e., scale 1-3; does not interfere with normal activities) and present > 3 days    Answer Assessment - Initial Assessment Questions  1. LOCATION: \"Where does it hurt? \" (e.g., left, right)      Right    2. ONSET: \"When did the pain start? \"      4-5 days    3. SEVERITY: \"How bad is the pain? \" (e.g., Scale 1-10; mild, moderate, or severe)    - MILD (1-3): doesn't interfere with normal activities     - MODERATE (4-7): interferes with normal activities or awakens from sleep     - SEVERE (8-10): excruciating pain and patient unable to do normal activities (stays in bed)        4/10    4. PATTERN: \"Does the pain come and go, or is it constant? \"       Comes and goes     5. CAUSE: \"What do you think is causing the pain? \"      Unsure    6. OTHER SYMPTOMS:  \"Do you have any other symptoms? \" (e.g., fever, abdominal pain, vomiting, leg weakness, burning with urination, blood in urine)      Denies    7. PREGNANCY:  \"Is there any chance you are pregnant? \" \"When was your last menstrual period? \"      N/A    Protocols used: FLANK PAIN-ADULT-OH    Patient called Tonio Hirsch at [address] [facility] pre-service center [facility])  with red flag complaint. Brief description of triage: mild right sided pain x 4-5 days, unsure of history of kidney stones    Triage indicates for patient to be seen within 3 days    Care advice provided, patient verbalizes understanding; denies any other questions or concerns; instructed to call back for any new or worsening symptoms. Writer provided warm transfer to [facility] for appointment scheduling. Attention Provider: Thank you for allowing me to participate in the care of your patient. The patient was connected to triage in response to information provided to the [facility]. Please do not respond through this encounter as the response is not directed to a shared pool.

## 2021-03-01 ENCOUNTER — HOSPITAL ENCOUNTER (OUTPATIENT)
Age: 66
Setting detail: SPECIMEN
Discharge: HOME | End: 2021-03-01
Payer: MEDICARE

## 2021-03-01 ENCOUNTER — OFFICE VISIT (OUTPATIENT)
Dept: FAMILY MEDICINE CLINIC | Age: 66
End: 2021-03-01
Payer: MEDICARE

## 2021-03-01 ENCOUNTER — NURSE TRIAGE (OUTPATIENT)
Dept: OTHER | Facility: CLINIC | Age: 66
End: 2021-03-01

## 2021-03-01 VITALS
TEMPERATURE: 97.1 F | RESPIRATION RATE: 18 BRPM | OXYGEN SATURATION: 97 % | WEIGHT: 175.4 LBS | HEART RATE: 73 BPM | BODY MASS INDEX: 27.89 KG/M2 | SYSTOLIC BLOOD PRESSURE: 156 MMHG | DIASTOLIC BLOOD PRESSURE: 75 MMHG

## 2021-03-01 DIAGNOSIS — R10.31 RIGHT LOWER QUADRANT PAIN: Primary | ICD-10-CM

## 2021-03-01 DIAGNOSIS — R31.1 BENIGN ESSENTIAL MICROSCOPIC HEMATURIA: ICD-10-CM

## 2021-03-01 DIAGNOSIS — R03.0 ELEVATED BLOOD PRESSURE READING: ICD-10-CM

## 2021-03-01 LAB
BILIRUBIN, POC: NEGATIVE
BLOOD URINE, POC: ABNORMAL
CLARITY, POC: CLEAR
COLOR, POC: YELLOW
GLUCOSE URINE, POC: NEGATIVE
KETONES, POC: NEGATIVE
LEUKOCYTE EST, POC: NEGATIVE
NITRITE, POC: NEGATIVE
PH, POC: 7
PROTEIN, POC: NEGATIVE
SPECIFIC GRAVITY, POC: 1.02
UROBILINOGEN, POC: ABNORMAL

## 2021-03-01 PROCEDURE — 99214 OFFICE O/P EST MOD 30 MIN: CPT | Performed by: NURSE PRACTITIONER

## 2021-03-01 PROCEDURE — G8427 DOCREV CUR MEDS BY ELIG CLIN: HCPCS | Performed by: NURSE PRACTITIONER

## 2021-03-01 PROCEDURE — G8417 CALC BMI ABV UP PARAM F/U: HCPCS | Performed by: NURSE PRACTITIONER

## 2021-03-01 PROCEDURE — 3017F COLORECTAL CA SCREEN DOC REV: CPT | Performed by: NURSE PRACTITIONER

## 2021-03-01 PROCEDURE — 1036F TOBACCO NON-USER: CPT | Performed by: NURSE PRACTITIONER

## 2021-03-01 PROCEDURE — 81003 URINALYSIS AUTO W/O SCOPE: CPT | Performed by: NURSE PRACTITIONER

## 2021-03-01 PROCEDURE — 4040F PNEUMOC VAC/ADMIN/RCVD: CPT | Performed by: NURSE PRACTITIONER

## 2021-03-01 PROCEDURE — 1123F ACP DISCUSS/DSCN MKR DOCD: CPT | Performed by: NURSE PRACTITIONER

## 2021-03-01 PROCEDURE — G8484 FLU IMMUNIZE NO ADMIN: HCPCS | Performed by: NURSE PRACTITIONER

## 2021-03-01 ASSESSMENT — ENCOUNTER SYMPTOMS
SHORTNESS OF BREATH: 0
CHEST TIGHTNESS: 0
EYE DISCHARGE: 0
CONSTIPATION: 1
COUGH: 0
RHINORRHEA: 0
VOMITING: 0
DIARRHEA: 0
ABDOMINAL PAIN: 0

## 2021-03-01 NOTE — PROGRESS NOTES
[address] [facility],[address] [facility]. [place] [address] [facility]  Dept: [phone]  Dept Fax: : [phone]  [address] [place] [address] Fax: [phone]     Visit type: Established patient    Reason for Visit: Abdominal Pain (RLQ pain x 3-4 days)      Assessment and Plan       1. Right lower quadrant pain   Kidney stone, versus constipation, versus muscle   -     CBC Auto Differential; Future  -     POCT Urinalysis No Micro (Auto)  -     XR ABDOMEN (KUB) (SINGLE AP VIEW); Future  -     Comprehensive Metabolic Panel; Future  -     Culture, Urine  2. Benign essential microscopic hematuria  -     Culture, Urine  3. Elevated blood pressure reading   Likely related to pain   Will send in flomax if kidney stone, miralax if constipation  Ct if pain persists and normal KUB   Return if symptoms worsen or fail to improve. Subjective       RLQ pain  Onset 2/20/21  States that it twinges  Nothing hurts when pushing it  States that he had this before and thought it was the kidney stones   No diarrhea maybe some constipation if he eats pasts  Pain level now is a 2  Nothing seems to make it worse or better  No urinary symptoms            Review of Systems   Constitutional: Negative for activity change, appetite change and fever. HENT: Negative for congestion, ear pain and rhinorrhea. Eyes: Negative for discharge and visual disturbance. Respiratory: Negative for cough, chest tightness and shortness of breath. Cardiovascular: Negative for chest pain and palpitations. Gastrointestinal: Positive for constipation. Negative for abdominal pain, diarrhea and vomiting. Genitourinary: Positive for flank pain. Negative for difficulty urinating and hematuria. Musculoskeletal: Negative for arthralgias and myalgias. Skin: Negative for rash. Neurological: Negative for dizziness, weakness, numbness and headaches. Psychiatric/Behavioral: The patient is not nervous/anxious. Allergies   Allergen Reactions    Shellfish-Derived Products        Outpatient Medications Prior to Visit   Medication Sig Dispense Refill    amLODIPine (NORVASC) 10 MG tablet Take 1 tablet by mouth once daily 90 tablet 2    fenofibrate (TRIGLIDE) 160 MG tablet Take 1 tablet by mouth daily 90 tablet 3    atorvastatin (LIPITOR) 80 MG tablet Take 1 tablet by mouth nightly 90 tablet 3    levothyroxine (SYNTHROID) 25 MCG tablet TAKE 1 TABLET BY MOUTH DAILY WITH WATER ON AN EMPTY STOMACH, WAIT 30 MINUTES BEFORE EATING OR TAKING OTHER MEDS 90 tablet 3    omega-3 acid ethyl esters (LOVAZA) 1 g capsule Take 2 capsules by mouth 2 times daily 60 capsule 3    aspirin 81 MG tablet Take 81 mg by mouth daily       No facility-administered medications prior to visit. Past Medical History:   Diagnosis Date    [address]ylosing sp[address]ylitis ([facility]     Migraine     TIA (transient ischemic attack)         Social History     Tobacco Use    Smoking status: Never Smoker    Smokeless tobacco: Never Used   Substance Use Topics    Alcohol use: Yes     Comment: social        Past Surgical History:   Procedure Laterality Date    KNEE SURGERY  1983    both knees     MYRINGOTOMY AND TYMPANOSTOMY TUBE PLACEMENT Left 04/16/2013    Dr Bernarda Garvey SINUS SURGERY         Family History   Problem Relation Age of Onset    Arthritis Mother        Objective       BP (!) 156/75 (Site: Right Upper Arm, Position: Sitting, Cuff Size: Large Adult)   Pulse 73   Temp 97.1 °F (36.2 °C) (Temporal)   Resp 18   Wt 175 lb 6.4 oz (79.6 kg)   SpO2 97%   BMI 27.89 kg/m²   Physical Exam  Vitals signs reviewed. Constitutional:       Appearance: He is well-developed. HENT:      Head: Normocephalic and atraumatic. Right Ear: External ear normal.      Left Ear: External ear normal.   Eyes:      Conjunctiva/sclera: Conjunctivae normal.   Neck:      Musculoskeletal: Normal range of motion and neck supple. No spinous process tenderness. Thyroid: No thyromegaly. Trachea: Trachea normal.   Cardiovascular:      Rate and Rhythm: Normal rate and regular rhythm. Heart sounds: Normal heart sounds. No murmur. No friction rub. No gallop. Pulmonary:      Effort: Pulmonary effort is normal.      Breath sounds: Normal breath sounds. Abdominal:      General: Bowel sounds are normal.      Palpations: Abdomen is soft. Tenderness: There is no abdominal tenderness. There is no right CVA tenderness, left CVA tenderness, guarding or rebound. Negative signs include Jefferson's sign, Rovsing's sign, McBurney's sign, psoas sign and obturator sign. Musculoskeletal: Normal range of motion. Skin:     General: Skin is warm and dry. Findings: No erythema or rash. Neurological:      Mental Status: He is alert and oriented to person, place, and time. Psychiatric:         Speech: Speech normal.         Behavior: Behavior normal.         Thought Content:  Thought content normal.           Data Reviewed and Summarized       Labs:     Imaging/Testing:    POCT Urinalysis No Micro (Auto)  Order: [id number]  Status:  Final result   Visible to patient:  Yes (MyChart) Dx:  Right lower quadrant pain  Component 16:03   Color, UA Yellow    Clarity, UA Clear    Glucose, UA POC Negative    Bilirubin, UA Negative    Ketones, UA Negative    Spec Grav, UA 1.025    Blood, UA POC Trace-IntactHigh     pH, UA 7.0    Protein, UA POC Negative    Urobilinogen, UA 0.2 E.U./dL    Leukocytes, UA Negative    Nitrite, UA Negative                Sofya Arreguin APRN - CNP

## 2021-03-01 NOTE — PATIENT INSTRUCTIONS
Patient Education        Abdominal Pain: Care Instructions  Your Care Instructions     Abdominal pain has many possible causes. Some aren't serious and get better on their own in a few days. Others need more testing and treatment. If your pain continues or gets worse, you need to be rechecked and may need more tests to find out what is wrong. You may need surgery to correct the problem. Don't ignore new symptoms, such as fever, nausea and vomiting, urination problems, pain that gets worse, and dizziness. These may be signs of a more serious problem. Your doctor may have recommended a follow-up visit in the next 8 to 12 hours. If you are not getting better, you may need more tests or treatment. The doctor has checked you carefully, but problems can develop later. If you notice any problems or new symptoms, get medical treatment right away. Follow-up care is a key part of your treatment and safety. Be sure to make and go to all appointments, and call your doctor if you are having problems. It's also a good idea to know your test results and keep a list of the medicines you take. How can you care for yourself at home? · Rest until you feel better. · To prevent dehydration, drink plenty of fluids, enough so that your urine is light yellow or clear like water. Choose water and other caffeine-free clear liquids until you feel better. If you have kidney, heart, or liver disease and have to limit fluids, talk with your doctor before you increase the amount of fluids you drink. · If your stomach is upset, eat mild foods, such as rice, dry toast or crackers, bananas, and applesauce. Try eating several small meals instead of two or three large ones. · Wait until 48 hours after all symptoms have gone away before you have spicy foods, alcohol, and drinks that contain caffeine. · Do not eat foods that are high in fat. · Avoid anti-inflammatory medicines such as aspirin, ibuprofen (Advil, Motrin), and naproxen (Aleve). These can cause stomach upset. Talk to your doctor if you take daily aspirin for another health problem. When should you call for help? Call 911 anytime you think you may need emergency care. For example, call if:    · You passed out (lost consciousness).     · You pass maroon or very bloody stools.     · You vomit blood or what looks like coffee grounds.     · You have new, severe belly pain. Call your doctor now or seek immediate medical care if:    · Your pain gets worse, especially if it becomes focused in one area of your belly.     · You have a new or higher fever.     · Your stools are black and look like tar, or they have streaks of blood.     · You have unexpected vaginal bleeding.     · You have symptoms of a urinary tract infection. These may include:  ? Pain when you urinate. ? Urinating more often than usual.  ? Blood in your urine.     · You are dizzy or lightheaded, or you feel like you may faint. Watch closely for changes in your health, and be sure to contact your doctor if:    · You are not getting better after 1 day (24 hours). Where can you learn more? Go to [email or web address]!. org and sign in to your [employer] account. Enter G279 in the [employer] box to learn more about \"Abdominal Pain: Care Instructions. \"     If you do not have an account, please click on the \"Sign Up Now\" link. Current as of: June 26, 2019               Content Version: 12.6  © 9156-8810 [employer]. Care instructions adapted under license by [facility] ([facility]). If you have questions about a medical condition or this instruction, always ask your healthcare professional. [place] [address] any warranty or liability for your use of this information.

## 2021-03-01 NOTE — TELEPHONE ENCOUNTER
Reason for Disposition   MILD pain that comes and goes (cramps) lasts > 24 hours    Answer Assessment - Initial Assessment Questions  1. LOCATION: \"Where does it hurt? \"       Right lower abdomen    2. RADIATION: \"Does the pain shoot anywhere else? \" (e.g., chest, back)      No    3. ONSET: \"When did the pain begin? \" (Minutes, hours or days ago)       A week ago very painful, subsides with walking    4. SUDDEN: \"Gradual or sudden onset? \"      Gradually    5. PATTERN \"Does the pain come and go, or is it constant? \"     - If constant: \"Is it getting better, staying the same, or worsening? \"       (Note: Constant means the pain never goes away completely; most serious pain is constant and it progresses)      - If intermittent: \"How long does it last?\" \"Do you have pain now? \"      (Note: Intermittent means the pain goes away completely between bouts)      Intermittent    6. SEVERITY: \"How bad is the pain? \"  (e.g., Scale 1-10; mild, moderate, or severe)     - MILD (1-3): doesn't interfere with normal activities, abdomen soft and not tender to touch      - MODERATE (4-7): interferes with normal activities or awakens from sleep, tender to touch      - SEVERE (8-10): excruciating pain, doubled over, unable to do any normal activities        Dull, intermittent sharp pain, 2/10    7. RECURRENT SYMPTOM: \"Have you ever had this type of abdominal pain before? \" If so, ask: \"When was the last time? \" and \"What happened that time? \"       No    8. CAUSE: \"What do you think is causing the abdominal pain? \"      Maybe appendicitis    9. RELIEVING/AGGRAVATING FACTORS: \"What makes it better or worse? \" (e.g., movement, antacids, bowel movement)      Ibuprofen    10. OTHER SYMPTOMS: \"Has there been any vomiting, diarrhea, constipation, or urine problems? \"        No    Protocols used: ABDOMINAL PAIN - MALE-ADULT-[place]    Patient called Fin[facility] at [facility] pre-service [facility])  with red flag complaint.      Brief description of triage: Abdominal pain for 1 week. Triage indicates for patient to be seen today in office. Care advice provided, patient verbalizes understanding; denies any other questions or concerns; instructed to call back for any new or worsening symptoms. Writer provided warm transfer to [place] at [facility] [place] for appointment scheduling. Attention Provider: Thank you for allowing me to participate in the care of your patient. The patient was connected to triage in response to information provided to the ECC. Please do not respond through this encounter as the response is not directed to a shared pool.

## 2021-03-02 DIAGNOSIS — E78.2 HYPERCHOLESTEROLEMIA WITH HYPERTRIGLYCERIDEMIA: ICD-10-CM

## 2021-03-02 DIAGNOSIS — R10.31 RIGHT LOWER QUADRANT PAIN: ICD-10-CM

## 2021-03-02 LAB
ABSOLUTE EOS #: 0.2 K/UL (ref 0–0.44)
ABSOLUTE IMMATURE GRANULOCYTE: 0.06 K/UL (ref 0–0.3)
ABSOLUTE LYMPH #: 2.5 K/UL (ref 1.1–3.7)
ABSOLUTE MONO #: 1.35 K/UL (ref 0.1–1.2)
ALBUMIN SERPL-MCNC: 4.3 G/DL (ref 3.5–5.2)
ALBUMIN/GLOBULIN RATIO: 1.4 (ref 1–2.5)
ALP BLD-CCNC: 54 U/L (ref 40–129)
ALT SERPL-CCNC: 16 U/L (ref 5–41)
ANION GAP SERPL CALCULATED.3IONS-SCNC: 12 MMOL/L (ref 9–17)
AST SERPL-CCNC: 23 U/L
BASOPHILS # BLD: 0 % (ref 0–2)
BASOPHILS ABSOLUTE: 0.05 K/UL (ref 0–0.2)
BILIRUB SERPL-MCNC: 0.43 MG/DL (ref 0.3–1.2)
BUN BLDV-MCNC: 15 MG/DL (ref 8–23)
BUN/CREAT BLD: NORMAL (ref 9–20)
CALCIUM SERPL-MCNC: 9.2 MG/DL (ref 8.6–10.4)
CHLORIDE BLD-SCNC: 105 MMOL/L (ref 98–107)
CO2: 21 MMOL/L (ref 20–31)
CREAT SERPL-MCNC: 1.15 MG/DL (ref 0.7–1.2)
DIFFERENTIAL TYPE: ABNORMAL
EOSINOPHILS RELATIVE PERCENT: 2 % (ref 1–4)
GFR AFRICAN AMERICAN: >60 ML/MIN
GFR NON-AFRICAN AMERICAN: >60 ML/MIN
GFR SERPL CREATININE-BSD FRML MDRD: NORMAL ML/MIN/{1.73_M2}
GFR SERPL CREATININE-BSD FRML MDRD: NORMAL ML/MIN/{1.73_M2}
GLUCOSE FASTING: 73 MG/DL (ref 70–99)
HCT VFR BLD CALC: 46.8 % (ref 40.7–50.3)
HEMOGLOBIN: 15.2 G/DL (ref 13–17)
IMMATURE GRANULOCYTES: 1 %
LYMPHOCYTES # BLD: 21 % (ref 24–43)
MCH RBC QN AUTO: 28 PG (ref 25.2–33.5)
MCHC RBC AUTO-ENTMCNC: 32.5 G/DL (ref 28.4–34.8)
MCV RBC AUTO: 86.3 FL (ref 82.6–102.9)
MONOCYTES # BLD: 11 % (ref 3–12)
NRBC AUTOMATED: 0 PER 100 WBC
PDW BLD-RTO: 12.8 % (ref 11.8–14.4)
PLATELET # BLD: 301 K/UL (ref 138–453)
PLATELET ESTIMATE: ABNORMAL
PMV BLD AUTO: 12.1 FL (ref 8.1–13.5)
POTASSIUM SERPL-SCNC: 4 MMOL/L (ref 3.7–5.3)
RBC # BLD: 5.42 M/UL (ref 4.21–5.77)
RBC # BLD: ABNORMAL 10*6/UL
SEG NEUTROPHILS: 65 % (ref 36–65)
SEGMENTED NEUTROPHILS ABSOLUTE COUNT: 8.01 K/UL (ref 1.5–8.1)
SODIUM BLD-SCNC: 138 MMOL/L (ref 135–144)
TOTAL PROTEIN: 7.4 G/DL (ref 6.4–8.3)
WBC # BLD: 12.2 K/UL (ref 3.5–11.3)
WBC # BLD: ABNORMAL 10*3/UL

## 2021-03-03 LAB
CULTURE: NORMAL
Lab: NORMAL
SPECIMEN DESCRIPTION: NORMAL

## 2021-04-19 ENCOUNTER — TELEPHONE (OUTPATIENT)
Dept: ENT CLINIC | Age: 66
End: 2021-04-19

## 2021-04-19 NOTE — TELEPHONE ENCOUNTER
Eulalia Curling called [facility] to schedule an appointment with Dr. Alexandro Izaguirre. Eulalia Curling c/o left ear problems, left ear plugs up once every year. Eulalia Curling was seen last 7/17/20 by Dr. Alexandro Izaguirre for an ear tube placement.  Eulalia Curling no showed the appointment 8/18/20 with LIZANDRO Fitzgerald.

## 2021-04-19 NOTE — TELEPHONE ENCOUNTER
Spoke with patient and informed him that is is very likely the patient's tube fell out and we recommend keeping the follow-up appointment as scheduled with Dr. Andressa Crooks so he can possibly place the tube in the office that day. Patient was informed he should go to the ER sooner if he develops fevers, worsening hearing, worsening ear pain, or other concerns. Patient verbalized understanding and verified appointment with Dr. Andressa Crooks.

## 2021-04-19 NOTE — TELEPHONE ENCOUNTER
Very likely the patient's tube fell out. I recommend keeping the follow-up appointment as scheduled with Dr. Valarie Sampson so he can possibly place the tube in the office that day. Patient should go to the ER sooner if he develops fevers, worsening hearing, worsening ear pain, or other concerns.

## 2021-04-22 ENCOUNTER — OFFICE VISIT (OUTPATIENT)
Dept: FAMILY MEDICINE CLINIC | Age: 66
End: 2021-04-22
Payer: MEDICARE

## 2021-04-22 VITALS
SYSTOLIC BLOOD PRESSURE: 135 MMHG | BODY MASS INDEX: 28.17 KG/M2 | HEART RATE: 66 BPM | OXYGEN SATURATION: 98 % | WEIGHT: 177.2 LBS | DIASTOLIC BLOOD PRESSURE: 69 MMHG | TEMPERATURE: 97.3 F

## 2021-04-22 DIAGNOSIS — E53.8 VITAMIN B12 DEFICIENCY NEUROPATHY (HCC): ICD-10-CM

## 2021-04-22 DIAGNOSIS — E03.9 HYPOTHYROIDISM, UNSPECIFIED TYPE: ICD-10-CM

## 2021-04-22 DIAGNOSIS — G63 VITAMIN B12 DEFICIENCY NEUROPATHY (HCC): ICD-10-CM

## 2021-04-22 DIAGNOSIS — M45.6 ANKYLOSING SPONDYLITIS OF LUMBAR REGION (HCC): ICD-10-CM

## 2021-04-22 DIAGNOSIS — R10.11 RUQ PAIN: Primary | ICD-10-CM

## 2021-04-22 DIAGNOSIS — Z12.11 SCREENING FOR COLON CANCER: ICD-10-CM

## 2021-04-22 DIAGNOSIS — I10 ESSENTIAL HYPERTENSION: ICD-10-CM

## 2021-04-22 DIAGNOSIS — F32.1 CURRENT MODERATE EPISODE OF MAJOR DEPRESSIVE DISORDER WITHOUT PRIOR EPISODE (HCC): ICD-10-CM

## 2021-04-22 PROCEDURE — G8427 DOCREV CUR MEDS BY ELIG CLIN: HCPCS | Performed by: NURSE PRACTITIONER

## 2021-04-22 PROCEDURE — 4040F PNEUMOC VAC/ADMIN/RCVD: CPT | Performed by: NURSE PRACTITIONER

## 2021-04-22 PROCEDURE — 99214 OFFICE O/P EST MOD 30 MIN: CPT | Performed by: NURSE PRACTITIONER

## 2021-04-22 PROCEDURE — G8417 CALC BMI ABV UP PARAM F/U: HCPCS | Performed by: NURSE PRACTITIONER

## 2021-04-22 PROCEDURE — 3017F COLORECTAL CA SCREEN DOC REV: CPT | Performed by: NURSE PRACTITIONER

## 2021-04-22 PROCEDURE — 1123F ACP DISCUSS/DSCN MKR DOCD: CPT | Performed by: NURSE PRACTITIONER

## 2021-04-22 PROCEDURE — 1036F TOBACCO NON-USER: CPT | Performed by: NURSE PRACTITIONER

## 2021-04-22 ASSESSMENT — PATIENT HEALTH QUESTIONNAIRE - PHQ9
1. LITTLE INTEREST OR PLEASURE IN DOING THINGS: 0
SUM OF ALL RESPONSES TO PHQ QUESTIONS 1-9: 0
2. FEELING DOWN, DEPRESSED OR HOPELESS: 0
SUM OF ALL RESPONSES TO PHQ QUESTIONS 1-9: 0
SUM OF ALL RESPONSES TO PHQ QUESTIONS 1-9: 0

## 2021-04-22 ASSESSMENT — ENCOUNTER SYMPTOMS
VOICE CHANGE: 0
NAUSEA: 0
ABDOMINAL PAIN: 1
SINUS PRESSURE: 0
RHINORRHEA: 0
VOMITING: 0
COUGH: 0
TROUBLE SWALLOWING: 0
EYE DISCHARGE: 0
WHEEZING: 0
SORE THROAT: 0
SINUS PAIN: 0

## 2021-04-22 NOTE — PROGRESS NOTES
[address] [facility],[address] FAMILY MEDICINE, [facility]. [facility] [address]  Dept: [phone]  Dept Fax: : [phone]  [address] [place] [address] Fax: [phone]     Visit type: Established patient    Reason for Visit: Health Maintenance (cologuard / tdap / shingrix ) and Follow-up (6 month )      Assessment and Plan       1. RUQ pain   Need to work up for gall bladder   -     NM HEPATOBILIARY SCAN W PHARMACOLOGICAL INTERVENTION; Future  -     US GALLBLADDER RUQ; Future  2. Vitamin B12 deficiency neuropathy ([facility]  3. Ankylosing spondylitis of lumbar region ([facility]   Stable   4. Current moderate episode of major depressive disorder without prior episode ([facility]   resolved  5. Screening for colon cancer   Decided he would do colonoscopy instead of cologuard   -     [place] - Jose G Arguello MD, Gastroenterology, [facility]  6. Essential hypertension   stable  7. Hypothyroidism, unspecified type   Stable, labs prior to next apt     Return in about 3 months (around 7/22/2021) for get labs before, RUQ and chronic issues. Subjective       Hyperlipidemia. Onset years ago. Is taking statin and fenofibrate    Taking fish oil   Tolerating the high dose without side effects. The 10-year ASCVD risk score (Omar Lopez., et al., 2013) is: 16.9%    Values used to calculate the score:      Age: 71 years      Sex: Male      Is Non- : No      Diabetic: No      Tobacco smoker: No      Systolic Blood Pressure: 851 mmHg      Is BP treated: Yes      HDL Cholesterol: 36 mg/dL      Total Cholesterol: 222 mg/dL    Weight  States he can feel he has gained weight   Has not changed diet or exercise     HTN  Onset years ago  Taking norvasc. CP Or palpitations> no   Monitor BP at home 130/70  Hx of TIA involving carotid artery. Is here for fu of cardiac testing     Hypothyroidism  Onset years ago  Taking supplement  No skin hair or nail changes  Heat or cold intolerance?  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 HENT: Negative for congestion, ear pain, postnasal drip, rhinorrhea, sinus pressure, sinus pain, sneezing, sore throat, tinnitus, trouble swallowing and voice change. Eyes: Negative for discharge and visual disturbance (wears glasses). Dry eyes   Respiratory: Negative for cough and wheezing. Cardiovascular: Negative for chest pain (resolved) and palpitations. Gastrointestinal: Positive for abdominal pain. Negative for nausea and vomiting. Genitourinary: Negative for decreased urine volume and flank pain. Musculoskeletal: Negative for arthralgias, joint swelling, myalgias and neck pain. Skin: Negative for rash. Neurological: Negative for weakness, numbness and headaches. Psychiatric/Behavioral: Negative for agitation, behavioral problems, confusion, decreased concentration, self-injury, sleep disturbance and suicidal ideas. The patient is not nervous/anxious.          Allergies   Allergen Reactions    Shellfish-Derived Products        Outpatient Medications Prior to Visit   Medication Sig Dispense Refill    amLODIPine (NORVASC) 10 MG tablet Take 1 tablet by mouth once daily 90 tablet 2    fenofibrate (TRIGLIDE) 160 MG tablet Take 1 tablet by mouth daily 90 tablet 3    atorvastatin (LIPITOR) 80 MG tablet Take 1 tablet by mouth nightly 90 tablet 3    levothyroxine (SYNTHROID) 25 MCG tablet TAKE 1 TABLET BY MOUTH DAILY WITH WATER ON AN EMPTY STOMACH, WAIT 30 MINUTES BEFORE EATING OR TAKING OTHER MEDS 90 tablet 3    omega-3 acid ethyl esters (LOVAZA) 1 g capsule Take 2 capsules by mouth 2 times daily 60 capsule 3    aspirin 81 MG tablet Take 81 mg by mouth daily       No facility-administered medications prior to visit. Past Medical History:   Diagnosis Date    Ankylosing spondylitis ([address]     Migraine     TIA (transient ischemic attack)         Social History     Tobacco Use    Smoking status: Never Smoker    Smokeless tobacco: Never Used   Substance Use Topics    Alcohol use: Yes     Comment: social        Past Surgical History:   Procedure Laterality Date    KNEE SURGERY  1983    both knees     MYRINGOTOMY AND TYMPANOSTOMY TUBE PLACEMENT Left 04/16/2013    Dr Mullins Tensed SINUS SURGERY         Family History   Problem Relation Age of Onset    Arthritis Mother        Objective       /69   Pulse 66   Temp 97.3 °F (36.3 °C) (Temporal)   Wt 177 lb 3.2 oz (80.4 kg)   SpO2 98%   BMI 28.17 kg/m²   Physical Exam  Vitals signs reviewed. Constitutional:       Appearance: He is well-developed. HENT:      Head: Normocephalic and atraumatic. Right Ear: External ear normal.      Left Ear: External ear normal.   Eyes:      Conjunctiva/sclera: Conjunctivae normal.   Neck:      Musculoskeletal: Normal range of motion and neck supple. No spinous process tenderness. Thyroid: No thyromegaly. Trachea: Trachea normal.   Cardiovascular:      Rate and Rhythm: Normal rate and regular rhythm. Heart sounds: Normal heart sounds. No murmur. No friction rub. No gallop. Pulmonary:      Effort: Pulmonary effort is normal.      Breath sounds: Normal breath sounds. Abdominal:      General: Bowel sounds are normal.      Palpations: Abdomen is soft. Tenderness: There is abdominal tenderness (mild) in the right upper quadrant. Musculoskeletal: Normal range of motion. Skin:     General: Skin is warm and dry. Findings: No erythema or rash.    Neurological:      Mental Status: He is alert and oriented to

## 2021-04-28 PROBLEM — F32.1 CURRENT MODERATE EPISODE OF MAJOR DEPRESSIVE DISORDER WITHOUT PRIOR EPISODE (HCC): Status: RESOLVED | Noted: 2019-09-19 | Resolved: 2021-04-28

## 2021-05-04 ENCOUNTER — HOSPITAL ENCOUNTER (OUTPATIENT)
Dept: NUCLEAR MEDICINE | Age: 66
Discharge: HOME | End: 2021-05-04
Payer: MEDICARE

## 2021-05-04 ENCOUNTER — HOSPITAL ENCOUNTER (OUTPATIENT)
Dept: ULTRASOUND IMAGING | Age: 66
Discharge: HOME | End: 2021-05-04
Payer: MEDICARE

## 2021-05-04 DIAGNOSIS — R10.11 RUQ PAIN: ICD-10-CM

## 2021-05-04 PROCEDURE — 3430000000 HC RX DIAGNOSTIC RADIOPHARMACEUTICAL: Performed by: NURSE PRACTITIONER

## 2021-05-04 PROCEDURE — A9537 TC99M MEBROFENIN: HCPCS | Performed by: NURSE PRACTITIONER

## 2021-05-04 PROCEDURE — 76705 ECHO EXAM OF ABDOMEN: CPT

## 2021-05-04 PROCEDURE — 78226 HEPATOBILIARY SYSTEM IMAGING: CPT

## 2021-05-04 RX ADMIN — Medication 8.7 MILLICURIE: at 12:55

## 2021-05-07 ENCOUNTER — OFFICE VISIT (OUTPATIENT)
Dept: ENT CLINIC | Age: 66
End: 2021-05-07
Payer: MEDICARE

## 2021-05-07 VITALS
HEIGHT: 70 IN | RESPIRATION RATE: 14 BRPM | BODY MASS INDEX: 24.91 KG/M2 | DIASTOLIC BLOOD PRESSURE: 70 MMHG | SYSTOLIC BLOOD PRESSURE: 128 MMHG | TEMPERATURE: 97.3 F | WEIGHT: 174 LBS | HEART RATE: 80 BPM

## 2021-05-07 DIAGNOSIS — H90.A12 CONDUCTIVE HEARING LOSS OF LEFT EAR WITH RESTRICTED HEARING OF RIGHT EAR: ICD-10-CM

## 2021-05-07 DIAGNOSIS — Z45.89 TYMPANOSTOMY TUBE CHECK: ICD-10-CM

## 2021-05-07 DIAGNOSIS — T85.698A EXTRUSION OF LEFT TYMPANIC VENTILATION TUBE, INITIAL ENCOUNTER: ICD-10-CM

## 2021-05-07 DIAGNOSIS — E03.9 HYPOTHYROIDISM, UNSPECIFIED TYPE: ICD-10-CM

## 2021-05-07 DIAGNOSIS — H65.92 RECURRENT SEROUS OTITIS MEDIA, LEFT: Primary | ICD-10-CM

## 2021-05-07 DIAGNOSIS — Z97.4 WEARS HEARING AID IN BOTH EARS: ICD-10-CM

## 2021-05-07 DIAGNOSIS — H65.22 CHRONIC SEROUS OTITIS MEDIA, LEFT EAR: ICD-10-CM

## 2021-05-07 PROCEDURE — G8420 CALC BMI NORM PARAMETERS: HCPCS | Performed by: OTOLARYNGOLOGY

## 2021-05-07 PROCEDURE — 3017F COLORECTAL CA SCREEN DOC REV: CPT | Performed by: OTOLARYNGOLOGY

## 2021-05-07 PROCEDURE — 4040F PNEUMOC VAC/ADMIN/RCVD: CPT | Performed by: OTOLARYNGOLOGY

## 2021-05-07 PROCEDURE — 1036F TOBACCO NON-USER: CPT | Performed by: OTOLARYNGOLOGY

## 2021-05-07 PROCEDURE — 1123F ACP DISCUSS/DSCN MKR DOCD: CPT | Performed by: OTOLARYNGOLOGY

## 2021-05-07 PROCEDURE — 69433 CREATE EARDRUM OPENING: CPT | Performed by: OTOLARYNGOLOGY

## 2021-05-07 PROCEDURE — G8427 DOCREV CUR MEDS BY ELIG CLIN: HCPCS | Performed by: OTOLARYNGOLOGY

## 2021-05-07 ASSESSMENT — ENCOUNTER SYMPTOMS
TROUBLE SWALLOWING: 0
DIARRHEA: 0
SORE THROAT: 0
NAUSEA: 0
SINUS PRESSURE: 0
COUGH: 0
VOMITING: 0
COLOR CHANGE: 0
WHEEZING: 0
RHINORRHEA: 0
SHORTNESS OF BREATH: 0
STRIDOR: 0
ABDOMINAL PAIN: 0
VOICE CHANGE: 0
CHEST TIGHTNESS: 0
CHOKING: 0
APNEA: 0
FACIAL SWELLING: 0

## 2021-05-07 NOTE — PROGRESS NOTES
Family History   Problem Relation Age of Onset    Arthritis Mother      Social History     Tobacco Use    Smoking status: Never Smoker    Smokeless tobacco: Never Used   Substance Use Topics    Alcohol use: Yes     Comment: social       Subjective:       Review of Systems   Constitutional: Negative for activity change, appetite change, chills, diaphoresis, fatigue, fever and unexpected weight change. HENT: Negative for congestion, dental problem, ear discharge, ear pain, facial swelling, hearing loss, mouth sores, nosebleeds, postnasal drip, rhinorrhea, sinus pressure, sneezing, sore throat, tinnitus, trouble swallowing and voice change. Eyes: Negative for visual disturbance. Respiratory: Negative for apnea, cough, choking, chest tightness, shortness of breath, wheezing and stridor. Cardiovascular: Negative for chest pain, palpitations and leg swelling. Gastrointestinal: Negative for abdominal pain, diarrhea, nausea and vomiting. Endocrine: Negative for cold intolerance, heat intolerance, polydipsia and polyuria. Genitourinary: Negative for dysuria, enuresis and hematuria. Musculoskeletal: Negative for arthralgias, gait problem, neck pain and neck stiffness. Skin: Negative for color change and rash. Allergic/Immunologic: Negative for environmental allergies, food allergies and immunocompromised state. Neurological: Negative for dizziness, syncope, facial asymmetry, speech difficulty, light-headedness and headaches. Hematological: Negative for adenopathy. Does not bruise/bleed easily. Psychiatric/Behavioral: Negative for confusion and sleep disturbance. The patient is not nervous/anxious.         Objective:     /70 (Site: Left Upper Arm, Position: Sitting)   Pulse 80   Temp 97.3 °F (36.3 °C) (Infrared)   Resp 14   Ht 5' 10\" (1.778 m)   Wt 174 lb (78.9 kg)   BMI 24.97 kg/m²     Physical Exam   Ears: Right TM intact  Left: Tympanic membrane dull and retracted with middle ear effusion. Tube is in the ear canal.  Tympanic membrane has sealed      PROCEDURE: MYRINGOTOMY AND TYMPANOSTOMY TUBE PLACEMENT, UNILATERAL    An appropriate informed consent was obtained, including benefits and risks, answering all of the patient's questions, and explaining other options and that there were no guarantees. The patient requested that we proceed. After cleaning the LEFT ear canal, topical anesthesia of the posterior-inferior quadrant of the tympanic membrane was obtained with Phenol. After this had taken effect, a radial incision was created in the posteroinferior quadrant of the tympanic membrane, and the middle ear examined with the following findings: Serous fluid. An Agosto ventilation tube was placed without difficulty. Left ear:     Data:  All of the past medical history, past surgical history, family history,social history, allergies and current medications were reviewed with the patient. Assessment & Plan   Diagnoses and all orders for this visit:     Diagnosis Orders   1. Recurrent serous otitis media, left  SC CREATE EARDRUM OPENING,LOCAL ANESTH   2. Extrusion of left tympanic ventilation tube, initial encounter  SC CREATE EARDRUM OPENING,LOCAL ANESTH   3. Conductive hearing loss of left ear with restricted hearing of right ear     4. Wears hearing aid in both ears     5. Tympanostomy tube check     6. Chronic serous otitis media, left ear         The findings were explained and his questions were answered. We discussed the options. Trial of antibiotics versus tube placement. Patient chose the tube placement. He knows to keep water out of the left ear. .  Call for drainage. Follow up in 6 months    6 month follow up        Kasia MAHMOOD CMA ([facility]), am scribing for, and in the presence of Dr. Marylu Castillo. Electronically signed by Hector Fong CMA ([facility]) on 5/7/21 at 8:47 AM EDT.      (Please note that portions of this note were completed with a voice recognition program. Efforts were made to edit the dictations butoccasionally words are mis-transcribed.)    I agree to the above documentation placed by my scribe. I have personally evaluated this patient. Additional findings are as noted. I reviewed the scribe's note and agree with the documented findings and plan of care. Any areas of disagreement are corrected. I agree with the chief complaint, past medical history, past surgical history, allergies, medications, social and family history as documented unless otherwise noted below.      Electronically signed by Darrius Paredes MD on 5/31/2021 at 12:25 PM

## 2021-05-10 ENCOUNTER — TELEPHONE (OUTPATIENT)
Dept: FAMILY MEDICINE CLINIC | Age: 66
End: 2021-05-10

## 2021-05-10 RX ORDER — LEVOTHYROXINE SODIUM 0.03 MG/1
TABLET ORAL
Qty: 90 TABLET | Refills: 0 | Status: SHIPPED | OUTPATIENT
Start: 2021-05-10 | End: 2021-07-21 | Stop reason: SDUPTHER

## 2021-05-26 DIAGNOSIS — E78.2 HYPERCHOLESTEROLEMIA WITH HYPERTRIGLYCERIDEMIA: ICD-10-CM

## 2021-05-26 RX ORDER — OMEGA-3-ACID ETHYL ESTERS 1 G/1
2 CAPSULE, LIQUID FILLED ORAL 2 TIMES DAILY
Qty: 60 CAPSULE | Refills: 3 | Status: SHIPPED | OUTPATIENT
Start: 2021-05-26 | End: 2021-07-21 | Stop reason: SDUPTHER

## 2021-05-26 RX ORDER — ATORVASTATIN CALCIUM 80 MG/1
80 TABLET, FILM COATED ORAL NIGHTLY
Qty: 90 TABLET | Refills: 3 | Status: SHIPPED | OUTPATIENT
Start: 2021-05-26 | End: 2021-07-21 | Stop reason: SDUPTHER

## 2021-05-26 NOTE — TELEPHONE ENCOUNTER
Joshua Benjamin called requesting a refill on the following medications:  Requested Prescriptions     Pending Prescriptions Disp Refills    omega-3 acid ethyl esters (LOVAZA) 1 g capsule 60 capsule 3     Sig: Take 2 capsules by mouth 2 times daily    atorvastatin (LIPITOR) 80 MG tablet 90 tablet 3     Sig: Take 1 tablet by mouth nightly     Pharmacy verified:  [email or web address]      Date of last visit: 11/9/2021  Date of next visit (if applicable): 4/36/8442

## 2021-05-28 ENCOUNTER — TELEPHONE (OUTPATIENT)
Dept: FAMILY MEDICINE CLINIC | Age: 66
End: 2021-05-28

## 2021-05-28 DIAGNOSIS — E78.2 HYPERCHOLESTEROLEMIA WITH HYPERTRIGLYCERIDEMIA: ICD-10-CM

## 2021-05-28 RX ORDER — FENOFIBRATE 160 MG/1
160 TABLET ORAL DAILY
Qty: 90 TABLET | Refills: 3 | Status: SHIPPED | OUTPATIENT
Start: 2021-05-28 | End: 2021-07-21 | Stop reason: SDUPTHER

## 2021-06-01 RX ORDER — FENOFIBRATE 160 MG/1
TABLET ORAL
Qty: 90 TABLET | Refills: 0 | OUTPATIENT
Start: 2021-06-01

## 2021-07-14 ENCOUNTER — NURSE ONLY (OUTPATIENT)
Dept: FAMILY MEDICINE CLINIC | Age: 66
End: 2021-07-14
Payer: MEDICARE

## 2021-07-14 ENCOUNTER — HOSPITAL ENCOUNTER (OUTPATIENT)
Age: 66
Setting detail: SPECIMEN
Discharge: HOME | End: 2021-07-14
Payer: MEDICARE

## 2021-07-14 DIAGNOSIS — G63 VITAMIN B12 DEFICIENCY NEUROPATHY (HCC): ICD-10-CM

## 2021-07-14 DIAGNOSIS — E53.8 VITAMIN B12 DEFICIENCY NEUROPATHY (HCC): ICD-10-CM

## 2021-07-14 DIAGNOSIS — E78.2 HYPERCHOLESTEROLEMIA WITH HYPERTRIGLYCERIDEMIA: ICD-10-CM

## 2021-07-14 DIAGNOSIS — E03.9 HYPOTHYROIDISM, UNSPECIFIED TYPE: ICD-10-CM

## 2021-07-14 DIAGNOSIS — R10.31 RIGHT LOWER QUADRANT PAIN: ICD-10-CM

## 2021-07-14 DIAGNOSIS — Z11.59 NEED FOR HEPATITIS C SCREENING TEST: ICD-10-CM

## 2021-07-14 DIAGNOSIS — I10 ESSENTIAL HYPERTENSION: ICD-10-CM

## 2021-07-14 LAB
ABSOLUTE EOS #: 0.3 K/UL (ref 0–0.44)
ABSOLUTE IMMATURE GRANULOCYTE: 0.04 K/UL (ref 0–0.3)
ABSOLUTE LYMPH #: 1.95 K/UL (ref 1.1–3.7)
ABSOLUTE MONO #: 0.88 K/UL (ref 0.1–1.2)
BASOPHILS # BLD: 1 % (ref 0–2)
BASOPHILS ABSOLUTE: 0.06 K/UL (ref 0–0.2)
DIFFERENTIAL TYPE: ABNORMAL
EOSINOPHILS RELATIVE PERCENT: 3 % (ref 1–4)
FOLATE: 7.2 NG/ML
HCT VFR BLD CALC: 49.4 % (ref 40.7–50.3)
HEMOGLOBIN: 15.2 G/DL (ref 13–17)
HEPATITIS C ANTIBODY: NONREACTIVE
IMMATURE GRANULOCYTES: 0 %
LYMPHOCYTES # BLD: 20 % (ref 24–43)
MCH RBC QN AUTO: 27.2 PG (ref 25.2–33.5)
MCHC RBC AUTO-ENTMCNC: 30.8 G/DL (ref 28.4–34.8)
MCV RBC AUTO: 88.5 FL (ref 82.6–102.9)
MONOCYTES # BLD: 9 % (ref 3–12)
NRBC AUTOMATED: 0 PER 100 WBC
PDW BLD-RTO: 13.2 % (ref 11.8–14.4)
PLATELET # BLD: 264 K/UL (ref 138–453)
PLATELET ESTIMATE: ABNORMAL
PMV BLD AUTO: 12.1 FL (ref 8.1–13.5)
RBC # BLD: 5.58 M/UL (ref 4.21–5.77)
RBC # BLD: ABNORMAL 10*6/UL
SEG NEUTROPHILS: 67 % (ref 36–65)
SEGMENTED NEUTROPHILS ABSOLUTE COUNT: 6.76 K/UL (ref 1.5–8.1)
VITAMIN B-12: 371 PG/ML (ref 232–1245)
WBC # BLD: 10 K/UL (ref 3.5–11.3)
WBC # BLD: ABNORMAL 10*3/UL

## 2021-07-14 PROCEDURE — 36415 COLL VENOUS BLD VENIPUNCTURE: CPT | Performed by: NURSE PRACTITIONER

## 2021-07-14 NOTE — PROGRESS NOTES
Venipuncture obtained from  right arm. Patient tolerated the procedure without complications or complaints.     1 pst   1 lvv   1 sst
ED

## 2021-07-15 LAB
ALBUMIN SERPL-MCNC: 4.6 G/DL (ref 3.5–5.2)
ALBUMIN/GLOBULIN RATIO: 1.3 (ref 1–2.5)
ALP BLD-CCNC: 64 U/L (ref 40–129)
ALT SERPL-CCNC: 16 U/L (ref 5–41)
ANION GAP SERPL CALCULATED.3IONS-SCNC: 20 MMOL/L (ref 9–17)
AST SERPL-CCNC: 26 U/L
BILIRUB SERPL-MCNC: 0.72 MG/DL (ref 0.3–1.2)
BUN BLDV-MCNC: 14 MG/DL (ref 8–23)
BUN/CREAT BLD: ABNORMAL (ref 9–20)
CALCIUM SERPL-MCNC: 9.3 MG/DL (ref 8.6–10.4)
CHLORIDE BLD-SCNC: 104 MMOL/L (ref 98–107)
CHOLESTEROL/HDL RATIO: 6.8
CHOLESTEROL: 251 MG/DL
CO2: 17 MMOL/L (ref 20–31)
CREAT SERPL-MCNC: 0.96 MG/DL (ref 0.7–1.2)
GFR AFRICAN AMERICAN: >60 ML/MIN
GFR NON-AFRICAN AMERICAN: >60 ML/MIN
GFR SERPL CREATININE-BSD FRML MDRD: ABNORMAL ML/MIN/{1.73_M2}
GFR SERPL CREATININE-BSD FRML MDRD: ABNORMAL ML/MIN/{1.73_M2}
GLUCOSE BLD-MCNC: 73 MG/DL (ref 70–99)
HDLC SERPL-MCNC: 37 MG/DL
LDL CHOLESTEROL: 145 MG/DL (ref 0–130)
POTASSIUM SERPL-SCNC: 4 MMOL/L (ref 3.7–5.3)
SODIUM BLD-SCNC: 141 MMOL/L (ref 135–144)
TOTAL PROTEIN: 8.2 G/DL (ref 6.4–8.3)
TRIGL SERPL-MCNC: 344 MG/DL
TSH SERPL DL<=0.05 MIU/L-ACNC: 3.02 MIU/L (ref 0.3–5)
VLDLC SERPL CALC-MCNC: ABNORMAL MG/DL (ref 1–30)

## 2021-07-21 ENCOUNTER — OFFICE VISIT (OUTPATIENT)
Dept: FAMILY MEDICINE CLINIC | Age: 66
End: 2021-07-21
Payer: MEDICARE

## 2021-07-21 VITALS
TEMPERATURE: 97.5 F | OXYGEN SATURATION: 97 % | HEART RATE: 77 BPM | BODY MASS INDEX: 24.22 KG/M2 | WEIGHT: 168.8 LBS | RESPIRATION RATE: 16 BRPM | SYSTOLIC BLOOD PRESSURE: 127 MMHG | DIASTOLIC BLOOD PRESSURE: 72 MMHG

## 2021-07-21 DIAGNOSIS — I10 ESSENTIAL HYPERTENSION: ICD-10-CM

## 2021-07-21 DIAGNOSIS — E78.2 HYPERCHOLESTEROLEMIA WITH HYPERTRIGLYCERIDEMIA: ICD-10-CM

## 2021-07-21 DIAGNOSIS — E03.9 HYPOTHYROIDISM, UNSPECIFIED TYPE: ICD-10-CM

## 2021-07-21 PROCEDURE — 99214 OFFICE O/P EST MOD 30 MIN: CPT | Performed by: NURSE PRACTITIONER

## 2021-07-21 PROCEDURE — G8427 DOCREV CUR MEDS BY ELIG CLIN: HCPCS | Performed by: NURSE PRACTITIONER

## 2021-07-21 PROCEDURE — 1036F TOBACCO NON-USER: CPT | Performed by: NURSE PRACTITIONER

## 2021-07-21 PROCEDURE — 4040F PNEUMOC VAC/ADMIN/RCVD: CPT | Performed by: NURSE PRACTITIONER

## 2021-07-21 PROCEDURE — 3017F COLORECTAL CA SCREEN DOC REV: CPT | Performed by: NURSE PRACTITIONER

## 2021-07-21 PROCEDURE — G8420 CALC BMI NORM PARAMETERS: HCPCS | Performed by: NURSE PRACTITIONER

## 2021-07-21 PROCEDURE — 1123F ACP DISCUSS/DSCN MKR DOCD: CPT | Performed by: NURSE PRACTITIONER

## 2021-07-21 RX ORDER — ATORVASTATIN CALCIUM 80 MG/1
80 TABLET, FILM COATED ORAL NIGHTLY
Qty: 90 TABLET | Refills: 3 | Status: SHIPPED | OUTPATIENT
Start: 2021-07-21 | End: 2022-05-24 | Stop reason: SDUPTHER

## 2021-07-21 RX ORDER — OMEGA-3-ACID ETHYL ESTERS 1 G/1
2 CAPSULE, LIQUID FILLED ORAL 2 TIMES DAILY
Qty: 60 CAPSULE | Refills: 5 | Status: SHIPPED | OUTPATIENT
Start: 2021-07-21

## 2021-07-21 RX ORDER — LEVOTHYROXINE SODIUM 0.03 MG/1
TABLET ORAL
Qty: 90 TABLET | Refills: 2 | Status: SHIPPED | OUTPATIENT
Start: 2021-07-21 | Stop reason: SDUPTHER

## 2021-07-21 RX ORDER — FENOFIBRATE 160 MG/1
160 TABLET ORAL DAILY
Qty: 90 TABLET | Refills: 3 | Status: SHIPPED | OUTPATIENT
Start: 2021-07-21 | End: 2022-05-24 | Stop reason: SDUPTHER

## 2021-07-21 RX ORDER — AMLODIPINE BESYLATE 10 MG/1
TABLET ORAL
Qty: 90 TABLET | Refills: 2 | Status: SHIPPED | OUTPATIENT
Start: 2021-07-21 | End: 2021-11-09 | Stop reason: SDUPTHER

## 2021-07-21 SDOH — ECONOMIC STABILITY: FOOD INSECURITY: WITHIN THE PAST 12 MONTHS, YOU WORRIED THAT YOUR FOOD WOULD RUN OUT BEFORE YOU GOT MONEY TO BUY MORE.: NEVER TRUE

## 2021-07-21 SDOH — ECONOMIC STABILITY: TRANSPORTATION INSECURITY
IN THE PAST 12 MONTHS, HAS THE LACK OF TRANSPORTATION KEPT YOU FROM MEDICAL APPOINTMENTS OR FROM GETTING MEDICATIONS?: NO

## 2021-07-21 SDOH — ECONOMIC STABILITY: TRANSPORTATION INSECURITY
IN THE PAST 12 MONTHS, HAS LACK OF TRANSPORTATION KEPT YOU FROM MEETINGS, WORK, OR FROM GETTING THINGS NEEDED FOR DAILY LIVING?: NO

## 2021-07-21 SDOH — ECONOMIC STABILITY: FOOD INSECURITY: WITHIN THE PAST 12 MONTHS, THE FOOD YOU BOUGHT JUST DIDN'T LAST AND YOU DIDN'T HAVE MONEY TO GET MORE.: NEVER TRUE

## 2021-07-21 ASSESSMENT — SOCIAL DETERMINANTS OF HEALTH (SDOH): HOW HARD IS IT FOR YOU TO PAY FOR THE VERY BASICS LIKE FOOD, HOUSING, MEDICAL CARE, AND HEATING?: NOT HARD AT ALL

## 2021-07-21 NOTE — PROGRESS NOTES
Priti David [address] [facility] [address] [facility] [address]  Dept: [phone]  Dept Fax: [phone]    Visit type: Established patient    Reason for Visit: Hypertension (3 month follow up)         Assessment and Plan       1. Hypothyroidism, unspecified type  -     levothyroxine (EUTHYROX) 25 MCG tablet; TAKE 1 TABLET BY MOUTH ONCE DAILY WITH  WATER  ONLY  ON  AN  EMPTY  STOMACH  WAIT  30  MINUTES  BEFORE  EATING  OR  TAKING  OTHER  MEDS, Disp-90 tablet, R-2Normal  2. Hypercholesterolemia with hypertriglyceridemia  -     atorvastatin (LIPITOR) 80 MG tablet; Take 1 tablet by mouth nightly, Disp-90 tablet, R-3Normal  -     Lipid Panel; Future  3. Essential hypertension  -     amLODIPine (NORVASC) 10 MG tablet; Take 1 tablet by mouth once daily, Disp-90 tablet, R-2Normal    Will change to crestor if lipitor  If no improvement on lipid panel  Reviewed weight loss  No follow-ups on file. Subjective       Hyperlipidemia. Onset years ago. Is taking statin and fenofibrate    Taking fish oil   Tolerating the high dose without side effects. The 10-year ASCVD risk score (Js Toledo., et al., 2013) is: 16.9%    Values used to calculate the score:      Age: 71 years      Sex: Male      Is Non- : No      Diabetic: No      Tobacco smoker: No      Systolic Blood Pressure: 532 mmHg      Is BP treated: Yes      HDL Cholesterol: 36 mg/dL      Total Cholesterol: 222 mg/dL      HTN  Onset years ago  Taking norvasc. CP Or palpitations> no   Hx of TIA involving carotid artery. Is here for fu of cardiac testing   Diet- is trying to eat in moderation     Hypothyroidism  Onset years ago  Taking supplement  No skin hair or nail changes  Heat or cold intolerance?  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   Denies SI and HI    Vitamin b 12 deficient neuropathy  Onset years ago  Areas affected bilateral feet            Review of Systems     Allergies   Allergen Reactions    Shellfish-Derived Products        Outpatient Medications Prior to Visit   Medication Sig Dispense Refill    aspirin 81 MG tablet Take 81 mg by mouth daily      fenofibrate (TRIGLIDE) 160 MG tablet Take 1 tablet by mouth daily 90 tablet 3    omega-3 acid ethyl esters (LOVAZA) 1 g capsule Take 2 capsules by mouth 2 times daily 60 capsule 3    atorvastatin (LIPITOR) 80 MG tablet Take 1 tablet by mouth nightly 90 tablet 3    levothyroxine (EUTHYROX) 25 MCG tablet TAKE 1 TABLET BY MOUTH ONCE DAILY WITH  WATER  ONLY  ON  AN  EMPTY  STOMACH  WAIT  30  MINUTES  BEFORE  EATING  OR  TAKING  OTHER  MEDS 90 tablet 0    amLODIPine (NORVASC) 10 MG tablet Take 1 tablet by mouth once daily 90 tablet 2    fenofibrate (TRIGLIDE) 160 MG tablet Take 1 tablet by mouth daily (Patient not taking: Reported on 7/21/2021) 90 tablet 3     No facility-administered medications prior to visit.         Past Medical History:   Diagnosis Date    [facility]     Current moderate episode of major depressive disorder without prior episode ([facility] 9/19/2019    Migraine     TIA (transient ischemic attack)         Social History     Tobacco Use    Smoking status: Never Smoker    Smokeless tobacco: Never Used   Substance Use Topics    Alcohol use: Yes     Comment: social        Past Surgical History:   Procedure Laterality Date    KNEE SURGERY  1983    both knees     MYRINGOTOMY AND TYMPANOSTOMY TUBE PLACEMENT Left 04/16/2013    Dr Mery Nix SINUS SURGERY         Family History   Problem Relation Age of Onset    Arthritis Mother        Objective       /72 (Site: Left Upper Arm, Position: Sitting, Cuff Size: Large Adult)   Pulse 77   Temp 97.5 °F (36.4 °C) (Temporal)   Resp 16   Wt 168 lb 12.8 oz (76.6 kg)   SpO2 97%   BMI 24.22 kg/m²   Physical Exam      Data Reviewed and Summarized       Labs:     Imaging/Testing:        Rosmery Peñaloza Violet Gonzalez - CNP

## 2021-08-06 DIAGNOSIS — E03.9 HYPOTHYROIDISM, UNSPECIFIED TYPE: ICD-10-CM

## 2021-08-06 RX ORDER — LEVOTHYROXINE SODIUM 0.03 MG/1
TABLET ORAL
Qty: 90 TABLET | Refills: 2 | Status: SHIPPED | OUTPATIENT
Start: 2021-08-06 | End: 2022-05-25 | Stop reason: SDUPTHER

## 2021-08-06 NOTE — TELEPHONE ENCOUNTER
Patient called office requesting medication refill   Last appt 07/21/21  Upcoming appt 01/26/22  rx pended

## 2021-08-18 ENCOUNTER — NURSE ONLY (OUTPATIENT)
Dept: FAMILY MEDICINE CLINIC | Age: 66
End: 2021-08-18
Payer: MEDICARE

## 2021-08-18 ENCOUNTER — HOSPITAL ENCOUNTER (OUTPATIENT)
Age: 66
Setting detail: SPECIMEN
Discharge: HOME | End: 2021-08-18
Payer: MEDICARE

## 2021-08-18 DIAGNOSIS — E78.2 HYPERCHOLESTEROLEMIA WITH HYPERTRIGLYCERIDEMIA: ICD-10-CM

## 2021-08-18 DIAGNOSIS — I10 ESSENTIAL HYPERTENSION: ICD-10-CM

## 2021-08-18 PROCEDURE — 36415 COLL VENOUS BLD VENIPUNCTURE: CPT | Performed by: NURSE PRACTITIONER

## 2021-08-18 NOTE — PROGRESS NOTES
Venipuncture obtained from  right arm. Patient tolerated the procedure without complications or complaints.       1 pst

## 2021-08-19 LAB
CHOLESTEROL/HDL RATIO: 7.1
CHOLESTEROL: 249 MG/DL
HDLC SERPL-MCNC: 35 MG/DL
LDL CHOLESTEROL: 164 MG/DL (ref 0–130)
TRIGL SERPL-MCNC: 250 MG/DL
VLDLC SERPL CALC-MCNC: ABNORMAL MG/DL (ref 1–30)

## 2021-11-09 DIAGNOSIS — I10 ESSENTIAL HYPERTENSION: ICD-10-CM

## 2021-11-09 RX ORDER — AMLODIPINE BESYLATE 10 MG/1
TABLET ORAL
Qty: 90 TABLET | Refills: 2 | Status: SHIPPED | OUTPATIENT
Start: 2021-11-09 | End: 2022-08-21 | Stop reason: SDUPTHER

## 2021-12-23 ENCOUNTER — TELEPHONE (OUTPATIENT)
Dept: FAMILY MEDICINE CLINIC | Age: 66
End: 2021-12-23

## 2021-12-23 ENCOUNTER — VIRTUAL VISIT (OUTPATIENT)
Dept: FAMILY MEDICINE CLINIC | Age: 66
End: 2021-12-23
Payer: MEDICARE

## 2021-12-23 DIAGNOSIS — U07.1 BRONCHITIS DUE TO COVID-19 VIRUS: Primary | ICD-10-CM

## 2021-12-23 DIAGNOSIS — J40 BRONCHITIS DUE TO COVID-19 VIRUS: Primary | ICD-10-CM

## 2021-12-23 PROCEDURE — G2025 DIS SITE TELE SVCS RHC/FQHC: HCPCS | Performed by: NURSE PRACTITIONER

## 2021-12-23 PROCEDURE — 1123F ACP DISCUSS/DSCN MKR DOCD: CPT | Performed by: NURSE PRACTITIONER

## 2021-12-23 PROCEDURE — G8427 DOCREV CUR MEDS BY ELIG CLIN: HCPCS | Performed by: NURSE PRACTITIONER

## 2021-12-23 PROCEDURE — 4040F PNEUMOC VAC/ADMIN/RCVD: CPT | Performed by: NURSE PRACTITIONER

## 2021-12-23 PROCEDURE — 3017F COLORECTAL CA SCREEN DOC REV: CPT | Performed by: NURSE PRACTITIONER

## 2021-12-23 RX ORDER — BENZONATATE 200 MG/1
200 CAPSULE ORAL 3 TIMES DAILY PRN
Qty: 21 CAPSULE | Refills: 0 | Status: SHIPPED | OUTPATIENT
Start: 2021-12-23 | End: 2021-12-30

## 2021-12-23 ASSESSMENT — ENCOUNTER SYMPTOMS
DIARRHEA: 0
VOMITING: 0
COUGH: 0
EYE DISCHARGE: 0
RHINORRHEA: 0
SORE THROAT: 1
SHORTNESS OF BREATH: 0
CONSTIPATION: 0
ABDOMINAL PAIN: 0
CHEST TIGHTNESS: 0

## 2021-12-23 NOTE — TELEPHONE ENCOUNTER
Pt called and stated he tested positive for covid and was wondering what he can help to alleviate the chest pain and sore throat.

## 2021-12-23 NOTE — PROGRESS NOTES
Murtaza Cochran (:  1955) is a 78 y.o. male,Established patient, here for evaluation of the following chief complaint(s): Positive For Covid-19 [facility]--wants therapuetics, Sore throat 95% O2)         ASSESSMENT/PLAN:  1. Bronchitis due to COVID-19 virus  -     budesonide (PULMICORT FLEXHALER) 90 MCG/ACT AEPB inhaler; Inhale 2 puffs into the lungs 2 times daily, Disp-1 each, R-0Normal    Out of quarantine on    Declines monoclonal infusion  Continue vit d, vit c, vit b12 and zinc OTC   If symptoms persist or worsen and going into week 2 will send in zpak and prednisone  Return if symptoms worsen or fail to improve. SUBJECTIVE/OBJECTIVE:  covid positive  Onset   Took kit   Feeling better overall  Throat pain, fatigue, chest tightness  Has had covid vaccination   Hx of HTN  Not sleeping at night       Review of Systems   Constitutional: Positive for fatigue. Negative for activity change, appetite change and fever. HENT: Positive for sore throat. Negative for congestion, ear pain and rhinorrhea. Eyes: Negative for discharge and visual disturbance. Respiratory: Negative for cough, chest tightness and shortness of breath. Cardiovascular: Negative for chest pain and palpitations. Gastrointestinal: Negative for abdominal pain, constipation, diarrhea and vomiting. Genitourinary: Negative for difficulty urinating and hematuria. Musculoskeletal: Negative for arthralgias and myalgias. Skin: Negative for rash. Neurological: Negative for dizziness, weakness, numbness and headaches. Psychiatric/Behavioral: The patient is not nervous/anxious. No flowsheet data found.      Physical Exam    [INSTRUCTIONS:  \"[x]\" Indicates a positive item  \"[]\" Indicates a negative item  -- DELETE ALL ITEMS NOT EXAMINED]    Constitutional: [x] Appears well-developed and well-nourished [x] No apparent distress      [] Abnormal -     Mental status: [x] Alert and awake  [x] Oriented to person/place/time [x] Able to follow commands    [] Abnormal -     Eyes:   EOM    [x]  Normal    [] Abnormal -   Sclera  [x]  Normal    [] Abnormal -          Discharge [x]  None visible   [] Abnormal -     HENT: [x] Normocephalic, atraumatic  [] Abnormal -   [x] Mouth/Throat: Mucous membranes are moist    External Ears [x] Normal  [] Abnormal -    Neck: [x] No visualized mass [] Abnormal -     Pulmonary/Chest: [x] Respiratory effort normal   [x] No visualized signs of difficulty breathing or respiratory distress        [] Abnormal -      Musculoskeletal:   [x] Normal gait with no signs of ataxia         [x] Normal range of motion of neck        [] Abnormal -     Neurological:        [x] No Facial Asymmetry (Cranial nerve 7 motor function) (limited exam due to video visit)          [x] No gaze palsy        [] Abnormal -          Skin:        [x] No significant exanthematous lesions or discoloration noted on facial skin         [] Abnormal -            Psychiatric:       [x] Normal Affect [] Abnormal -        [x] No Hallucinations    Other pertinent observable physical exam findings:-              Marc Carrion, was evaluated through a synchronous (real-time) audio-video encounter. The patient (or guardian if applicable) is aware that this is a billable service. Verbal consent to proceed has been obtained within the past 12 months. The visit was conducted pursuant to the emergency declaration under the [facility], [address] [place] [address] authority and the [employer] General Act. Patient identification was verified, and a caregiver was present when appropriate. The patient was located in a state where the provider was credentialed to provide care.       An electronic signature was used to authenticate this note.    --Vandana Alcantar, HEVER - CNP

## 2022-01-18 ENCOUNTER — OFFICE VISIT (OUTPATIENT)
Dept: ENT CLINIC | Age: 67
End: 2022-01-18
Payer: MEDICARE

## 2022-01-18 VITALS
RESPIRATION RATE: 12 BRPM | SYSTOLIC BLOOD PRESSURE: 136 MMHG | DIASTOLIC BLOOD PRESSURE: 80 MMHG | TEMPERATURE: 97.9 F | WEIGHT: 173.3 LBS | BODY MASS INDEX: 24.81 KG/M2 | HEART RATE: 67 BPM | HEIGHT: 70 IN | OXYGEN SATURATION: 96 %

## 2022-01-18 DIAGNOSIS — H90.0 HEARING LOSS, CONDUCTIVE, BILATERAL: ICD-10-CM

## 2022-01-18 DIAGNOSIS — H65.93 RECURRENT SEROUS OTITIS MEDIA, BILATERAL: Primary | ICD-10-CM

## 2022-01-18 DIAGNOSIS — H90.3 SENSORINEURAL HEARING LOSS (SNHL) OF BOTH EARS: ICD-10-CM

## 2022-01-18 PROCEDURE — 1036F TOBACCO NON-USER: CPT | Performed by: OTOLARYNGOLOGY

## 2022-01-18 PROCEDURE — G8420 CALC BMI NORM PARAMETERS: HCPCS | Performed by: OTOLARYNGOLOGY

## 2022-01-18 PROCEDURE — G8427 DOCREV CUR MEDS BY ELIG CLIN: HCPCS | Performed by: OTOLARYNGOLOGY

## 2022-01-18 PROCEDURE — 3017F COLORECTAL CA SCREEN DOC REV: CPT | Performed by: OTOLARYNGOLOGY

## 2022-01-18 PROCEDURE — 1123F ACP DISCUSS/DSCN MKR DOCD: CPT | Performed by: OTOLARYNGOLOGY

## 2022-01-18 PROCEDURE — 4040F PNEUMOC VAC/ADMIN/RCVD: CPT | Performed by: OTOLARYNGOLOGY

## 2022-01-18 PROCEDURE — G8484 FLU IMMUNIZE NO ADMIN: HCPCS | Performed by: OTOLARYNGOLOGY

## 2022-01-18 PROCEDURE — 99213 OFFICE O/P EST LOW 20 MIN: CPT | Performed by: OTOLARYNGOLOGY

## 2022-01-18 ASSESSMENT — ENCOUNTER SYMPTOMS
VOICE CHANGE: 0
CHEST TIGHTNESS: 0
NAUSEA: 0
VOMITING: 0
TROUBLE SWALLOWING: 0
CHOKING: 0
SHORTNESS OF BREATH: 0
SORE THROAT: 0
SINUS PRESSURE: 0
COUGH: 0
WHEEZING: 0
APNEA: 0
RHINORRHEA: 0
FACIAL SWELLING: 0
COLOR CHANGE: 0
DIARRHEA: 0
ABDOMINAL PAIN: 0
STRIDOR: 0

## 2022-01-18 NOTE — PROGRESS NOTES
[facility] PHYSICIANS [place] [facility]  [facility] EAR, NOSE AND THROAT  [facility]  Dept: [phone]  Dept Fax: [phone]  Loc: [phone]    Willena Babinski is a 78 y.o. male who was referred byNo ref. provider found for:  Chief Complaint   Patient presents with   Lai Straussr Ear Problem     Patient here for left ear tube issues. Complains it feels plugged. Gloria Walker HPI:     Willena Babinski is a 78 y.o. male who presents today for follow-up on his left ear tube. He had COVID over a month ago and then he had what felt like a head cold. Now he can't hear. He wears a hearing aid in the right ear which was his good ear. He simply cannot work even with his hearing aids in. History: Allergies   Allergen Reactions    Shellfish-Derived Products      Current Outpatient Medications   Medication Sig Dispense Refill    budesonide (PULMICORT FLEXHALER) 90 MCG/ACT AEPB inhaler Inhale 2 puffs into the lungs 2 times daily 1 each 0    amLODIPine (NORVASC) 10 MG tablet Take 1 tablet by mouth once daily 90 tablet 2    levothyroxine (EUTHYROX) 25 MCG tablet TAKE 1 TABLET BY MOUTH ONCE DAILY WITH  WATER  ONLY  ON  AN  EMPTY  STOMACH  WAIT  30  MINUTES  BEFORE  EATING  OR  TAKING  OTHER  MEDS 90 tablet 2    omega-3 acid ethyl esters (LOVAZA) 1 g capsule Take 2 capsules by mouth 2 times daily 60 capsule 5    fenofibrate (TRIGLIDE) 160 MG tablet Take 1 tablet by mouth daily 90 tablet 3    atorvastatin (LIPITOR) 80 MG tablet Take 1 tablet by mouth nightly 90 tablet 3    aspirin 81 MG tablet Take 81 mg by mouth daily       No current facility-administered medications for this visit.      Past Medical History:   Diagnosis Date    [facility] spondyl[facility]     Current moderate episode of major depressive disorder without prior episode ([facility] 9/19/2019    Migraine     TIA (transient ischemic attack)       Past Surgical History:   Procedure Laterality Date   9 [facility] SpO2 96%   BMI 24.87 kg/m²     Physical Exam   Ears: Bilateral serous otitis media. Patient was very hard of hearing without his hearing aid. PROCEDURE: MYRINGOTOMY AND TYMPANOSTOMY TUBE PLACEMENT, BILATERAL    After appropriate informed consent was obtained, including benefits and risks, answering all of the patient's questions, and explaining other options and that there were no guarantees, the patient requested that we proceed. After cleaning the right ear canal, anesthesia of the posterior-inferior quadrant of the tympanic membrane was obtained with topical Phenol. After this had taken effect, a radial incision was created and the middle ear ear examined with the following findings: Serous fluid. An Agosto ventilation tube was placed without difficulty. A similar procedure was performed on the opposite ear with findings as noted: Serous fluid; he had marked improvement in hearing bilaterally    Data:  All of the past medical history, past surgical history, family history,social history, allergies and current medications were reviewed with the patient. Assessment & Plan   Diagnoses and all orders for this visit:     Diagnosis Orders   1. Recurrent serous otitis media, bilateral     2. Hearing loss, conductive, bilateral     3. Sensorineural hearing loss (SNHL) of both ears         The findings were explained and his questions were answered. Bilateral tube placements resulted in marked improvement in his hearing. He will now be able to work. Return in about 6 weeks (around 3/1/2022) for Tube Check. Sri Foster. Arabella Chahal MD    **This report has been created using voice recognition software. It may contain minor errors which are inherent in voicerecognition technology. **

## 2022-01-26 ENCOUNTER — VIRTUAL VISIT (OUTPATIENT)
Dept: FAMILY MEDICINE CLINIC | Age: 67
End: 2022-01-26
Payer: MEDICARE

## 2022-01-26 DIAGNOSIS — G63 VITAMIN B12 DEFICIENCY NEUROPATHY (HCC): ICD-10-CM

## 2022-01-26 DIAGNOSIS — I10 ESSENTIAL HYPERTENSION: ICD-10-CM

## 2022-01-26 DIAGNOSIS — Z00.00 ROUTINE GENERAL MEDICAL EXAMINATION AT A HEALTH CARE FACILITY: Primary | ICD-10-CM

## 2022-01-26 DIAGNOSIS — E53.8 VITAMIN B12 DEFICIENCY NEUROPATHY (HCC): ICD-10-CM

## 2022-01-26 DIAGNOSIS — M45.6 ANKYLOSING SPONDYLITIS OF LUMBAR REGION (HCC): ICD-10-CM

## 2022-01-26 DIAGNOSIS — Z12.5 SCREENING FOR PROSTATE CANCER: ICD-10-CM

## 2022-01-26 PROCEDURE — 3017F COLORECTAL CA SCREEN DOC REV: CPT | Performed by: NURSE PRACTITIONER

## 2022-01-26 PROCEDURE — G0439 PPPS, SUBSEQ VISIT: HCPCS | Performed by: NURSE PRACTITIONER

## 2022-01-26 PROCEDURE — 4040F PNEUMOC VAC/ADMIN/RCVD: CPT | Performed by: NURSE PRACTITIONER

## 2022-01-26 PROCEDURE — G8484 FLU IMMUNIZE NO ADMIN: HCPCS | Performed by: NURSE PRACTITIONER

## 2022-01-26 PROCEDURE — 1123F ACP DISCUSS/DSCN MKR DOCD: CPT | Performed by: NURSE PRACTITIONER

## 2022-01-26 ASSESSMENT — LIFESTYLE VARIABLES
HAS A RELATIVE, FRIEND, DOCTOR, OR ANOTHER HEALTH PROFESSIONAL EXPRESSED CONCERN ABOUT YOUR DRINKING OR SUGGESTED YOU CUT DOWN: 0
HOW OFTEN DURING THE LAST YEAR HAVE YOU FOUND THAT YOU WERE NOT ABLE TO STOP DRINKING ONCE YOU HAD STARTED: 0
HAVE YOU OR SOMEONE ELSE BEEN INJURED AS A RESULT OF YOUR DRINKING: 0
HOW OFTEN DURING THE LAST YEAR HAVE YOU BEEN UNABLE TO REMEMBER WHAT HAPPENED THE NIGHT BEFORE BECAUSE YOU HAD BEEN DRINKING: 0
HOW MANY STANDARD DRINKS CONTAINING ALCOHOL DO YOU HAVE ON A TYPICAL DAY: 1
HOW OFTEN DO YOU HAVE SIX OR MORE DRINKS ON ONE OCCASION: 0
HOW OFTEN DURING THE LAST YEAR HAVE YOU NEEDED AN ALCOHOLIC DRINK FIRST THING IN THE MORNING TO GET YOURSELF GOING AFTER A NIGHT OF HEAVY DRINKING: 0
HOW OFTEN DO YOU HAVE A DRINK CONTAINING ALCOHOL: 3
HOW OFTEN DURING THE LAST YEAR HAVE YOU HAD A FEELING OF GUILT OR REMORSE AFTER DRINKING: 0
AUDIT TOTAL SCORE: 4
AUDIT-C TOTAL SCORE: 4
HOW OFTEN DURING THE LAST YEAR HAVE YOU FAILED TO DO WHAT WAS NORMALLY EXPECTED FROM YOU BECAUSE OF DRINKING: 0

## 2022-01-26 ASSESSMENT — PATIENT HEALTH QUESTIONNAIRE - PHQ9
SUM OF ALL RESPONSES TO PHQ9 QUESTIONS 1 & 2: 0
SUM OF ALL RESPONSES TO PHQ QUESTIONS 1-9: 0
SUM OF ALL RESPONSES TO PHQ QUESTIONS 1-9: 0
1. LITTLE INTEREST OR PLEASURE IN DOING THINGS: 0
SUM OF ALL RESPONSES TO PHQ QUESTIONS 1-9: 0
SUM OF ALL RESPONSES TO PHQ QUESTIONS 1-9: 0
2. FEELING DOWN, DEPRESSED OR HOPELESS: 0

## 2022-01-26 NOTE — PROGRESS NOTES
Medicare Annual Wellness Visit  Name: Vee Hills Date: 2022   MRN: [id number] Sex: Male   Age: 78 y.o. Ethnicity: Non- / Non    : 1955 Race: White (non-)      Christy Dias is here for Medicare AWV (yearly appointment)    Screenings for behavioral, psychosocial and functional/safety risks, and cognitive dysfunction are all negative except as indicated below. These results, as well as other patient data from the [address] form, are documented in Flowsheets linked to this Encounter. Allergies   Allergen Reactions    Shellfish-Derived Products          Prior to Visit Medications    Medication Sig Taking?  Authorizing Provider   budesonide (PULMICORT FLEXHALER) 90 MCG/ACT AEPB inhaler Inhale 2 puffs into the lungs 2 times daily Yes Abbi Balderrama APRN - CNP   amLODIPine (NORVASC) 10 MG tablet Take 1 tablet by mouth once daily Yes Abbi Balderrama APRN - CNP   levothyroxine (EUTHYROX) 25 MCG tablet TAKE 1 TABLET BY MOUTH ONCE DAILY WITH  WATER  ONLY  ON  AN  EMPTY  STOMACH  WAIT  30  MINUTES  BEFORE  EATING  OR  TAKING  OTHER  MEDS Yes Abbi Balderrama APRN - CNP   omega-3 acid ethyl esters (LOVAZA) 1 g capsule Take 2 capsules by mouth 2 times daily Yes Abbi Balderrama APRN - CNP   fenofibrate (TRIGLIDE) 160 MG tablet Take 1 tablet by mouth daily Yes Abbi Balderrama APRN - CNP   atorvastatin (LIPITOR) 80 MG tablet Take 1 tablet by mouth nightly Yes Abbi Balderrama APRN - CNP   aspirin 81 MG tablet Take 81 mg by mouth daily Yes Historical Provider, MD   levothyroxine (EUTHYROX) 25 MCG tablet TAKE 1 TABLET BY MOUTH ONCE DAILY WITH  WATER  ONLY  ON  AN  EMPTY  STOMACH  WAIT  30  MINUTES  BEFORE  EATING  OR  TAKING  OTHER  MEDS  Abbi Balderrama APRN - CNP         Past Medical History:   Diagnosis Date    [facility]     Current moderate episode of major depressive disorder without prior episode ([facility] 2019    Migraine     TIA (transient ischemic attack)        Past Surgical History:   Procedure Laterality Date    KNEE SURGERY  1983    both knees     MYRINGOTOMY AND TYMPANOSTOMY TUBE PLACEMENT Left 04/16/2013    Dr Massimo Juarez History   Problem Relation Age of Onset    Arthritis Mother        CareTeam (Including outside providers/suppliers regularly involved in providing care):   Patient Care Team:  HEVER Theodore CNP as PCP - General (Family Medicine)  HEVER Theodore CNP as PCP - [facility] Empaneled Provider    Wt Readings from Last 3 Encounters:   01/18/22 173 lb 4.8 oz (78.6 kg)   07/21/21 168 lb 12.8 oz (76.6 kg)   05/07/21 174 lb (78.9 kg)     No flowsheet data found. There is no height or weight on file to calculate BMI. Based upon direct observation of the patient, evaluation of cognition reveals recent and remote memory intact. States feeling better from covid, symptoms were \"bad\" for a day and all symptoms resolved     Patient's complete Health Risk Assessment and screening values have been reviewed and are found in Flowsheets. The following problems were reviewed today and where indicated follow up appointments were made and/or referrals ordered. Positive Risk Factor Screenings with Interventions:              General Health and ACP:  General  In general, how would you say your health is?: Good  In the past 7 days, have you experienced any of the following?  New or Increased Pain, New or Increased Fatigue, Loneliness, Social Isolation, Stress or Anger?: None of These  Do you get the social and emotional support that you need?: Yes  Do you have a Living Will?: Yes  Advance Directives     Power of [address] [place] [address] Will ACP-Advance Directive ACP-Power of     Not on File Not on File Not on File Not on File      General Health Risk Interventions:  · no further recommendation     Health Habits/Nutrition:  Health Habits/Nutrition  Do you exercise for at least 20 minutes 2-3 times per week?: Yes  Have you lost any weight without trying in the past 3 months?: No  Do you eat only one meal per day?: No  Have you seen the dentist within the past year?: (!) No     Health Habits/Nutrition Interventions:  · Dental exam overdue:  patient encouraged to make appointment with his/her dentist    Hearing/Vision:  No exam data present  Hearing/Vision  Do you or your family notice any trouble with your hearing that hasn't been managed with hearing aids?: (!) Yes  Do you have difficulty driving, watching TV, or doing any of your daily activities because of your eyesight?: No  Have you had an eye exam within the past year?: Yes  Hearing/Vision Interventions:  · states that during covid he had issues, but this has resolved since he had tubes in ears from specialist         Personalized Preventive Plan   Current Health Maintenance Status  Immunization History   Administered Date(s) Administered    COVID-19, [place], Primary or Immunocompromised, PF, 100mcg/0.5mL 02/23/2021, 03/23/2021    Influenza, Quadv, adjuvanted, 65 yrs +, IM, PF (Fluad) 10/12/2020    Influenza, Triv, inactivated, subunit, adjuvanted, IM (Fluad 65 yrs and older) 09/19/2019    Pneumococcal Conjugate 13-valent (Arthuro Nilsa) 09/19/2019    Pneumococcal Polysaccharide ([facility]) 10/12/2020        Health Maintenance   Topic Date Due    DTaP/Tdap/Td vaccine (1 - Tdap) Never done    Shingles Vaccine (1 of 2) Never done    COVID-19 Vaccine (3 - Booster for Moderna series) 08/23/2021    Flu vaccine (1) 09/01/2021    Depression Monitoring  04/22/2022    TSH testing  07/14/2022    Potassium monitoring  07/14/2022    Creatinine monitoring  07/14/2022    Lipid screen  08/18/2022    Colon cancer screen colonoscopy  05/05/2031    Pneumococcal 65+ years Vaccine  Completed    Hepatitis C screen  Completed    Hepatitis A vaccine  Aged Out    Hepatitis B vaccine  Aged Out    Hib vaccine  Aged Out    Meningococcal (ACWY) vaccine  Aged Out Recommendations for Preventive Services Due: see orders and patient instructions/AVS.  . Recommended screening schedule for the next 5-10 years is provided to the patient in written form: see Patient Instructions/AVS.    Rambo Adkins was seen today for medicare awv. Diagnoses and all orders for this visit:    Routine general medical examination at a health care facility    Essential hypertension  -     CBC Auto Differential; Future  -     Comprehensive Metabolic Panel, Fasting; Future  -     Lipid Panel; Future  -     TSH with Reflex; Future    Vitamin B12 deficiency neuropathy (HCC)  -     Vitamin B12 & Folate; Future    Screening for prostate cancer  -     PSA Screening; Future    Ankylosing spondylitis of lumbar region [facility])               Dorothy Guzman is a 78 y.o. male being evaluated by a Virtual Visit (video and audio) encounter to address concerns as mentioned above. A caregiver was present when appropriate. Due to this being a TeleHealth encounter (During [facility] public health emergency), evaluation of the following organ systems was limited: Vitals/Constitutional/EENT/Resp/CV/GI//MS/Neuro/Skin/Heme-Lymph-Imm. Pursuant to the emergency declaration under the [facility], [address] [place] [address] authority and the [employer] Act, this Virtual Visit was conducted with patient's (and/or legal guardian's) consent, to reduce the patient's risk of exposure to COVID-19 and provide necessary medical care. The patient (and/or legal guardian) has also been advised to contact this office for worsening conditions or problems, and seek emergency medical treatment and/or call 911 if deemed necessary. Patient identification was verified at the start of the visit: Yes    Services were provided through a video synchronous discussion virtually to substitute for in-person clinic visit.  Patient and provider were located at their individual homes.    --Ingrid Carlson, APRN - CNP on 1/26/2022 at 9:31 AM    An electronic signature was used to authenticate this note.

## 2022-01-26 NOTE — PATIENT INSTRUCTIONS
Personalized Preventive Plan for [employer] - 1/26/2022  Medicare offers a range of preventive health benefits. Some of the tests and screenings are paid in full while other may be subject to a deductible, co-insurance, and/or copay. Some of these benefits include a comprehensive review of your medical history including lifestyle, illnesses that may run in your family, and various assessments and screenings as appropriate. After reviewing your medical record and screening and assessments performed today your provider may have ordered immunizations, labs, imaging, and/or referrals for you. A list of these orders (if applicable) as well as your Preventive Care list are included within your After Visit Summary for your review. Other Preventive Recommendations:    · A preventive eye exam performed by an eye specialist is recommended every 1-2 years to screen for glaucoma; cataracts, macular degeneration, and other eye disorders. · A preventive dental visit is recommended every 6 months. · Try to get at least 150 minutes of exercise per week or 10,000 steps per day on a pedometer . · Order or download the FREE \"Exercise & Physical Activity: Your Everyday Guide\" from [employer]. Call 2-329.487.3206 or search [employer] online. · You need 1389-1857 mg of calcium and 7612-9752 IU of vitamin D per day. It is possible to meet your calcium requirement with diet alone, but a vitamin D supplement is usually necessary to meet this goal.  · When exposed to the sun, use a sunscreen that protects against both UVA and UVB radiation with an SPF of 30 or greater. Reapply every 2 to 3 hours or after sweating, drying off with a towel, or swimming. · Always wear a seat belt when traveling in a car. Always wear a helmet when riding a bicycle or motorcycle.

## 2022-05-24 DIAGNOSIS — E78.2 HYPERCHOLESTEROLEMIA WITH HYPERTRIGLYCERIDEMIA: ICD-10-CM

## 2022-05-24 RX ORDER — ATORVASTATIN CALCIUM 80 MG/1
80 TABLET, FILM COATED ORAL NIGHTLY
Qty: 90 TABLET | Refills: 3 | Status: SHIPPED | OUTPATIENT
Start: 2022-05-24

## 2022-05-24 RX ORDER — FENOFIBRATE 160 MG/1
160 TABLET ORAL DAILY
Qty: 90 TABLET | Refills: 3 | Status: SHIPPED | OUTPATIENT
Start: 2022-05-24

## 2022-05-25 DIAGNOSIS — E03.9 HYPOTHYROIDISM, UNSPECIFIED TYPE: ICD-10-CM

## 2022-05-25 RX ORDER — LEVOTHYROXINE SODIUM 0.03 MG/1
TABLET ORAL
Qty: 90 TABLET | Refills: 2 | Status: SHIPPED | OUTPATIENT
Start: 2022-05-25

## 2022-05-25 NOTE — TELEPHONE ENCOUNTER
Patient calling in stating he needs a refill on the Levothyroxine 25mcg sent to [facility].  1/26/2022   Next Appt. 7/27/2022   RX Pended

## 2022-07-20 ENCOUNTER — OFFICE VISIT (OUTPATIENT)
Dept: FAMILY MEDICINE CLINIC | Age: 67
End: 2022-07-20
Payer: MEDICARE

## 2022-07-20 ENCOUNTER — ANCILLARY PROCEDURE (OUTPATIENT)
Dept: FAMILY MEDICINE CLINIC | Age: 67
End: 2022-07-20
Payer: MEDICARE

## 2022-07-20 VITALS
HEART RATE: 68 BPM | TEMPERATURE: 97.6 F | BODY MASS INDEX: 24.39 KG/M2 | RESPIRATION RATE: 16 BRPM | OXYGEN SATURATION: 97 % | DIASTOLIC BLOOD PRESSURE: 77 MMHG | WEIGHT: 170 LBS | SYSTOLIC BLOOD PRESSURE: 147 MMHG

## 2022-07-20 DIAGNOSIS — M25.512 CHRONIC LEFT SHOULDER PAIN: Primary | ICD-10-CM

## 2022-07-20 DIAGNOSIS — M25.512 CHRONIC LEFT SHOULDER PAIN: ICD-10-CM

## 2022-07-20 DIAGNOSIS — G89.29 CHRONIC LEFT SHOULDER PAIN: ICD-10-CM

## 2022-07-20 DIAGNOSIS — G89.29 CHRONIC LEFT SHOULDER PAIN: Primary | ICD-10-CM

## 2022-07-20 DIAGNOSIS — S46.012A STRAIN OF LEFT ROTATOR CUFF CAPSULE, INITIAL ENCOUNTER: ICD-10-CM

## 2022-07-20 PROCEDURE — 73030 X-RAY EXAM OF SHOULDER: CPT

## 2022-07-20 PROCEDURE — 1123F ACP DISCUSS/DSCN MKR DOCD: CPT | Performed by: NURSE PRACTITIONER

## 2022-07-20 PROCEDURE — 99213 OFFICE O/P EST LOW 20 MIN: CPT | Performed by: NURSE PRACTITIONER

## 2022-07-20 PROCEDURE — G8427 DOCREV CUR MEDS BY ELIG CLIN: HCPCS | Performed by: NURSE PRACTITIONER

## 2022-07-20 PROCEDURE — 1036F TOBACCO NON-USER: CPT | Performed by: NURSE PRACTITIONER

## 2022-07-20 PROCEDURE — G8420 CALC BMI NORM PARAMETERS: HCPCS | Performed by: NURSE PRACTITIONER

## 2022-07-20 PROCEDURE — 73030 X-RAY EXAM OF SHOULDER: CPT | Performed by: NURSE PRACTITIONER

## 2022-07-20 PROCEDURE — 3017F COLORECTAL CA SCREEN DOC REV: CPT | Performed by: NURSE PRACTITIONER

## 2022-07-20 RX ORDER — METHYLPREDNISOLONE 4 MG/1
TABLET ORAL
Qty: 1 KIT | Refills: 0 | Status: SHIPPED | OUTPATIENT
Start: 2022-07-20 | End: 2022-07-26

## 2022-07-20 RX ORDER — TIZANIDINE HYDROCHLORIDE 2 MG/1
2 CAPSULE, GELATIN COATED ORAL 3 TIMES DAILY
Qty: 30 CAPSULE | Refills: 0 | Status: SHIPPED | OUTPATIENT
Start: 2022-07-20 | End: 2022-08-04 | Stop reason: ALTCHOICE

## 2022-07-20 ASSESSMENT — PATIENT HEALTH QUESTIONNAIRE - PHQ9
SUM OF ALL RESPONSES TO PHQ QUESTIONS 1-9: 0
SUM OF ALL RESPONSES TO PHQ QUESTIONS 1-9: 0
9. THOUGHTS THAT YOU WOULD BE BETTER OFF DEAD, OR OF HURTING YOURSELF: 0
4. FEELING TIRED OR HAVING LITTLE ENERGY: 0
SUM OF ALL RESPONSES TO PHQ QUESTIONS 1-9: 0
5. POOR APPETITE OR OVEREATING: 0
6. FEELING BAD ABOUT YOURSELF - OR THAT YOU ARE A FAILURE OR HAVE LET YOURSELF OR YOUR FAMILY DOWN: 0
3. TROUBLE FALLING OR STAYING ASLEEP: 0
7. TROUBLE CONCENTRATING ON THINGS, SUCH AS READING THE NEWSPAPER OR WATCHING TELEVISION: 0
SUM OF ALL RESPONSES TO PHQ9 QUESTIONS 1 & 2: 0
2. FEELING DOWN, DEPRESSED OR HOPELESS: 0
8. MOVING OR SPEAKING SO SLOWLY THAT OTHER PEOPLE COULD HAVE NOTICED. OR THE OPPOSITE, BEING SO FIGETY OR RESTLESS THAT YOU HAVE BEEN MOVING AROUND A LOT MORE THAN USUAL: 0
10. IF YOU CHECKED OFF ANY PROBLEMS, HOW DIFFICULT HAVE THESE PROBLEMS MADE IT FOR YOU TO DO YOUR WORK, TAKE CARE OF THINGS AT HOME, OR GET ALONG WITH OTHER PEOPLE: 0
1. LITTLE INTEREST OR PLEASURE IN DOING THINGS: 0
SUM OF ALL RESPONSES TO PHQ QUESTIONS 1-9: 0

## 2022-07-20 NOTE — PROGRESS NOTES
[facility] [address] [facility] [address]  Dept: [phone]  Dept Fax: [phone]    Visit type: Established patient    Reason for Visit: Joint Pain (Shoulder pain / going on for a couple months / not getting better ) and Health Maintenance (Vaccines )         Assessment and Plan       1. Chronic left shoulder pain  -     XR SHOULDER LEFT (MIN 2 VIEWS); Future  -     tiZANidine (ZANAFLEX) 2 MG capsule; Take 1 capsule by mouth in the morning and 1 capsule at noon and 1 capsule before bedtime. , Disp-30 capsule, R-0Normal  -     methylPREDNISolone (MEDROL DOSEPACK) 4 MG tablet; Take by mouth., Disp-1 kit, R-0Normal  2. Strain of left rotator cuff capsule, initial encounter  -     XR SHOULDER LEFT (MIN 2 VIEWS); Future  -     tiZANidine (ZANAFLEX) 2 MG capsule; Take 1 capsule by mouth in the morning and 1 capsule at noon and 1 capsule before bedtime. , Disp-30 capsule, R-0Normal  -     methylPREDNISolone (MEDROL DOSEPACK) 4 MG tablet; Take by mouth., Disp-1 kit, R-0Normal    Stretches given, start steroid pack, rotate ice and moist heat and can also use muscle relaxers   Return for move apt from 7-27 to next month . Subjective       Left shoulder pain  Onset 2 months  ago  Thinks he was picking up things he thinks were too heavy   No loss of  or numbness or tingling to hand          Review of Systems   Musculoskeletal:  Positive for arthralgias and myalgias. Negative for neck pain and neck stiffness. Skin: Negative. Neurological:  Negative for weakness and numbness.       Allergies   Allergen Reactions    Shellfish-Derived Products        Outpatient Medications Prior to Visit   Medication Sig Dispense Refill    levothyroxine (EUTHYROX) 25 MCG tablet TAKE 1 TABLET BY MOUTH ONCE DAILY WITH  WATER  ONLY  ON  AN  EMPTY  STOMACH  WAIT  30  MINUTES  BEFORE  EATING  OR  TAKING  OTHER  MEDS 90 tablet 2    fenofibrate (TRIGLIDE) 160 MG tablet Take 1 tablet by mouth daily 90 tablet 3    atorvastatin (LIPITOR) 80 MG tablet Take 1 tablet by mouth nightly 90 tablet 3    budesonide (PULMICORT FLEXHALER) 90 MCG/ACT AEPB inhaler Inhale 2 puffs into the lungs 2 times daily 1 each 0    amLODIPine (NORVASC) 10 MG tablet Take 1 tablet by mouth once daily 90 tablet 2    omega-3 acid ethyl esters (LOVAZA) 1 g capsule Take 2 capsules by mouth 2 times daily 60 capsule 5    aspirin 81 MG tablet Take 81 mg by mouth daily       No facility-administered medications prior to visit. Past Medical History:   Diagnosis Date    [facility]     [facility] of [facility] 9[facility]19/[facility]    Migraine     TIA (transient ischemic attack)         Social History     Tobacco Use    Smoking status: Never    Smokeless tobacco: Never   Substance Use Topics    Alcohol use: Yes     Comment: social        Past Surgical History:   Procedure Laterality Date    KNEE SURGERY  1983    both knees     MYRINGOTOMY AND TYMPANOSTOMY TUBE PLACEMENT Left 04/16/2013    Dr Davon Bronson SINUS SURGERY         Family History   Problem Relation Age of Onset    Arthritis Mother        Objective       BP (!) 147/77   Pulse 68   Temp 97.6 °F (36.4 °C) (Temporal)   Resp 16   Wt 170 lb (77.1 kg)   SpO2 97%   BMI 24.39 kg/m²   Physical Exam  Constitutional:       General: He is not in acute distress. Appearance: He is well-developed. Interventions: He is not intubated. HENT:      Head: Normocephalic and atraumatic. Right Ear: External ear normal.      Left Ear: External ear normal.   Eyes:      General: Lids are normal.      Conjunctiva/sclera: Conjunctivae normal.   Pulmonary:      Effort: No tachypnea, bradypnea, prolonged expiration, respiratory distress or retractions. He is not intubated. Musculoskeletal:      Left shoulder: Swelling, tenderness and bony tenderness present. No crepitus. Normal strength. Normal pulse. Cervical back: Normal range of motion. Comments: Negative drop arm test    Skin:     Coloration: Skin is not pale. Findings: No erythema or rash. Neurological:      Mental Status: He is alert and oriented to person, place, and time. Psychiatric:         Attention and Perception: Attention and perception normal.         Mood and Affect: Mood and affect normal.         Speech: Speech normal.         Behavior: Behavior normal. Behavior is cooperative. Thought Content:  Thought content normal.         Cognition and Memory: Cognition and memory normal.         Judgment: Judgment normal.       Data Reviewed and Summarized       Labs:     Imaging/Testing:        Sherryle Grieves, APRN - CNP

## 2022-07-21 ENCOUNTER — TELEPHONE (OUTPATIENT)
Dept: FAMILY MEDICINE CLINIC | Age: 67
End: 2022-07-21

## 2022-07-21 NOTE — TELEPHONE ENCOUNTER
Dr. Cecy Begum note state that they were placed in January 18 2022     Patient had said during his visit that they were out of them and wanted external referral to get placement     Called  of Dr. Rosamaria Ortega and left note for them to return our call with info if tubes were available. He had hearing aids in today and was not seen for ENT issue, but for shoulder concern so I did not look in his ears to see if he has tubes or not.

## 2022-07-22 ENCOUNTER — TELEPHONE (OUTPATIENT)
Dept: ENT CLINIC | Age: 67
End: 2022-07-22

## 2022-07-22 NOTE — TELEPHONE ENCOUNTER
Patient was scheduled for an appt on 05/27/2022 that was cancelled d/t \"We do not have any ventilation tubes at the moment. Will call pt when we receive them\"  Patient is still waiting to hear back about this.   Please advise

## 2022-08-04 ENCOUNTER — OFFICE VISIT (OUTPATIENT)
Dept: FAMILY MEDICINE CLINIC | Age: 67
End: 2022-08-04
Payer: MEDICARE

## 2022-08-04 VITALS
DIASTOLIC BLOOD PRESSURE: 76 MMHG | WEIGHT: 169 LBS | OXYGEN SATURATION: 97 % | BODY MASS INDEX: 24.25 KG/M2 | SYSTOLIC BLOOD PRESSURE: 138 MMHG | RESPIRATION RATE: 16 BRPM | TEMPERATURE: 97 F | HEART RATE: 65 BPM

## 2022-08-04 DIAGNOSIS — S46.012A STRAIN OF LEFT ROTATOR CUFF CAPSULE, INITIAL ENCOUNTER: ICD-10-CM

## 2022-08-04 DIAGNOSIS — G89.29 CHRONIC LEFT SHOULDER PAIN: Primary | ICD-10-CM

## 2022-08-04 DIAGNOSIS — G63 VITAMIN B12 DEFICIENCY NEUROPATHY (HCC): ICD-10-CM

## 2022-08-04 DIAGNOSIS — M25.512 CHRONIC LEFT SHOULDER PAIN: Primary | ICD-10-CM

## 2022-08-04 DIAGNOSIS — M45.6 ANKYLOSING SPONDYLITIS OF LUMBAR REGION (HCC): ICD-10-CM

## 2022-08-04 DIAGNOSIS — I10 ESSENTIAL HYPERTENSION: ICD-10-CM

## 2022-08-04 DIAGNOSIS — E53.8 VITAMIN B12 DEFICIENCY NEUROPATHY (HCC): ICD-10-CM

## 2022-08-04 PROCEDURE — 1036F TOBACCO NON-USER: CPT | Performed by: NURSE PRACTITIONER

## 2022-08-04 PROCEDURE — 1123F ACP DISCUSS/DSCN MKR DOCD: CPT | Performed by: NURSE PRACTITIONER

## 2022-08-04 PROCEDURE — G8420 CALC BMI NORM PARAMETERS: HCPCS | Performed by: NURSE PRACTITIONER

## 2022-08-04 PROCEDURE — 3017F COLORECTAL CA SCREEN DOC REV: CPT | Performed by: NURSE PRACTITIONER

## 2022-08-04 PROCEDURE — G8427 DOCREV CUR MEDS BY ELIG CLIN: HCPCS | Performed by: NURSE PRACTITIONER

## 2022-08-04 PROCEDURE — 99214 OFFICE O/P EST MOD 30 MIN: CPT | Performed by: NURSE PRACTITIONER

## 2022-08-04 SDOH — ECONOMIC STABILITY: FOOD INSECURITY: WITHIN THE PAST 12 MONTHS, THE FOOD YOU BOUGHT JUST DIDN'T LAST AND YOU DIDN'T HAVE MONEY TO GET MORE.: NEVER TRUE

## 2022-08-04 SDOH — ECONOMIC STABILITY: FOOD INSECURITY: WITHIN THE PAST 12 MONTHS, YOU WORRIED THAT YOUR FOOD WOULD RUN OUT BEFORE YOU GOT MONEY TO BUY MORE.: NEVER TRUE

## 2022-08-04 ASSESSMENT — SOCIAL DETERMINANTS OF HEALTH (SDOH): HOW HARD IS IT FOR YOU TO PAY FOR THE VERY BASICS LIKE FOOD, HOUSING, MEDICAL CARE, AND HEATING?: NOT HARD AT ALL

## 2022-08-04 NOTE — PROGRESS NOTES
Sherryle Moder [address] [facility] [place] [address]  Dept: [phone]  Dept Fax: [phone]    Visit type: Established patient    Reason for Visit: Shoulder Pain (Left- 3 week follow up--Still having pain. Zanaflex did not help- nightmares) and Hearing Problem (Left ear)         Assessment and Plan       1. Chronic left shoulder pain  -     External Referral To Physical Therapy  2. Vitamin B12 deficiency neuropathy ([facility]  3. Ankylosing spondylitis of lumbar region ([facility]  4. Strain of left rotator cuff capsule, initial encounter  -     External Referral To Physical Therapy  5. Essential hypertension    Will start PT at this time      Return for change fu apt to 6 weeks from now . Subjective       Started muscle relaxer- Was given nightmares  Has been doing exercise which helped some   Can put a shirt on now  10-15 % pain better patient states   Is willing to do PT at this point    HTN  Onset years ago  Stable with norvasc        Review of Systems   Constitutional:  Negative for activity change, appetite change and fever. HENT:  Negative for congestion, ear pain and rhinorrhea. Eyes:  Negative for discharge and visual disturbance. Respiratory:  Negative for cough, chest tightness and shortness of breath. Cardiovascular:  Negative for chest pain and palpitations. Gastrointestinal:  Negative for abdominal pain, constipation, diarrhea and vomiting. Genitourinary:  Negative for difficulty urinating and hematuria. Musculoskeletal:  Positive for arthralgias and myalgias. Negative for neck pain and neck stiffness. Skin: Negative. Negative for rash. Neurological:  Negative for dizziness, weakness, numbness and headaches. Psychiatric/Behavioral:  The patient is not nervous/anxious.        Allergies   Allergen Reactions    Shellfish-Derived Products        Outpatient Medications Prior to Visit   Medication Sig Dispense Refill    levothyroxine (EUTHYROX) 25 MCG tablet TAKE 1 TABLET BY MOUTH ONCE DAILY WITH  WATER  ONLY  ON  AN  EMPTY  STOMACH  WAIT  30  MINUTES  BEFORE  EATING  OR  TAKING  OTHER  MEDS 90 tablet 2    fenofibrate (TRIGLIDE) 160 MG tablet Take 1 tablet by mouth daily 90 tablet 3    atorvastatin (LIPITOR) 80 MG tablet Take 1 tablet by mouth nightly 90 tablet 3    budesonide (PULMICORT FLEXHALER) 90 MCG/ACT AEPB inhaler Inhale 2 puffs into the lungs 2 times daily 1 each 0    amLODIPine (NORVASC) 10 MG tablet Take 1 tablet by mouth once daily 90 tablet 2    omega-3 acid ethyl esters (LOVAZA) 1 g capsule Take 2 capsules by mouth 2 times daily 60 capsule 5    aspirin 81 MG tablet Take 81 mg by mouth daily      tiZANidine (ZANAFLEX) 2 MG capsule Take 1 capsule by mouth in the morning and 1 capsule at noon and 1 capsule before bedtime. (Patient not taking: Reported on 8/4/2022) 30 capsule 0     No facility-administered medications prior to visit. Past Medical History:   Diagnosis Date    [facility]     Current moderate episode of major depressive disorder without prior episode ([facility] 9/19/2019    Migraine     TIA (transient ischemic attack)         Social History     Tobacco Use    Smoking status: Never    Smokeless tobacco: Never   Substance Use Topics    Alcohol use: Yes     Comment: social        Past Surgical History:   Procedure Laterality Date    KNEE SURGERY  1983    both knees     MYRINGOTOMY AND TYMPANOSTOMY TUBE PLACEMENT Left 04/16/2013    Dr Tasia Coles SINUS SURGERY         Family History   Problem Relation Age of Onset    Arthritis Mother        Objective       /76 (Site: Left Upper Arm, Position: Sitting, Cuff Size: Large Adult)   Pulse 65   Temp 97 °F (36.1 °C) (Temporal)   Resp 16   Wt 169 lb (76.7 kg)   SpO2 97%   BMI 24.25 kg/m²   Physical Exam  Vitals reviewed. Constitutional:       General: He is not in acute distress. Appearance: He is well-developed.       Interventions: He is not intubated. HENT:      Head: Normocephalic and atraumatic. Right Ear: External ear normal.      Left Ear: External ear normal.   Eyes:      General: Lids are normal.      Conjunctiva/sclera: Conjunctivae normal.   Neck:      Thyroid: No thyromegaly. Trachea: Trachea normal.   Cardiovascular:      Rate and Rhythm: Normal rate and regular rhythm. Heart sounds: Normal heart sounds. No murmur heard. No friction rub. No gallop. Pulmonary:      Effort: Pulmonary effort is normal. No tachypnea, bradypnea, prolonged expiration, respiratory distress or retractions. He is not intubated. Breath sounds: Normal breath sounds. Abdominal:      General: Bowel sounds are normal.      Palpations: Abdomen is soft. Tenderness: There is no abdominal tenderness. Musculoskeletal:         General: Normal range of motion. Left shoulder: Swelling, tenderness and bony tenderness present. No crepitus. Normal strength. Normal pulse. Cervical back: Normal range of motion and neck supple. No spinous process tenderness. Comments: Negative drop arm test    Skin:     General: Skin is warm and dry. Coloration: Skin is not pale. Findings: No erythema or rash. Neurological:      Mental Status: He is alert and oriented to person, place, and time. Psychiatric:         Attention and Perception: Attention and perception normal.         Mood and Affect: Mood and affect normal.         Speech: Speech normal.         Behavior: Behavior normal. Behavior is cooperative. Thought Content:  Thought content normal.         Cognition and Memory: Cognition and memory normal.         Judgment: Judgment normal.       Data Reviewed and Summarized       Labs:     Imaging/Testing:        Ashly Vick, APRN - CNP

## 2022-08-05 NOTE — TELEPHONE ENCOUNTER
I called and let Roro Mason know that we are still waiting on the tubes and that they have been ordered. He wanted to know if there is another ENT who has tubes. He was upset that he has had to wait so long.  I apologized, but tried to explain to him that we are waiting on the supplier. ( I had received a call to contact to him as why he is still waiting for tubes.)

## 2022-08-10 ASSESSMENT — ENCOUNTER SYMPTOMS
EYE DISCHARGE: 0
RHINORRHEA: 0
DIARRHEA: 0
VOMITING: 0
COUGH: 0
CHEST TIGHTNESS: 0
ABDOMINAL PAIN: 0
CONSTIPATION: 0
SHORTNESS OF BREATH: 0

## 2022-08-19 DIAGNOSIS — I10 ESSENTIAL HYPERTENSION: ICD-10-CM

## 2022-08-21 RX ORDER — AMLODIPINE BESYLATE 10 MG/1
TABLET ORAL
Qty: 90 TABLET | Refills: 2 | Status: SHIPPED | OUTPATIENT
Start: 2022-08-21 | End: 2022-08-23 | Stop reason: SDUPTHER

## 2022-08-22 ENCOUNTER — TELEPHONE (OUTPATIENT)
Dept: FAMILY MEDICINE CLINIC | Age: 67
End: 2022-08-22

## 2022-08-22 DIAGNOSIS — I10 ESSENTIAL HYPERTENSION: ICD-10-CM

## 2022-08-22 NOTE — TELEPHONE ENCOUNTER
Pt is wanting to  meds between now and 1130- while he is in [facility] [place] today-

## 2022-08-23 RX ORDER — AMLODIPINE BESYLATE 10 MG/1
TABLET ORAL
Qty: 90 TABLET | Refills: 2 | Status: SHIPPED | OUTPATIENT
Start: 2022-08-23

## 2022-08-25 ENCOUNTER — OFFICE VISIT (OUTPATIENT)
Dept: FAMILY MEDICINE CLINIC | Age: 67
End: 2022-08-25
Payer: MEDICARE

## 2022-08-25 VITALS
RESPIRATION RATE: 16 BRPM | DIASTOLIC BLOOD PRESSURE: 74 MMHG | SYSTOLIC BLOOD PRESSURE: 150 MMHG | BODY MASS INDEX: 24.25 KG/M2 | TEMPERATURE: 97 F | HEART RATE: 64 BPM | WEIGHT: 169 LBS | OXYGEN SATURATION: 97 %

## 2022-08-25 DIAGNOSIS — E78.2 HYPERCHOLESTEROLEMIA WITH HYPERTRIGLYCERIDEMIA: ICD-10-CM

## 2022-08-25 DIAGNOSIS — M25.512 CHRONIC LEFT SHOULDER PAIN: Primary | ICD-10-CM

## 2022-08-25 DIAGNOSIS — E03.9 HYPOTHYROIDISM, UNSPECIFIED TYPE: ICD-10-CM

## 2022-08-25 DIAGNOSIS — G62.9 PERIPHERAL POLYNEUROPATHY: ICD-10-CM

## 2022-08-25 DIAGNOSIS — I10 ESSENTIAL HYPERTENSION: ICD-10-CM

## 2022-08-25 DIAGNOSIS — S46.012S ROTATOR CUFF STRAIN, LEFT, SEQUELA: ICD-10-CM

## 2022-08-25 DIAGNOSIS — G89.29 CHRONIC LEFT SHOULDER PAIN: Primary | ICD-10-CM

## 2022-08-25 DIAGNOSIS — R07.89 INTERMITTENT LEFT-SIDED CHEST PAIN: ICD-10-CM

## 2022-08-25 PROCEDURE — 1036F TOBACCO NON-USER: CPT | Performed by: NURSE PRACTITIONER

## 2022-08-25 PROCEDURE — G8427 DOCREV CUR MEDS BY ELIG CLIN: HCPCS | Performed by: NURSE PRACTITIONER

## 2022-08-25 PROCEDURE — G8420 CALC BMI NORM PARAMETERS: HCPCS | Performed by: NURSE PRACTITIONER

## 2022-08-25 PROCEDURE — 99214 OFFICE O/P EST MOD 30 MIN: CPT | Performed by: NURSE PRACTITIONER

## 2022-08-25 PROCEDURE — 3017F COLORECTAL CA SCREEN DOC REV: CPT | Performed by: NURSE PRACTITIONER

## 2022-08-25 PROCEDURE — 1123F ACP DISCUSS/DSCN MKR DOCD: CPT | Performed by: NURSE PRACTITIONER

## 2022-08-25 ASSESSMENT — PATIENT HEALTH QUESTIONNAIRE - PHQ9
4. FEELING TIRED OR HAVING LITTLE ENERGY: 0
5. POOR APPETITE OR OVEREATING: 0
SUM OF ALL RESPONSES TO PHQ9 QUESTIONS 1 & 2: 0
10. IF YOU CHECKED OFF ANY PROBLEMS, HOW DIFFICULT HAVE THESE PROBLEMS MADE IT FOR YOU TO DO YOUR WORK, TAKE CARE OF THINGS AT HOME, OR GET ALONG WITH OTHER PEOPLE: 0
7. TROUBLE CONCENTRATING ON THINGS, SUCH AS READING THE NEWSPAPER OR WATCHING TELEVISION: 0
9. THOUGHTS THAT YOU WOULD BE BETTER OFF DEAD, OR OF HURTING YOURSELF: 0
1. LITTLE INTEREST OR PLEASURE IN DOING THINGS: 0
SUM OF ALL RESPONSES TO PHQ QUESTIONS 1-9: 0
8. MOVING OR SPEAKING SO SLOWLY THAT OTHER PEOPLE COULD HAVE NOTICED. OR THE OPPOSITE, BEING SO FIGETY OR RESTLESS THAT YOU HAVE BEEN MOVING AROUND A LOT MORE THAN USUAL: 0
SUM OF ALL RESPONSES TO PHQ QUESTIONS 1-9: 0
SUM OF ALL RESPONSES TO PHQ QUESTIONS 1-9: 0
6. FEELING BAD ABOUT YOURSELF - OR THAT YOU ARE A FAILURE OR HAVE LET YOURSELF OR YOUR FAMILY DOWN: 0
SUM OF ALL RESPONSES TO PHQ QUESTIONS 1-9: 0
3. TROUBLE FALLING OR STAYING ASLEEP: 0
2. FEELING DOWN, DEPRESSED OR HOPELESS: 0

## 2022-08-25 NOTE — PROGRESS NOTES
Earline [facility] [address] [facility] [address]  Dept: [phone]  Dept Fax: [phone]    Visit type: Established patient    Reason for Visit: Hypertension (6mo fu ), Health Maintenance (Vaccines ), and Shoulder Pain (L shoulder )         Assessment and Plan       1. Chronic left shoulder pain  -     Handicap Placard MISC; Starting Thu 8/25/2022, Disp-1 each, R-0, PrintExpires 5 year from date of prescription  2. Rotator cuff strain, left, sequela  3. Essential hypertension  4. Hypothyroidism, unspecified type  5. Hypercholesterolemia with hypertriglyceridemia  6. Peripheral polyneuropathy  -     Handicap Placard MISC; Starting Thu 8/25/2022, Disp-1 each, R-0, PrintExpires 5 year from date of prescription  7. Intermittent left-sided chest pain  -     Echocardiogram complete; Future  -     Stress test, lexiscan; Future      Return for labs today, then fu 6 weeks for shoulder pain . 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 HENT:  Negative for congestion, ear pain and rhinorrhea. Eyes:  Negative for discharge and visual disturbance.    Respiratory:  Negative for cough, chest tightness and shortness of breath. Wheezing: intermittent. Cardiovascular:  Positive for chest pain. Negative for palpitations. Gastrointestinal:  Negative for abdominal pain, constipation, diarrhea and vomiting. Genitourinary:  Negative for difficulty urinating and hematuria. Musculoskeletal:  Positive for arthralgias and myalgias. Negative for neck pain and neck stiffness. Skin: Negative. Negative for rash. Neurological:  Negative for dizziness, weakness, numbness and headaches. Psychiatric/Behavioral:  The patient is not nervous/anxious. Allergies   Allergen Reactions    Shellfish-Derived Products        Outpatient Medications Prior to Visit   Medication Sig Dispense Refill    amLODIPine (NORVASC) 10 MG tablet Take 1 tablet by mouth once daily 90 tablet 2    levothyroxine (EUTHYROX) 25 MCG tablet TAKE 1 TABLET BY MOUTH ONCE DAILY WITH  WATER  ONLY  ON  AN  EMPTY  STOMACH  WAIT  30  MINUTES  BEFORE  EATING  OR  TAKING  OTHER  MEDS 90 tablet 2    fenofibrate (TRIGLIDE) 160 MG tablet Take 1 tablet by mouth daily 90 tablet 3    atorvastatin (LIPITOR) 80 MG tablet Take 1 tablet by mouth nightly 90 tablet 3    budesonide (PULMICORT FLEXHALER) 90 MCG/ACT AEPB inhaler Inhale 2 puffs into the lungs 2 times daily 1 each 0    omega-3 acid ethyl esters (LOVAZA) 1 g capsule Take 2 capsules by mouth 2 times daily 60 capsule 5    aspirin 81 MG tablet Take 81 mg by mouth daily       No facility-administered medications prior to visit.         Past Medical History:   Diagnosis Date    [facility]yl[facility] sp[facility]yl[facility] ([facility]     Current moderate episode of major depressive disorder without prior episode ([facility] 9/19/2019    Migraine     TIA (transient ischemic attack)         Social History     Tobacco Use    Smoking status: Never    Smokeless tobacco: Never   Substance Use Topics    Alcohol use: Yes     Comment: social        Past Surgical History:   Procedure Laterality Date    KNEE SURGERY  1983    both knees     MYRINGOTOMY

## 2022-09-05 ASSESSMENT — ENCOUNTER SYMPTOMS
ABDOMINAL PAIN: 0
CHEST TIGHTNESS: 0
CONSTIPATION: 0
EYE DISCHARGE: 0
VOMITING: 0
DIARRHEA: 0
SHORTNESS OF BREATH: 0
RHINORRHEA: 0
COUGH: 0

## 2022-10-06 ENCOUNTER — OFFICE VISIT (OUTPATIENT)
Dept: FAMILY MEDICINE CLINIC | Age: 67
End: 2022-10-06
Payer: MEDICARE

## 2022-10-06 VITALS
TEMPERATURE: 97.5 F | RESPIRATION RATE: 16 BRPM | OXYGEN SATURATION: 97 % | HEART RATE: 58 BPM | BODY MASS INDEX: 24.28 KG/M2 | DIASTOLIC BLOOD PRESSURE: 80 MMHG | SYSTOLIC BLOOD PRESSURE: 126 MMHG | WEIGHT: 169.2 LBS

## 2022-10-06 DIAGNOSIS — G89.29 CHRONIC LEFT SHOULDER PAIN: ICD-10-CM

## 2022-10-06 DIAGNOSIS — M25.512 CHRONIC LEFT SHOULDER PAIN: ICD-10-CM

## 2022-10-06 DIAGNOSIS — S46.012S ROTATOR CUFF STRAIN, LEFT, SEQUELA: Primary | ICD-10-CM

## 2022-10-06 PROCEDURE — 1036F TOBACCO NON-USER: CPT | Performed by: NURSE PRACTITIONER

## 2022-10-06 PROCEDURE — G8420 CALC BMI NORM PARAMETERS: HCPCS | Performed by: NURSE PRACTITIONER

## 2022-10-06 PROCEDURE — 3017F COLORECTAL CA SCREEN DOC REV: CPT | Performed by: NURSE PRACTITIONER

## 2022-10-06 PROCEDURE — 1123F ACP DISCUSS/DSCN MKR DOCD: CPT | Performed by: NURSE PRACTITIONER

## 2022-10-06 PROCEDURE — G8484 FLU IMMUNIZE NO ADMIN: HCPCS | Performed by: NURSE PRACTITIONER

## 2022-10-06 PROCEDURE — 99213 OFFICE O/P EST LOW 20 MIN: CPT | Performed by: NURSE PRACTITIONER

## 2022-10-06 PROCEDURE — G8427 DOCREV CUR MEDS BY ELIG CLIN: HCPCS | Performed by: NURSE PRACTITIONER

## 2022-10-06 RX ORDER — GABAPENTIN 100 MG/1
100 CAPSULE ORAL NIGHTLY
Qty: 90 CAPSULE | Refills: 3 | Status: SHIPPED | OUTPATIENT
Start: 2022-10-06 | End: 2023-10-01

## 2022-10-06 RX ORDER — IBUPROFEN 800 MG/1
800 TABLET ORAL EVERY 8 HOURS PRN
Qty: 30 TABLET | Refills: 1 | Status: SHIPPED | OUTPATIENT
Start: 2022-10-06

## 2022-10-06 ASSESSMENT — PATIENT HEALTH QUESTIONNAIRE - PHQ9
SUM OF ALL RESPONSES TO PHQ QUESTIONS 1-9: 0
SUM OF ALL RESPONSES TO PHQ9 QUESTIONS 1 & 2: 0
SUM OF ALL RESPONSES TO PHQ QUESTIONS 1-9: 0
2. FEELING DOWN, DEPRESSED OR HOPELESS: 0
1. LITTLE INTEREST OR PLEASURE IN DOING THINGS: 0

## 2022-10-06 NOTE — PROGRESS NOTES
Angela Harris [address] [facility]. [facility] [address]  Dept: [phone]  Dept Fax: [phone]    Visit type: Established patient    Reason for Visit: Shoulder Pain (Left shoulder pain ), Neck Pain (Radiating from left shoulder ), and Health Maintenance (Vaccines )         Assessment and Plan       1. Rotator cuff strain, left, sequela  -     MRI SHOULDER LEFT WO CONTRAST; Future  -     Amb External Referral To Orthopedic Surgery  -     ibuprofen (IBU) 800 MG tablet; Take 1 tablet by mouth every 8 hours as needed for Pain Take with food. , Disp-30 tablet, R-1Normal  -     gabapentin (NEURONTIN) 100 MG capsule; Take 1 capsule by mouth nightly for 360 days. , Disp-90 capsule, R-3Normal  2. Chronic left shoulder pain  -     MRI SHOULDER LEFT WO CONTRAST; Future  -     Amb External Referral To Orthopedic Surgery  -     ibuprofen (IBU) 800 MG tablet; Take 1 tablet by mouth every 8 hours as needed for Pain Take with food. , Disp-30 tablet, R-1Normal  -     gabapentin (NEURONTIN) 100 MG capsule; Take 1 capsule by mouth nightly for 360 days. , Disp-90 capsule, R-3Normal      No follow-ups on file. Subjective       Hot shower, muscle relaxer has helped   But is getting worse and now getting headaches  Could not do PT because too painful   Feels weaker- can't shut truck door   Night can not sleep        Review of Systems   Constitutional:  Negative for activity change, appetite change and fever. HENT:  Negative for congestion, ear pain and rhinorrhea. Eyes:  Negative for discharge and visual disturbance. Respiratory:  Negative for cough, chest tightness and shortness of breath. Wheezing: intermittent. Cardiovascular:  Negative for chest pain and palpitations. Gastrointestinal:  Negative for abdominal pain, constipation, diarrhea and vomiting. Genitourinary:  Negative for difficulty urinating and hematuria.    Musculoskeletal:  Positive for arthralgias, myalgias and neck pain. Negative for neck stiffness. Skin: Negative. Negative for rash. Neurological:  Negative for dizziness, weakness, numbness and headaches. Psychiatric/Behavioral:  The patient is not nervous/anxious. Allergies   Allergen Reactions    Shellfish-Derived Products        Outpatient Medications Prior to Visit   Medication Sig Dispense Refill    Handicap Placard MISC by Does not apply route Expires 5 year from date of prescription 1 each 0    amLODIPine (NORVASC) 10 MG tablet Take 1 tablet by mouth once daily 90 tablet 2    levothyroxine (EUTHYROX) 25 MCG tablet TAKE 1 TABLET BY MOUTH ONCE DAILY WITH  WATER  ONLY  ON  AN  EMPTY  STOMACH  WAIT  30  MINUTES  BEFORE  EATING  OR  TAKING  OTHER  MEDS 90 tablet 2    fenofibrate (TRIGLIDE) 160 MG tablet Take 1 tablet by mouth daily 90 tablet 3    atorvastatin (LIPITOR) 80 MG tablet Take 1 tablet by mouth nightly 90 tablet 3    budesonide (PULMICORT FLEXHALER) 90 MCG/ACT AEPB inhaler Inhale 2 puffs into the lungs 2 times daily 1 each 0    omega-3 acid ethyl esters (LOVAZA) 1 g capsule Take 2 capsules by mouth 2 times daily 60 capsule 5    aspirin 81 MG tablet Take 81 mg by mouth daily       No facility-administered medications prior to visit.         Past Medical History:   Diagnosis Date    [facility] ([facility]     Current moderate episode of major depressive disorder without prior episode ([facility] 9/19/2019    Migraine     TIA (transient ischemic attack)         Social History     Tobacco Use    Smoking status: Never    Smokeless tobacco: Never   Substance Use Topics    Alcohol use: Yes     Comment: social        Past Surgical History:   Procedure Laterality Date    KNEE SURGERY  1983    both knees     MYRINGOTOMY AND TYMPANOSTOMY TUBE PLACEMENT Left 04/16/2013    Dr Mary Kay Forman SINUS SURGERY         Family History   Problem Relation Age of Onset    Arthritis Mother        Objective       /80   Pulse 58   Temp 97.5 °F (36.4 °C) (Temporal)   Resp 16   Wt 169 lb 3.2 oz (76.7 kg)   SpO2 97%   BMI 24.28 kg/m²   Physical Exam  Vitals reviewed. Constitutional:       General: He is not in acute distress. Appearance: He is well-developed. Interventions: He is not intubated. HENT:      Head: Normocephalic and atraumatic. Right Ear: External ear normal.      Left Ear: External ear normal.   Eyes:      General: Lids are normal.      Conjunctiva/sclera: Conjunctivae normal.   Neck:      Thyroid: No thyromegaly. Trachea: Trachea normal.   Cardiovascular:      Rate and Rhythm: Normal rate and regular rhythm. Heart sounds: Normal heart sounds. No murmur heard. No friction rub. No gallop. Pulmonary:      Effort: Pulmonary effort is normal. No tachypnea, bradypnea, prolonged expiration, respiratory distress or retractions. He is not intubated. Breath sounds: Normal breath sounds. Abdominal:      General: Bowel sounds are normal.      Palpations: Abdomen is soft. Tenderness: There is no abdominal tenderness. Musculoskeletal:         General: Normal range of motion. Left shoulder: Swelling, tenderness and bony tenderness present. No crepitus. Normal strength. Normal pulse. Cervical back: Normal range of motion and neck supple. No spinous process tenderness. Comments: Negative drop arm test    Skin:     General: Skin is warm and dry. Coloration: Skin is not pale. Findings: No erythema or rash. Neurological:      Mental Status: He is alert and oriented to person, place, and time. Psychiatric:         Attention and Perception: Attention and perception normal.         Mood and Affect: Mood and affect normal.         Speech: Speech normal.         Behavior: Behavior normal. Behavior is cooperative. Thought Content:  Thought content normal.         Cognition and Memory: Cognition and memory normal.         Judgment: Judgment normal.       Data Reviewed and Summarized       Labs: Imaging/Testing:        Patti Rueda, HEVER - CNP

## 2022-10-09 ASSESSMENT — ENCOUNTER SYMPTOMS
RHINORRHEA: 0
EYE DISCHARGE: 0
VOMITING: 0
CONSTIPATION: 0
DIARRHEA: 0
COUGH: 0
CHEST TIGHTNESS: 0
ABDOMINAL PAIN: 0
SHORTNESS OF BREATH: 0

## 2022-10-29 ENCOUNTER — HOSPITAL ENCOUNTER (OUTPATIENT)
Dept: MRI IMAGING | Age: 67
Discharge: HOME | End: 2022-10-29
Payer: MEDICARE

## 2022-10-29 DIAGNOSIS — S46.012S ROTATOR CUFF STRAIN, LEFT, SEQUELA: ICD-10-CM

## 2022-10-29 DIAGNOSIS — G89.29 CHRONIC LEFT SHOULDER PAIN: ICD-10-CM

## 2022-10-29 DIAGNOSIS — M25.512 CHRONIC LEFT SHOULDER PAIN: ICD-10-CM

## 2022-10-29 PROCEDURE — G1010 CDSM STANSON: HCPCS

## 2023-01-12 ENCOUNTER — PROCEDURE VISIT (OUTPATIENT)
Dept: ENT CLINIC | Age: 68
End: 2023-01-12

## 2023-01-12 ENCOUNTER — OFFICE VISIT (OUTPATIENT)
Dept: ENT CLINIC | Age: 68
End: 2023-01-12
Payer: MEDICARE

## 2023-01-12 VITALS
DIASTOLIC BLOOD PRESSURE: 70 MMHG | SYSTOLIC BLOOD PRESSURE: 142 MMHG | TEMPERATURE: 97.1 F | HEART RATE: 64 BPM | WEIGHT: 179.8 LBS | OXYGEN SATURATION: 94 % | HEIGHT: 70 IN | BODY MASS INDEX: 25.74 KG/M2

## 2023-01-12 DIAGNOSIS — H93.12 TINNITUS, LEFT: ICD-10-CM

## 2023-01-12 DIAGNOSIS — H90.A12 CONDUCTIVE HEARING LOSS OF LEFT EAR WITH RESTRICTED HEARING OF RIGHT EAR: ICD-10-CM

## 2023-01-12 DIAGNOSIS — Z97.4 WEARS HEARING AID IN BOTH EARS: ICD-10-CM

## 2023-01-12 DIAGNOSIS — H65.22 LEFT CHRONIC SEROUS OTITIS MEDIA: Primary | ICD-10-CM

## 2023-01-12 DIAGNOSIS — Z98.890 HX OF MYRINGOTOMY: ICD-10-CM

## 2023-01-12 DIAGNOSIS — H90.3 SENSORINEURAL HEARING LOSS (SNHL) OF BOTH EARS: ICD-10-CM

## 2023-01-12 PROCEDURE — G8427 DOCREV CUR MEDS BY ELIG CLIN: HCPCS | Performed by: PHYSICIAN ASSISTANT

## 2023-01-12 PROCEDURE — 99213 OFFICE O/P EST LOW 20 MIN: CPT | Performed by: PHYSICIAN ASSISTANT

## 2023-01-12 PROCEDURE — 1036F TOBACCO NON-USER: CPT | Performed by: PHYSICIAN ASSISTANT

## 2023-01-12 PROCEDURE — G8484 FLU IMMUNIZE NO ADMIN: HCPCS | Performed by: PHYSICIAN ASSISTANT

## 2023-01-12 PROCEDURE — 3078F DIAST BP <80 MM HG: CPT | Performed by: PHYSICIAN ASSISTANT

## 2023-01-12 PROCEDURE — 1123F ACP DISCUSS/DSCN MKR DOCD: CPT | Performed by: PHYSICIAN ASSISTANT

## 2023-01-12 PROCEDURE — G8417 CALC BMI ABV UP PARAM F/U: HCPCS | Performed by: PHYSICIAN ASSISTANT

## 2023-01-12 PROCEDURE — 3077F SYST BP >= 140 MM HG: CPT | Performed by: PHYSICIAN ASSISTANT

## 2023-01-12 PROCEDURE — 3017F COLORECTAL CA SCREEN DOC REV: CPT | Performed by: PHYSICIAN ASSISTANT

## 2023-01-12 NOTE — PROGRESS NOTES
[facility] [place] [facility]  [facility] EAR, NOSE AND THROAT  [address] [place] [address] [place] [address]  Dept: [phone]  Dept Fax: [phone]  Loc: [phone]    Tabitha Thakkar is a 75 y.o. male who was referred by No ref. provider found for:  Chief Complaint   Patient presents with    Follow-up     Patient is here for ear cleaning and tube check. Patient stated that his left ear it sounds like he is underwater. The right tube came out yesterday but seems to be doing alright    . HPI:     Patient presents for evaluation of decreased hearing. The patient underwent bilateral tube placement on 1/18/22 with Dr Ezequiel Gregory. He reports that about 3-4 months ago the tube on the left came out. He has felt like his hearing is muffled and underwater since that time. He reports that he has had at least 3-4 tubes in the left ear. He states the right tube fell out of his ear yesterday. He states that his right ear feels fine at this time. This was the first tube in the right ear reportedly. The patient is still active and in meetings frequently. He states that he cannot hear during most of the meetings, even with his hearing aids. He does report a left sided tinnitus. He denies otalgia, otorrhea, fevers, chills, vertigo. No other symptoms or concerns at this time. Subjective:      REVIEW OF SYSTEMS:    A complete multi-organ review of systems was performed using a new patient questionnaire, and reviewed by me.   ENT:  negative except as noted in HPI  CONSTITUTIONAL:  negative except as noted in HPI  EYES:  negative except as noted in HPI  RESPIRATORY:  negative except as noted in HPI  CARDIOVASCULAR:  negative except as noted in HPI  GASTROINTESTINAL:  negative except as noted in HPI  GENITOURINARY:  negative except as noted in HPI  MUSCULOSKELETAL:  negative except as noted in HPI  SKIN:  negative except as noted in HPI  ENDOCRINE/METABOLIC: negative except as noted in HPI  HEMATOLOGIC/LYMPHATIC:  negative except as noted in HPI  ALLERGY/IMMUN: negative except as noted in HPI  NEUROLOGICAL:  negative except as noted in HPI  BEHAVIOR/PSYCH:  negative except as noted in HPI    Past Medical History:  Past Medical History:   Diagnosis Date    Ankylosing spondylitis ([facility]     Current moderate episode of major depressive disorder without prior episode ([facility] 9/19/2019    Migraine     TIA (transient ischemic attack)        Social History:    TOBACCO:   reports that he has never smoked. He has never used smokeless tobacco.  ETOH:   reports current alcohol use. DRUGS:   reports no history of drug use. Family History:       Problem Relation Age of Onset    Arthritis Mother        Surgical History:  Past Surgical History:   Procedure Laterality Date    KNEE SURGERY  1983    both knees     MYRINGOTOMY AND TYMPANOSTOMY TUBE PLACEMENT Left 04/16/2013    Dr Amy Grace     MYRINGOTOMY AND TYMPANOSTOMY TUBE PLACEMENT Bilateral 01/18/2022    in office with Dr. Ferraro Flight          Objective: This is a 75 y.o. male. Patient is alert and oriented to person, place and time. Patient appears well developed, well nourished. Mood is happy with normal affect. Not obviously hearing impaired. No abnormality in speech noted. BP (!) 142/70 (Site: Left Upper Arm, Position: Sitting)   Pulse 64   Temp 97.1 °F (36.2 °C) (Infrared)   Ht 5' 10\" (1.778 m)   Wt 179 lb 12.8 oz (81.6 kg)   SpO2 94%   BMI 25.80 kg/m²     Head:   Normocephalic, atraumatic. No obvious masses or lesions noted. Ears:  External ears: Normal: no scars, lesions or masses. Mastoid process: No erythema noted. No tenderness to palpation. R External auditory canal: clear and free of any pathology  L External auditory canal: clear and free of any pathology   Tympanic membranes:  R intact, translucent                                                  L Retracted with yellow hued middle ear effusion.  Appears to have tympanic monomer as well     Eyes: MAEVE, EOM intact. Conjunctiva moist without discharge. Lungs: Normal effort of breathing, not obviously distressed. Neuro: Cranial nerves II-XII grossly intact. Extremities: No clubbing, edema, or cyanosis noted. Data: AUDIOGRAM        Assessment/Plan:     Diagnosis Orders   1. Left chronic serous otitis media        2. Sensorineural hearing loss (SNHL) of both ears  Audiometry with tympanometry      3. Wears hearing aid in both ears  Audiometry with tympanometry      4. Tinnitus, left        5. Conductive hearing loss of left ear with restricted hearing of right ear        6. Hx of myringotomy            -Patient has recurrence of chronic serous otitis after extrusion of left tube. Dr Ramirez Rivas agreed to replace the tube in the office today, see his procedure note  -Patient has had his current hearing aids for several years and would like to get set up for new ones. He states his hearing aids were from [employer]. He will be referred to [facility] audiology for audiogram and likely subsequent hearing aid evaluation  -Follow up for 6 month tube checks.  Contact office sooner with otorrhea, decreased hearing or other ENT concerns    (Please note that portions of this note may have been completed with a voice recognition program.  Efforts were made to edit the dictation but occasionally words are mis-transcribed.)    Electronically signed by LIZANDRO Garcia on 1/12/2023 at 9:58 AM

## 2023-01-19 ENCOUNTER — HOSPITAL ENCOUNTER (OUTPATIENT)
Dept: AUDIOLOGY | Age: 68
Discharge: HOME | End: 2023-01-19
Payer: MEDICARE

## 2023-01-19 PROCEDURE — 92557 COMPREHENSIVE HEARING TEST: CPT | Performed by: AUDIOLOGIST

## 2023-01-19 PROCEDURE — 92567 TYMPANOMETRY: CPT | Performed by: AUDIOLOGIST

## 2023-01-19 NOTE — PROGRESS NOTES
AUDIOLOGICAL EVALUATION      REASON FOR TESTING:  Audiometric evaluation per the request of LIZANDRO Castillo , due to the diagnosis of sensorineural hearing loss of both ears, wears hearing aids in both ears. The patient is experiencing increased hearing loss in both ears. Constant bilateral music like tinnitus is reported. Some dizziness is experienced when he gets up in the morning. He has had previous ear tubes in both ears, more in his left ear than his right ear. Dr. Aleena Hong completed a myringotomy on the left side 01/12/2023. The patient noted improvement in his hearing in his left ear following the procedure. The patient continues to have significant hearing difficulties and is interested in pursuing new hearing aids as his current hearing aids from [employer] are not working. OTOSCOPY: Clear canal with normal appearing tympanic membrane- right ear  Clear canal with abnormal appearing tympanic membrane with a visible perforation- left ear     AUDIOGRAM          Reliability: Good    COMMENTS: Mild to profound mostly sensorineural hearing loss, bilaterally. A mixed component is present at 500 Hz in both ear and at 0351-9348 Hz in the right ear. Asymmetrical hearing loss greater int he left ear than the right ear at 3344-3798 Hz. Word recognition ability is poor at 60% in each ear. 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 RECOMMENDATION(S):   1- Follow up with Dr. Aleena Hong regarding these results and any further testing or treatment recommendations. 2- Repeat testing with medical management or annually to monitor for progression, sooner with any significant changes or new concerns.   3- Binaural hearing aids are recommended pending medical clearance and patient motivation.

## 2023-01-23 ENCOUNTER — TELEPHONE (OUTPATIENT)
Dept: AUDIOLOGY | Age: 68
End: 2023-01-23

## 2023-01-23 NOTE — TELEPHONE ENCOUNTER
Left message for the patient informing his that Medicare and his secondary [employer] does not cover hearing aids.  Cody Watts does partner with [address] [place] [address] for a discount program. For more information the patient can call [phone]

## 2023-02-05 NOTE — PROGRESS NOTES
Myringotomy and tympanostomy tube placement    After an adequate level of topical anesthesia of the posterior inferior quadrant of the left tympanic membrane had been achieved with phenol, alcohol was instilled and suctioned dry. A radial incision was made in the posterior inferior quadrant. Middle ear fluid was suctioned. Agosto ventilation tube was placed without difficulty. The patient's hearing improved immediately. The patient tolerated the procedure well.

## 2023-02-13 ENCOUNTER — HOSPITAL ENCOUNTER (OUTPATIENT)
Dept: AUDIOLOGY | Age: 68
Discharge: HOME | End: 2023-02-13

## 2023-02-13 PROCEDURE — 9990000010 HC NO CHARGE VISIT: Performed by: AUDIOLOGIST

## 2023-02-13 NOTE — PROGRESS NOTES
NEW HEARING AID CONSULTATION:  Available hearing aid styles, technologies and options were discussed. The patient is interested in pursuing [employer] MySupportAssistanteo L90-R hearing aids in color #01 with #1P receivers. He prefers to try wearing power domes as he is accustomed to them before ordering soft slim tips. The patient's hearing aid fitting is scheduled for 02/21/2023.

## 2023-02-16 ENCOUNTER — HOSPITAL ENCOUNTER (OUTPATIENT)
Age: 68
Setting detail: SPECIMEN
Discharge: HOME | End: 2023-02-16

## 2023-02-16 ENCOUNTER — OFFICE VISIT (OUTPATIENT)
Dept: FAMILY MEDICINE CLINIC | Age: 68
End: 2023-02-16

## 2023-02-16 VITALS
SYSTOLIC BLOOD PRESSURE: 144 MMHG | DIASTOLIC BLOOD PRESSURE: 84 MMHG | HEIGHT: 66 IN | BODY MASS INDEX: 28.12 KG/M2 | TEMPERATURE: 97.2 F | OXYGEN SATURATION: 96 % | RESPIRATION RATE: 16 BRPM | HEART RATE: 84 BPM | WEIGHT: 175 LBS

## 2023-02-16 DIAGNOSIS — Z12.5 SCREENING PSA (PROSTATE SPECIFIC ANTIGEN): ICD-10-CM

## 2023-02-16 DIAGNOSIS — Z00.00 MEDICARE ANNUAL WELLNESS VISIT, SUBSEQUENT: Primary | ICD-10-CM

## 2023-02-16 DIAGNOSIS — G63 VITAMIN B12 DEFICIENCY NEUROPATHY (HCC): ICD-10-CM

## 2023-02-16 DIAGNOSIS — E78.2 HYPERCHOLESTEROLEMIA WITH HYPERTRIGLYCERIDEMIA: ICD-10-CM

## 2023-02-16 DIAGNOSIS — G89.29 CHRONIC LEFT SHOULDER PAIN: ICD-10-CM

## 2023-02-16 DIAGNOSIS — I10 ESSENTIAL HYPERTENSION: ICD-10-CM

## 2023-02-16 DIAGNOSIS — E53.8 VITAMIN B12 DEFICIENCY NEUROPATHY (HCC): ICD-10-CM

## 2023-02-16 DIAGNOSIS — M25.512 CHRONIC LEFT SHOULDER PAIN: ICD-10-CM

## 2023-02-16 DIAGNOSIS — G62.9 PERIPHERAL POLYNEUROPATHY: ICD-10-CM

## 2023-02-16 LAB
ABSOLUTE EOS #: 0.28 K/UL (ref 0–0.44)
ABSOLUTE IMMATURE GRANULOCYTE: <0.03 K/UL (ref 0–0.3)
ABSOLUTE LYMPH #: 2.19 K/UL (ref 1.1–3.7)
ABSOLUTE MONO #: 0.91 K/UL (ref 0.1–1.2)
ALBUMIN SERPL-MCNC: 4.3 G/DL (ref 3.5–5.2)
ALBUMIN/GLOBULIN RATIO: 1.4 (ref 1–2.5)
ALP SERPL-CCNC: 53 U/L (ref 40–129)
ALT SERPL-CCNC: 14 U/L (ref 5–41)
ANION GAP SERPL CALCULATED.3IONS-SCNC: 15 MMOL/L (ref 9–17)
AST SERPL-CCNC: 22 U/L
BASOPHILS # BLD: 1 % (ref 0–2)
BASOPHILS ABSOLUTE: 0.06 K/UL (ref 0–0.2)
BILIRUB SERPL-MCNC: 0.7 MG/DL (ref 0.3–1.2)
BUN SERPL-MCNC: 18 MG/DL (ref 8–23)
CALCIUM SERPL-MCNC: 9.6 MG/DL (ref 8.6–10.4)
CHLORIDE SERPL-SCNC: 106 MMOL/L (ref 98–107)
CHOLEST SERPL-MCNC: 237 MG/DL
CHOLESTEROL/HDL RATIO: 7
CO2 SERPL-SCNC: 20 MMOL/L (ref 20–31)
CREAT SERPL-MCNC: 1.13 MG/DL (ref 0.7–1.2)
EOSINOPHILS RELATIVE PERCENT: 3 % (ref 1–4)
FOLATE SERPL-MCNC: 11 NG/ML
GFR SERPL CREATININE-BSD FRML MDRD: >60 ML/MIN/1.73M2
GLUCOSE SERPL-MCNC: 79 MG/DL (ref 70–99)
HCT VFR BLD AUTO: 46.3 % (ref 40.7–50.3)
HDLC SERPL-MCNC: 34 MG/DL
HGB BLD-MCNC: 15.1 G/DL (ref 13–17)
IMMATURE GRANULOCYTES: 0 %
LDLC SERPL CALC-MCNC: 149 MG/DL (ref 0–130)
LYMPHOCYTES # BLD: 26 % (ref 24–43)
MAGNESIUM SERPL-MCNC: 2.2 MG/DL (ref 1.6–2.6)
MCH RBC QN AUTO: 28.4 PG (ref 25.2–33.5)
MCHC RBC AUTO-ENTMCNC: 32.6 G/DL (ref 28.4–34.8)
MCV RBC AUTO: 87 FL (ref 82.6–102.9)
MONOCYTES # BLD: 11 % (ref 3–12)
NRBC AUTOMATED: 0 PER 100 WBC
PDW BLD-RTO: 12.7 % (ref 11.8–14.4)
PLATELET # BLD AUTO: 306 K/UL (ref 138–453)
PMV BLD AUTO: 11.9 FL (ref 8.1–13.5)
POTASSIUM SERPL-SCNC: 4.1 MMOL/L (ref 3.7–5.3)
PROSTATE SPECIFIC ANTIGEN: 0.33 NG/ML
PROT SERPL-MCNC: 7.4 G/DL (ref 6.4–8.3)
RBC # BLD: 5.32 M/UL (ref 4.21–5.77)
SEG NEUTROPHILS: 59 % (ref 36–65)
SEGMENTED NEUTROPHILS ABSOLUTE COUNT: 4.95 K/UL (ref 1.5–8.1)
SODIUM SERPL-SCNC: 141 MMOL/L (ref 135–144)
TRIGL SERPL-MCNC: 270 MG/DL
TSH SERPL-ACNC: 3.57 UIU/ML (ref 0.3–5)
VIT B12 SERPL-MCNC: 385 PG/ML (ref 232–1245)
WBC # BLD AUTO: 8.4 K/UL (ref 3.5–11.3)

## 2023-02-16 SDOH — ECONOMIC STABILITY: FOOD INSECURITY: WITHIN THE PAST 12 MONTHS, THE FOOD YOU BOUGHT JUST DIDN'T LAST AND YOU DIDN'T HAVE MONEY TO GET MORE.: NEVER TRUE

## 2023-02-16 SDOH — ECONOMIC STABILITY: FOOD INSECURITY: WITHIN THE PAST 12 MONTHS, YOU WORRIED THAT YOUR FOOD WOULD RUN OUT BEFORE YOU GOT MONEY TO BUY MORE.: NEVER TRUE

## 2023-02-16 SDOH — ECONOMIC STABILITY: HOUSING INSECURITY
IN THE LAST 12 MONTHS, WAS THERE A TIME WHEN YOU DID NOT HAVE A STEADY PLACE TO SLEEP OR SLEPT IN A SHELTER (INCLUDING NOW)?: NO

## 2023-02-16 SDOH — ECONOMIC STABILITY: INCOME INSECURITY: HOW HARD IS IT FOR YOU TO PAY FOR THE VERY BASICS LIKE FOOD, HOUSING, MEDICAL CARE, AND HEATING?: NOT HARD AT ALL

## 2023-02-16 ASSESSMENT — PATIENT HEALTH QUESTIONNAIRE - PHQ9
4. FEELING TIRED OR HAVING LITTLE ENERGY: 0
SUM OF ALL RESPONSES TO PHQ QUESTIONS 1-9: 0
SUM OF ALL RESPONSES TO PHQ QUESTIONS 1-9: 0
8. MOVING OR SPEAKING SO SLOWLY THAT OTHER PEOPLE COULD HAVE NOTICED. OR THE OPPOSITE, BEING SO FIGETY OR RESTLESS THAT YOU HAVE BEEN MOVING AROUND A LOT MORE THAN USUAL: 0
5. POOR APPETITE OR OVEREATING: 0
9. THOUGHTS THAT YOU WOULD BE BETTER OFF DEAD, OR OF HURTING YOURSELF: 0
7. TROUBLE CONCENTRATING ON THINGS, SUCH AS READING THE NEWSPAPER OR WATCHING TELEVISION: 0
2. FEELING DOWN, DEPRESSED OR HOPELESS: 0
SUM OF ALL RESPONSES TO PHQ9 QUESTIONS 1 & 2: 0
SUM OF ALL RESPONSES TO PHQ QUESTIONS 1-9: 0
6. FEELING BAD ABOUT YOURSELF - OR THAT YOU ARE A FAILURE OR HAVE LET YOURSELF OR YOUR FAMILY DOWN: 0
3. TROUBLE FALLING OR STAYING ASLEEP: 0
SUM OF ALL RESPONSES TO PHQ QUESTIONS 1-9: 0
1. LITTLE INTEREST OR PLEASURE IN DOING THINGS: 0
10. IF YOU CHECKED OFF ANY PROBLEMS, HOW DIFFICULT HAVE THESE PROBLEMS MADE IT FOR YOU TO DO YOUR WORK, TAKE CARE OF THINGS AT HOME, OR GET ALONG WITH OTHER PEOPLE: 0

## 2023-02-16 ASSESSMENT — LIFESTYLE VARIABLES
HOW OFTEN DO YOU HAVE A DRINK CONTAINING ALCOHOL: MONTHLY OR LESS
HOW MANY STANDARD DRINKS CONTAINING ALCOHOL DO YOU HAVE ON A TYPICAL DAY: 1 OR 2

## 2023-02-16 NOTE — PATIENT INSTRUCTIONS
Fatigue: Care Instructions  Your Care Instructions     Fatigue is a feeling of tiredness, exhaustion, or lack of energy. You may feel fatigue because of too much or not enough activity. It can also come from stress, lack of sleep, boredom, and poor diet. Many medical problems, such as viral infections, can cause fatigue. Emotional problems, especially depression, are often the cause of fatigue. Fatigue is most often a symptom of another problem. Treatment for fatigue depends on the cause. For example, if you have fatigue because you have a certain health problem, treating this problem also treats your fatigue. If depression or anxiety is the cause, treatment may help. Follow-up care is a key part of your treatment and safety. Be sure to make and go to all appointments, and call your doctor if you are having problems. It's also a good idea to know your test results and keep a list of the medicines you take. How can you care for yourself at home? Get regular exercise. But don't overdo it. Go back and forth between rest and exercise. Get plenty of rest.  Eat a healthy diet. Do not skip meals, especially breakfast.  Reduce your use of caffeine, tobacco, and alcohol. Caffeine is most often found in coffee, tea, cola drinks, and chocolate. Limit medicines that can cause fatigue. This includes tranquilizers and cold and allergy medicines. When should you call for help? Watch closely for changes in your health, and be sure to contact your doctor if:    You have new symptoms such as fever or a rash.     Your fatigue gets worse.     You have been feeling down, depressed, or hopeless. Or you may have lost interest in things that you usually enjoy.     You are not getting better as expected. Where can you learn more? Go to http://www.woods.com/ and enter T078 to learn more about \"Fatigue: Care Instructions. \"  Current as of: February 9, 2022               Content Version: 13.5  © 3625-2288 Healthwise, Incorporated. Care instructions adapted under license by [facility] ([facility]). If you have questions about a medical condition or this instruction, always ask your healthcare professional. [address] any warranty or liability for your use of this information. Learning About Dental Care for Older Adults  Dental care for older adults: Overview  Dental care for older people is much the same as for younger adults. But older adults do have concerns that younger adults do not. Older adults may have problems with gum disease and decay on the roots of their teeth. They may need missing teeth replaced or broken fillings fixed. Or they may have dentures that need to be cared for. Some older adults may have trouble holding a toothbrush. You can help remind the person you are caring for to brush and floss their teeth or to clean their dentures. In some cases, you may need to do the brushing and other dental care tasks. People who have trouble using their hands or who have dementia may need this extra help. How can you help with dental care? Normal dental care  To keep the teeth and gums healthy:  Brush the teeth with fluoride toothpaste twice a day--in the morning and at night--and floss at least once a day. Plaque can quickly build up on the teeth of older adults. Watch for the signs of gum disease. These signs include gums that bleed after brushing or after eating hard foods, such as apples. See a dentist regularly. Many experts recommend checkups every 6 months. Keep the dentist up to date on any new medications the person is taking. Encourage a balanced diet that includes whole grains, vegetables, and fruits, and that is low in saturated fat and sodium. Encourage the person you're caring for not to use tobacco products. They can affect dental and general health. Many older adults have a fixed income and feel that they can't afford dental care.  But most towns and [facility] have programs in which dentists help older adults by lowering fees. Contact your area's public health offices or  for information about dental care in your area. Using a toothbrush  Older adults with arthritis sometimes have trouble brushing their teeth because they can't easily hold the toothbrush. Their hands and fingers may be stiff, painful, or weak. If this is the case, you can: Offer an electric toothbrush. Enlarge the handle of a non-electric toothbrush by wrapping a sponge, an elastic bandage, or adhesive tape around it. Push the toothbrush handle through a ball made of rubber or soft foam.  Make the handle longer and thicker by taping Popsicle sticks or tongue depressors to it. You may also be able to buy special toothbrushes, toothpaste dispensers, and floss holders. Your doctor may recommend a soft-bristle toothbrush if the person you care for bleeds easily. Bleeding can happen because of a health problem or from certain medicines. A toothpaste for sensitive teeth may help if the person you care for has sensitive teeth. How do you brush and floss someone's teeth? If the person you are caring for has a hard time cleaning their teeth on their own, you may need to brush and floss their teeth for them. It may be easiest to have the person sit and face away from you, and to sit or stand behind them. That way you can steady their head against your arm as you reach around to floss and brush their teeth. Choose a place that has good lighting and is comfortable for both of you. Before you begin, gather your supplies. You will need gloves, floss, a toothbrush, and a container to hold water if you are not near a sink. Wash and dry your hands well and put on gloves. Start by flossing:  Gently work a piece of floss between each of the teeth toward the gums. A plastic flossing tool may make this easier, and they are available at most [facility].   Curve the floss around each tooth into a U-shape and gently slide it under the gum line. Move the floss firmly up and down several times to scrape off the plaque. After you've finished flossing, throw away the used floss and begin brushing:  Wet the brush and apply toothpaste. Place the brush at a 45-degree angle where the teeth meet the gums. Press firmly, and move the brush in small circles over the surface of the teeth. Be careful not to brush too hard. Vigorous brushing can make the gums pull away from the teeth and can scratch the tooth enamel. Brush all surfaces of the teeth, on the tongue side and on the cheek side. Pay special attention to the front teeth and all surfaces of the back teeth. Brush chewing surfaces with short back-and-forth strokes. After you've finished, help the person rinse the remaining toothpaste from their mouth. Where can you learn more? Go to http://www.woods.com/ and enter F944 to learn more about \"Learning About Dental Care for Older Adults. \"  Current as of: June 16, 2022               Content Version: 13.5  © 1024-7855 [employer]. Care instructions adapted under license by [address] [place] [address]. If you have questions about a medical condition or this instruction, always ask your healthcare professional. [place] [address] any warranty or liability for your use of this information. Learning About Vision Tests  What are vision tests? The four most common vision tests are visual acuity tests, refraction, visual field tests, and color vision tests. Visual acuity (sharpness) tests  These tests are used: To see if you need glasses or contact lenses. To monitor an eye problem. To check an eye injury. Visual acuity tests are done as part of routine exams. You may also have this test when you get your 's license or apply for some types of jobs. Visual field tests  These tests are used: To check for vision loss in any area of your range of vision.   To screen for certain eye diseases. To look for nerve damage after a stroke, head injury, or other problem that could reduce blood flow to the brain. Refraction and color tests  A refraction test is done to find the right prescription for glasses and contact lenses. A color vision test is done to check for color blindness. Color vision is often tested as part of a routine exam. You may also have this test when you apply for a job where recognizing different colors is important, such as , electronics, or the [employer]. How are vision tests done? Visual acuity test   You cover one eye at a time. You read aloud from a wall chart across the room. You read aloud from a small card that you hold in your hand. Refraction   You look into a special device. The device puts lenses of different strengths in front of each eye to see how strong your glasses or contact lenses need to be. Visual field tests   Your doctor may have you look through special machines. Or your doctor may simply have you stare straight ahead while they move a finger into and out of your field of vision. Color vision test   You look at pieces of printed test patterns in various colors. You say what number or symbol you see. Your doctor may have you trace the number or symbol using a pointer. How do these tests feel? There is very little chance of having a problem from this test. If dilating drops are used for a vision test, they may make the eyes sting and cause a medicine taste in the mouth. Follow-up care is a key part of your treatment and safety. Be sure to make and go to all appointments, and call your doctor if you are having problems. It's also a good idea to know your test results and keep a list of the medicines you take. Where can you learn more? Go to http://www.toribio.com/ and enter G551 to learn more about \"Learning About Vision Tests. \"  Current as of: October 12, 2022               Content Version: 13.5  © 5488-8115 Healthwise, Incorporated. Care instructions adapted under license by [facility] ([facility]). If you have questions about a medical condition or this instruction, always ask your healthcare professional. [address] any warranty or liability for your use of this information. Advance Directives: Care Instructions  Overview  An advance directive is a legal way to state your wishes at the end of your life. It tells your family and your doctor what to do if you can't say what you want. There are two main types of advance directives. You can change them any time your wishes change. Living will. This form tells your family and your doctor your wishes about life support and other treatment. The form is also called a declaration. Medical power of . This form lets you name a person to make treatment decisions for you when you can't speak for yourself. This person is called a health care agent (health care proxy, health care surrogate). The form is also called a durable power of  for health care. If you do not have an advance directive, decisions about your medical care may be made by a family member, or by a doctor or a  who doesn't know you. It may help to think of an advance directive as a gift to the people who care for you. If you have one, they won't have to make tough decisions by themselves. For more information, including forms for your state, see the [address] website (www.caringinfo.org/planning/advance-directives/). Follow-up care is a key part of your treatment and safety. Be sure to make and go to all appointments, and call your doctor if you are having problems. It's also a good idea to know your test results and keep a list of the medicines you take. What should you include in an advance directive? Many states have a unique advance directive form.  (It may ask you to address specific issues.) Or you might use a universal form that's approved by many states. If your form doesn't tell you what to address, it may be hard to know what to include in your advance directive. Use the questions below to help you get started. Who do you want to make decisions about your medical care if you are not able to? What life-support measures do you want if you have a serious illness that gets worse over time or can't be cured? What are you most afraid of that might happen? (Maybe you're afraid of having pain, losing your independence, or being kept alive by machines.)  Where would you prefer to die? (Your home? A hospital? A nursing home?)  Do you want to donate your organs when you die? Do you want certain Zoroastrian practices performed before you die? When should you call for help? Be sure to contact your doctor if you have any questions. Where can you learn more? Go to http://www.toribio.com/ and enter R264 to learn more about \"Advance Directives: Care Instructions. \"  Current as of: June 16, 2022               Content Version: 13.5  © 2006-2022 [employer]. Care instructions adapted under license by [facility] ([facility]). If you have questions about a medical condition or this instruction, always ask your healthcare professional. [place] [address] any warranty or liability for your use of this information. A Healthy Heart: Care Instructions  Your Care Instructions     Coronary artery disease, also called heart disease, occurs when a substance called plaque builds up in the vessels that supply oxygen-rich blood to your heart muscle. This can narrow the blood vessels and reduce blood flow. A heart attack happens when blood flow is completely blocked. A high-fat diet, smoking, and other factors increase the risk of heart disease. Your doctor has found that you have a chance of having heart disease. You can do lots of things to keep your heart healthy.  It may not be easy, but you can change your diet, exercise more, and quit smoking. These steps really work to lower your chance of heart disease. Follow-up care is a key part of your treatment and safety. Be sure to make and go to all appointments, and call your doctor if you are having problems. It's also a good idea to know your test results and keep a list of the medicines you take. How can you care for yourself at home? Diet    Use less salt when you cook and eat. This helps lower your blood pressure. Taste food before salting. Add only a little salt when you think you need it. With time, your taste buds will adjust to less salt.     Eat fewer snack items, fast foods, canned soups, and other high-salt, high-fat, processed foods.     Read food labels and try to avoid saturated and trans fats. They increase your risk of heart disease by raising cholesterol levels.     Limit the amount of solid fat-butter, margarine, and shortening-you eat. Use olive, peanut, or canola oil when you cook. Bake, broil, and steam foods instead of frying them.     Eat a variety of fruit and vegetables every day. Dark green, deep orange, red, or yellow fruits and vegetables are especially good for you. Examples include spinach, carrots, peaches, and berries.     Foods high in fiber can reduce your cholesterol and provide important vitamins and minerals. High-fiber foods include whole-grain cereals and breads, oatmeal, beans, brown rice, citrus fruits, and apples.     Eat lean proteins. Heart-healthy proteins include seafood, lean meats and poultry, eggs, beans, peas, nuts, seeds, and soy products.     Limit drinks and foods with added sugar. These include candy, desserts, and soda pop. Lifestyle changes    If your doctor recommends it, get more exercise. Walking is a good choice. Bit by bit, increase the amount you walk every day. Try for at least 30 minutes on most days of the week. You also may want to swim, bike, or do other activities.     Do not smoke.  If you need help quitting, talk to your doctor about stop-smoking programs and medicines. These can increase your chances of quitting for good. Quitting smoking may be the most important step you can take to protect your heart. It is never too late to quit.     Limit alcohol to 2 drinks a day for men and 1 drink a day for women. Too much alcohol can cause health problems.     Manage other health problems such as diabetes, high blood pressure, and high cholesterol. If you think you may have a problem with alcohol or drug use, talk to your doctor. Medicines    Take your medicines exactly as prescribed. Call your doctor if you think you are having a problem with your medicine.     If your doctor recommends aspirin, take the amount directed each day. Make sure you take aspirin and not another kind of pain reliever, such as acetaminophen (Tylenol). When should you call for help? Call 911 if you have symptoms of a heart attack. These may include:    Chest pain or pressure, or a strange feeling in the chest.     Sweating.     Shortness of breath.     Pain, pressure, or a strange feeling in the back, neck, jaw, or upper belly or in one or both shoulders or arms.     Lightheadedness or sudden weakness.     A fast or irregular heartbeat. After you call 911, the  may tell you to chew 1 adult-strength or 2 to 4 low-dose aspirin. Wait for an ambulance. Do not try to drive yourself. Watch closely for changes in your health, and be sure to contact your doctor if you have any problems. Where can you learn more? Go to http://www.toribio.com/ and enter F075 to learn more about \"A Healthy Heart: Care Instructions. \"  Current as of: September 7, 2022               Content Version: 13.5  © 1574-1283 Healthwise, Incorporated. Care instructions adapted under license by [facility] ([facility]).  If you have questions about a medical condition or this instruction, always ask your healthcare professional. Navdeep Levy disclaims any warranty or liability for your use of this information. Personalized Preventive Plan for Elsy Muhammad - 2/16/2023  Medicare offers a range of preventive health benefits. Some of the tests and screenings are paid in full while other may be subject to a deductible, co-insurance, and/or copay. Some of these benefits include a comprehensive review of your medical history including lifestyle, illnesses that may run in your family, and various assessments and screenings as appropriate. After reviewing your medical record and screening and assessments performed today your provider may have ordered immunizations, labs, imaging, and/or referrals for you. A list of these orders (if applicable) as well as your Preventive Care list are included within your After Visit Summary for your review. Other Preventive Recommendations:    A preventive eye exam performed by an eye specialist is recommended every 1-2 years to screen for glaucoma; cataracts, macular degeneration, and other eye disorders. A preventive dental visit is recommended every 6 months. Try to get at least 150 minutes of exercise per week or 10,000 steps per day on a pedometer . Order or download the FREE \"Exercise & Physical Activity: Your Everyday Guide\" from The [facility]. Call 3-366.698.6552 or search The [facility] online. You need 4429-0585 mg of calcium and 2685-1711 IU of vitamin D per day. It is possible to meet your calcium requirement with diet alone, but a vitamin D supplement is usually necessary to meet this goal.  When exposed to the sun, use a sunscreen that protects against both UVA and UVB radiation with an SPF of 30 or greater. Reapply every 2 to 3 hours or after sweating, drying off with a towel, or swimming. Always wear a seat belt when traveling in a car. Always wear a helmet when riding a bicycle or motorcycle.

## 2023-02-16 NOTE — PROGRESS NOTES
Medicare Annual Wellness Visit    Jillian Mcghee is here for Medicare AWV (Annual Wellness visit ), Health Maintenance (Vaccines; lipids; annual wellness visit - completing today ), Shoulder Pain (Gets very sore sometimes ), Flank Pain (Not terrible but notices it sometimes), and Peripheral Neuropathy (Would like gabapentin for neuropathy in feet )    Assessment & Plan   Medicare annual wellness visit, subsequent  Chronic left shoulder pain  Essential hypertension  -     CBC with Auto Differential; Future  -     Comprehensive Metabolic Panel, Fasting; Future  -     Lipid Panel; Future  -     Magnesium; Future  -     TSH with Reflex; Future  Hypercholesterolemia with hypertriglyceridemia  -     CBC with Auto Differential; Future  -     Comprehensive Metabolic Panel, Fasting; Future  -     Lipid Panel; Future  -     Magnesium; Future  -     TSH with Reflex; Future  Vitamin B12 deficiency neuropathy (HCC)  -     Vitamin B12 & Folate; Future  Screening PSA (prostate specific antigen)  -     PSA Screening; Future     -Wants to set up a time for a joint injection   -will send in gabapentin for his neuropathy OA to start at night and can titrate up based on tolerability   -Can take motrin PRN for pain   -BP elevated today, re eval in 2 weeks  Due for labs, did fast so will complete and then let him know results via [employer]   Last labs from over a year reviewed   AHA diet, stay active  Last set of weights and BP reviewed    Recommendations for Preventive Services Due: see orders and patient instructions/AVS.  Recommended screening schedule for the next 5-10 years is provided to the patient in written form: see Patient Instructions/AVS.     Return for schedule for shoulder joint injection - labs today .      Subjective     Hot shower, muscle relaxer has helped   But is getting worse and now getting headaches  Could not do PT because too painful   Feels weaker- can't shut truck door   Night can not sleep   Update 2/16/2023 States that when he reaches out and grabs something it is very painful . He can live with it. Does take motrin for pain. Had a steroid shot in the past and this helped. Had completed at [facility] [place]  Lasted for several months   No numbness at all     Right sided back pain  Very light   Not enough to were he is bothered by it  Felt like this before his colonoscopy   States that diet is terrible  Does not feel costipated      Patient's complete Health Risk Assessment and screening values have been reviewed and are found in Flowsheets. The following problems were reviewed today and where indicated follow up appointments were made and/or referrals ordered. Positive Risk Factor Screenings with Interventions:               General HRA Questions:  Select all that apply: (!) New or Increased Fatigue    Fatigue Interventions:  See above        Dentist Screen:  Have you seen the dentist within the past year?: (!) No    Intervention:  Advised to schedule with their dentist    Hearing Screen:  Do you or your family notice any trouble with your hearing that hasn't been managed with hearing aids?: (!) Yes (getting a new set of hearing aids)    Interventions:  Has hearing aids     Vision Screen:  Do you have difficulty driving, watching TV, or doing any of your daily activities because of your eyesight?: (!) Yes (Bright lights at night)  Have you had an eye exam within the past year?: (!) No  No results found. Interventions:   Patient encouraged to make appointment with their eye specialist                      Objective   Vitals:    02/16/23 1336 02/16/23 1338   BP: (!) 146/84 (!) 144/84   Pulse: 84    Resp: 16    Temp: 97.2 °F (36.2 °C)    TempSrc: Temporal    SpO2: 96%    Weight: 175 lb (79.4 kg)    Height: 5' 5.55\" (1.665 m)       Body mass index is 28.63 kg/m².    BP Readings from Last 3 Encounters:   02/16/23 (!) 144/84   01/12/23 (!) 142/70   10/06/22 126/80     Wt Readings from Last 3 Encounters:   02/16/23 175 lb (79.4 kg) 01/12/23 179 lb 12.8 oz (81.6 kg)   10/29/22 170 lb (77.1 kg)     . Allergies   Allergen Reactions    Shellfish-Derived Products      Prior to Visit Medications    Medication Sig Taking? Authorizing Provider   ibuprofen (IBU) 800 MG tablet Take 1 tablet by mouth every 8 hours as needed for Pain Take with food.  Yes HEVER Serrano CNP   Handicap Placard MISC by Does not apply route Expires 5 year from date of prescription Yes HEVER Serrano CNP   amLODIPine (NORVASC) 10 MG tablet Take 1 tablet by mouth once daily Yes HEVER Serrano CNP   levothyroxine (EUTHYROX) 25 MCG tablet TAKE 1 TABLET BY MOUTH ONCE DAILY WITH  WATER  ONLY  ON  AN  EMPTY  STOMACH  WAIT  30  MINUTES  BEFORE  EATING  OR  TAKING  OTHER  MEDS Yes HEVER Serrano CNP   fenofibrate (TRIGLIDE) 160 MG tablet Take 1 tablet by mouth daily Yes HEVER Serrano CNP   atorvastatin (LIPITOR) 80 MG tablet Take 1 tablet by mouth nightly Yes HEVER Serrano CNP   omega-3 acid ethyl esters (LOVAZA) 1 g capsule Take 2 capsules by mouth 2 times daily Yes HEVER Serrano CNP   aspirin 81 MG tablet Take 81 mg by mouth daily Yes Historical Provider, MD   levothyroxine (EUTHYROX) 25 MCG tablet TAKE 1 TABLET BY MOUTH ONCE DAILY WITH  WATER  ONLY  ON  AN  EMPTY  STOMACH  WAIT  30  MINUTES  BEFORE  EATING  OR  TAKING  OTHER  MEDS  HEVER Serrano CNP       CareTeam (Including outside providers/suppliers regularly involved in providing care):   Patient Care Team:  HEVER Serrano CNP as PCP - General (Family Medicine)  HEVER Serrano CNP as PCP - Empaneled Provider     Reviewed and updated this visit:  Tobacco  Allergies  Meds  Med Hx  Surg Hx  Soc Hx  Fam Hx             HEVER Culver CNP

## 2023-02-17 RX ORDER — GABAPENTIN 100 MG/1
100 CAPSULE ORAL NIGHTLY
Qty: 90 CAPSULE | Refills: 1 | Status: SHIPPED | OUTPATIENT
Start: 2023-02-17 | End: 2023-05-18

## 2023-02-21 ENCOUNTER — HOSPITAL ENCOUNTER (OUTPATIENT)
Dept: AUDIOLOGY | Age: 68
Discharge: HOME | End: 2023-02-21

## 2023-02-21 PROCEDURE — V5241 DISPENSING FEE, MONAURAL: HCPCS | Performed by: AUDIOLOGIST

## 2023-02-21 PROCEDURE — V5261 HEARING AID, DIGIT, BIN, BTE: HCPCS | Performed by: AUDIOLOGIST

## 2023-02-21 NOTE — PROGRESS NOTES
[facility]#: [id number]   [facility]#: [id number]  PAYOR: Self  Number of visits allowed: N/A  Charge for follow up visits: N/A    DIAGNOSIS: H90.3    NEW HEARING AID FITTING: A Phonak Audeo L70R hearing aid with #1P  and medium power dome was fit and dispensed for both ears.. Explained care, use and insertion/removal.  Programmed. Paired to the patient's I phone. Hearing aid fitting recheck scheduled for 03/03/2023.      SPEECH MAPPING:    The [employer] real ear system was used to perform verification of Zeke's hearing aid fitting. The output of Zeke's Phonak amplification was programmed to match appropriate NAL-NAL2 targets for MPO, soft, medium and loud stimuli utilizing speech mapping based on his current audiogram.    Speech mapping results suggest: good target match with improved audibility.    Overall gain was reduced to 85% for patient's comfort.     Sound recover is active and verified    Volume control and memory button (autosense and speech in loud noise) is active on the hearing aid.    Patient's hearing aids to be paired to My [employer] at follow up.

## 2023-03-02 ENCOUNTER — NURSE ONLY (OUTPATIENT)
Dept: FAMILY MEDICINE CLINIC | Age: 68
End: 2023-03-02

## 2023-03-02 VITALS
RESPIRATION RATE: 16 BRPM | OXYGEN SATURATION: 96 % | DIASTOLIC BLOOD PRESSURE: 82 MMHG | HEART RATE: 72 BPM | SYSTOLIC BLOOD PRESSURE: 136 MMHG

## 2023-03-02 DIAGNOSIS — I10 ESSENTIAL HYPERTENSION: Primary | ICD-10-CM

## 2023-03-06 ENCOUNTER — OFFICE VISIT (OUTPATIENT)
Dept: FAMILY MEDICINE CLINIC | Age: 68
End: 2023-03-06
Payer: MEDICARE

## 2023-03-06 VITALS
OXYGEN SATURATION: 98 % | BODY MASS INDEX: 29.06 KG/M2 | SYSTOLIC BLOOD PRESSURE: 146 MMHG | WEIGHT: 177.6 LBS | HEART RATE: 61 BPM | TEMPERATURE: 97.6 F | RESPIRATION RATE: 18 BRPM | DIASTOLIC BLOOD PRESSURE: 74 MMHG

## 2023-03-06 DIAGNOSIS — M19.012 ARTHRITIS OF LEFT SHOULDER REGION: Primary | ICD-10-CM

## 2023-03-06 DIAGNOSIS — F43.0 STRESS REACTION: ICD-10-CM

## 2023-03-06 DIAGNOSIS — I10 ESSENTIAL HYPERTENSION: ICD-10-CM

## 2023-03-06 PROCEDURE — G8427 DOCREV CUR MEDS BY ELIG CLIN: HCPCS | Performed by: NURSE PRACTITIONER

## 2023-03-06 PROCEDURE — 3077F SYST BP >= 140 MM HG: CPT | Performed by: NURSE PRACTITIONER

## 2023-03-06 PROCEDURE — 99214 OFFICE O/P EST MOD 30 MIN: CPT | Performed by: NURSE PRACTITIONER

## 2023-03-06 PROCEDURE — G8417 CALC BMI ABV UP PARAM F/U: HCPCS | Performed by: NURSE PRACTITIONER

## 2023-03-06 PROCEDURE — G8484 FLU IMMUNIZE NO ADMIN: HCPCS | Performed by: NURSE PRACTITIONER

## 2023-03-06 PROCEDURE — 1123F ACP DISCUSS/DSCN MKR DOCD: CPT | Performed by: NURSE PRACTITIONER

## 2023-03-06 PROCEDURE — 3078F DIAST BP <80 MM HG: CPT | Performed by: NURSE PRACTITIONER

## 2023-03-06 PROCEDURE — 1036F TOBACCO NON-USER: CPT | Performed by: NURSE PRACTITIONER

## 2023-03-06 PROCEDURE — 3017F COLORECTAL CA SCREEN DOC REV: CPT | Performed by: NURSE PRACTITIONER

## 2023-03-06 PROCEDURE — 20610 DRAIN/INJ JOINT/BURSA W/O US: CPT | Performed by: NURSE PRACTITIONER

## 2023-03-06 RX ORDER — TRIAMCINOLONE ACETONIDE 40 MG/ML
40 INJECTION, SUSPENSION INTRA-ARTICULAR; INTRAMUSCULAR ONCE
Status: COMPLETED | OUTPATIENT
Start: 2023-03-06 | End: 2023-03-06

## 2023-03-06 RX ADMIN — TRIAMCINOLONE ACETONIDE 40 MG: 40 INJECTION, SUSPENSION INTRA-ARTICULAR; INTRAMUSCULAR at 08:50

## 2023-03-06 ASSESSMENT — PATIENT HEALTH QUESTIONNAIRE - PHQ9
4. FEELING TIRED OR HAVING LITTLE ENERGY: 0
2. FEELING DOWN, DEPRESSED OR HOPELESS: 0
10. IF YOU CHECKED OFF ANY PROBLEMS, HOW DIFFICULT HAVE THESE PROBLEMS MADE IT FOR YOU TO DO YOUR WORK, TAKE CARE OF THINGS AT HOME, OR GET ALONG WITH OTHER PEOPLE: 0
SUM OF ALL RESPONSES TO PHQ9 QUESTIONS 1 & 2: 0
7. TROUBLE CONCENTRATING ON THINGS, SUCH AS READING THE NEWSPAPER OR WATCHING TELEVISION: 0
SUM OF ALL RESPONSES TO PHQ QUESTIONS 1-9: 0
SUM OF ALL RESPONSES TO PHQ QUESTIONS 1-9: 0
9. THOUGHTS THAT YOU WOULD BE BETTER OFF DEAD, OR OF HURTING YOURSELF: 0
8. MOVING OR SPEAKING SO SLOWLY THAT OTHER PEOPLE COULD HAVE NOTICED. OR THE OPPOSITE, BEING SO FIGETY OR RESTLESS THAT YOU HAVE BEEN MOVING AROUND A LOT MORE THAN USUAL: 0
6. FEELING BAD ABOUT YOURSELF - OR THAT YOU ARE A FAILURE OR HAVE LET YOURSELF OR YOUR FAMILY DOWN: 0
5. POOR APPETITE OR OVEREATING: 0
SUM OF ALL RESPONSES TO PHQ QUESTIONS 1-9: 0
SUM OF ALL RESPONSES TO PHQ QUESTIONS 1-9: 0
3. TROUBLE FALLING OR STAYING ASLEEP: 0
1. LITTLE INTEREST OR PLEASURE IN DOING THINGS: 0

## 2023-03-06 ASSESSMENT — ENCOUNTER SYMPTOMS
SHORTNESS OF BREATH: 0
VOMITING: 0
EYE DISCHARGE: 0
COUGH: 0
DIARRHEA: 0
RHINORRHEA: 0
ABDOMINAL PAIN: 0
CONSTIPATION: 0
CHEST TIGHTNESS: 0

## 2023-03-06 NOTE — PROGRESS NOTES
After obtaining consent, and per orders of Blessing Herrera CNP, injection of 1mL IA Kenalog 40mg and 1mL IA Lidocaine 1% given in Left Shoulder by Alfa Santiago CNP. Patient instructed to remain in clinic for 20 minutes afterwards, and to report any adverse reaction to me immediately. Administrations This Visit       triamcinolone acetonide (KENALOG-40) injection 40 mg       Admin Date  03/06/2023  08:50 Action  Given Dose  40 mg Route  Intra-artICUlar Site  Shoulder Left Administered By  Lashonda Werner LPN    Ordering Provider: HEVER Nguyen CNP    NDC: 7622-9851-67    Lot#: [id number]    : [employer] [place] (PRIMARY CARE)    Patient Supplied?: No    Comments: Injection given by Alfa Santiago CNP                  Patient tolerated well.

## 2023-03-06 NOTE — PROGRESS NOTES
[facility]bey [facility] [address] [facility]. [facility] [address]  Dept: [phone]  Dept Fax: [phone]    Visit type: Established patient    Reason for Visit: Injections (Left Shoulder injection) and Health Maintenance (Tdap Vaccine; Shingles Vaccine; Covid Booster; Flu Vaccine)         Assessment and Plan       1. Arthritis of left shoulder region  -     triamcinolone acetonide (KENALOG-40) injection 40 mg; 40 mg, Intra-artICUlar, ONCE, 1 dose, On Mon 3/6/23 at 0900  2. Essential hypertension  3. Stress reaction    HCTZ may be added at follow up visit, if he is getting 140/90 consistently at home will call this in prior to follow up apt  Work on lifestyle changes    Time out procedure completed. Cleansed with betadine and ethyl chloride use. A steroid injection was performed at left shoulder using 1% plain Lidocaine and 40 mg of Kenalog. Triple antibiotic and steroid applied. This was well tolerated. Return in about 3 months (around 6/6/2023) for HTN, chronic issues. Subjective       HTN  Onset over year ago  Is not monitor at home  Is not getting migraines and this is when he can tell it is elevated at home  Is stressed with being [employer], and is going to be retiring from this    Left shoulder pain  Onset over a year ago  When to PT and went to [facility]  Had injection in the past and would like to have that again  Has been over 3 month        Review of Systems   Constitutional:  Negative for activity change, appetite change and fever. HENT:  Negative for congestion, ear pain and rhinorrhea. Eyes:  Negative for discharge and visual disturbance. Respiratory:  Negative for cough, chest tightness and shortness of breath. Cardiovascular:  Negative for chest pain and palpitations. Gastrointestinal:  Negative for abdominal pain, constipation, diarrhea and vomiting. Genitourinary:  Negative for difficulty urinating and hematuria.    Musculoskeletal:  Positive for arthralgias and myalgias. Skin:  Negative for rash. Neurological:  Negative for dizziness, weakness, numbness and headaches. Psychiatric/Behavioral:  The patient is not nervous/anxious. Allergies   Allergen Reactions    Shellfish-Derived Products        Outpatient Medications Prior to Visit   Medication Sig Dispense Refill    gabapentin (NEURONTIN) 100 MG capsule Take 1 capsule by mouth nightly for 90 days. 90 capsule 1    ibuprofen (IBU) 800 MG tablet Take 1 tablet by mouth every 8 hours as needed for Pain Take with food. 30 tablet 1    Handicap Placard MISC by Does not apply route Expires 5 year from date of prescription 1 each 0    amLODIPine (NORVASC) 10 MG tablet Take 1 tablet by mouth once daily 90 tablet 2    levothyroxine (EUTHYROX) 25 MCG tablet TAKE 1 TABLET BY MOUTH ONCE DAILY WITH  WATER  ONLY  ON  AN  EMPTY  STOMACH  WAIT  30  MINUTES  BEFORE  EATING  OR  TAKING  OTHER  MEDS 90 tablet 2    fenofibrate (TRIGLIDE) 160 MG tablet Take 1 tablet by mouth daily 90 tablet 3    atorvastatin (LIPITOR) 80 MG tablet Take 1 tablet by mouth nightly 90 tablet 3    omega-3 acid ethyl esters (LOVAZA) 1 g capsule Take 2 capsules by mouth 2 times daily 60 capsule 5    aspirin 81 MG tablet Take 81 mg by mouth daily       No facility-administered medications prior to visit.         Past Medical History:   Diagnosis Date    Ankylosing spondylitis ([facility] [address]     Current moderate episode of major depressive disorder without prior episode ([facility] [address] 9/19/2019    Migraine     TIA (transient ischemic attack)         Social History     Tobacco Use    Smoking status: Never    Smokeless tobacco: Never   Substance Use Topics    Alcohol use: Yes     Comment: social        Past Surgical History:   Procedure Laterality Date    KNEE SURGERY  1983    both knees     MYRINGOTOMY AND TYMPANOSTOMY TUBE PLACEMENT Left 04/16/2013    Dr Shaan Mullins     MYRINGOTOMY AND TYMPANOSTOMY TUBE PLACEMENT Bilateral 01/18/2022    in office with Dr. Nidia Valle Family History   Problem Relation Age of Onset    Arthritis Mother        Objective       BP (!) 146/74 (Site: Right Upper Arm, Position: Sitting, Cuff Size: Large Adult)   Pulse 61   Temp 97.6 °F (36.4 °C) (Temporal)   Resp 18   Wt 177 lb 9.6 oz (80.6 kg)   SpO2 98%   BMI 29.06 kg/m²   Physical Exam  Constitutional:       General: He is not in acute distress. Appearance: He is well-developed. Interventions: He is not intubated. HENT:      Head: Normocephalic and atraumatic. Right Ear: External ear normal.      Left Ear: External ear normal.   Eyes:      General: Lids are normal.      Conjunctiva/sclera: Conjunctivae normal.   Pulmonary:      Effort: No tachypnea, bradypnea, prolonged expiration, respiratory distress or retractions. He is not intubated. Musculoskeletal:      Left shoulder: Tenderness and bony tenderness present. Cervical back: Normal range of motion. Skin:     Coloration: Skin is not pale. Findings: No erythema or rash. Neurological:      Mental Status: He is alert and oriented to person, place, and time. Psychiatric:         Attention and Perception: Attention and perception normal.         Mood and Affect: Mood and affect normal.         Speech: Speech normal.         Behavior: Behavior normal. Behavior is cooperative. Thought Content:  Thought content normal.         Cognition and Memory: Cognition and memory normal.         Judgment: Judgment normal.         Data Reviewed and Summarized       Labs:     Imaging/Testing:        Kia Gardner, APRN - CNP

## 2023-03-07 ENCOUNTER — HOSPITAL ENCOUNTER (OUTPATIENT)
Dept: AUDIOLOGY | Age: 68
Discharge: HOME | End: 2023-03-07

## 2023-03-07 PROCEDURE — 9990000010 HC NO CHARGE VISIT: Performed by: AUDIOLOGIST

## 2023-03-07 NOTE — PROGRESS NOTES
TWO WEEK CHECK: The patient is doing well with the new hearing aids. Natali Flash does not report any problems. Instructed patient how to manage is notification sounds and key sounds. Paired his hearing aids to the [employer] barb and instructed him on the use. He is very pleased. Scheduled annual hearing aid check. Will see patient sooner if any problems arise.

## 2023-03-10 ENCOUNTER — HOSPITAL ENCOUNTER (EMERGENCY)
Age: 68
Discharge: HOME | End: 2023-03-10
Attending: EMERGENCY MEDICINE
Payer: MEDICARE

## 2023-03-10 ENCOUNTER — APPOINTMENT (OUTPATIENT)
Dept: GENERAL RADIOLOGY | Age: 68
End: 2023-03-10
Payer: MEDICARE

## 2023-03-10 VITALS
HEIGHT: 65 IN | WEIGHT: 177 LBS | SYSTOLIC BLOOD PRESSURE: 142 MMHG | TEMPERATURE: 98 F | HEART RATE: 64 BPM | OXYGEN SATURATION: 96 % | RESPIRATION RATE: 18 BRPM | BODY MASS INDEX: 29.49 KG/M2 | DIASTOLIC BLOOD PRESSURE: 78 MMHG

## 2023-03-10 DIAGNOSIS — J18.9 PNEUMONIA OF LEFT LOWER LOBE DUE TO INFECTIOUS ORGANISM: ICD-10-CM

## 2023-03-10 DIAGNOSIS — R07.9 CHEST PAIN, UNSPECIFIED TYPE: Primary | ICD-10-CM

## 2023-03-10 LAB
ANION GAP SERPL CALC-SCNC: 11 MEQ/L (ref 8–16)
BASOPHILS ABSOLUTE: 0.1 THOU/MM3 (ref 0–0.1)
BASOPHILS NFR BLD AUTO: 0.5 %
BUN SERPL-MCNC: 17 MG/DL (ref 7–22)
CALCIUM SERPL-MCNC: 9.2 MG/DL (ref 8.5–10.5)
CHLORIDE SERPL-SCNC: 103 MEQ/L (ref 98–111)
CO2 SERPL-SCNC: 21 MEQ/L (ref 23–33)
CREAT SERPL-MCNC: 1 MG/DL (ref 0.4–1.2)
D DIMER PPP IA.FEU-MCNC: 413 NG/ML FEU (ref 0–500)
DEPRECATED RDW RBC AUTO: 38.9 FL (ref 35–45)
EOSINOPHIL NFR BLD AUTO: 0.9 %
EOSINOPHILS ABSOLUTE: 0.1 THOU/MM3 (ref 0–0.4)
ERYTHROCYTE [DISTWIDTH] IN BLOOD BY AUTOMATED COUNT: 12.7 % (ref 11.5–14.5)
GFR SERPL CREATININE-BSD FRML MDRD: > 60 ML/MIN/1.73M2
GLUCOSE SERPL-MCNC: 105 MG/DL (ref 70–108)
HCT VFR BLD AUTO: 45.3 % (ref 42–52)
HGB BLD-MCNC: 15.3 GM/DL (ref 14–18)
IMM GRANULOCYTES # BLD AUTO: 0.11 THOU/MM3 (ref 0–0.07)
IMM GRANULOCYTES NFR BLD AUTO: 1 %
LIPASE SERPL-CCNC: 36 U/L (ref 5.6–51.3)
LYMPHOCYTES ABSOLUTE: 2.5 THOU/MM3 (ref 1–4.8)
LYMPHOCYTES NFR BLD AUTO: 22.5 %
MCH RBC QN AUTO: 28.5 PG (ref 26–33)
MCHC RBC AUTO-ENTMCNC: 33.8 GM/DL (ref 32.2–35.5)
MCV RBC AUTO: 84.4 FL (ref 80–94)
MONOCYTES ABSOLUTE: 1 THOU/MM3 (ref 0.4–1.3)
MONOCYTES NFR BLD AUTO: 9.1 %
NEUTROPHILS NFR BLD AUTO: 66 %
NRBC BLD AUTO-RTO: 0 /100 WBC
OSMOLALITY SERPL CALC.SUM OF ELEC: 272 MOSMOL/KG (ref 275–300)
PLATELET # BLD AUTO: 321 THOU/MM3 (ref 130–400)
PMV BLD AUTO: 10.9 FL (ref 9.4–12.4)
POTASSIUM SERPL-SCNC: 4.1 MEQ/L (ref 3.5–5.2)
RBC # BLD AUTO: 5.37 MILL/MM3 (ref 4.7–6.1)
SEGMENTED NEUTROPHILS ABSOLUTE COUNT: 7.3 THOU/MM3 (ref 1.8–7.7)
SODIUM SERPL-SCNC: 135 MEQ/L (ref 135–145)
TROPONIN T: < 0.01 NG/ML
TROPONIN T: < 0.01 NG/ML
WBC # BLD AUTO: 11 THOU/MM3 (ref 4.8–10.8)

## 2023-03-10 PROCEDURE — 85379 FIBRIN DEGRADATION QUANT: CPT

## 2023-03-10 PROCEDURE — 71045 X-RAY EXAM CHEST 1 VIEW: CPT

## 2023-03-10 PROCEDURE — 93010 ELECTROCARDIOGRAM REPORT: CPT | Performed by: INTERNAL MEDICINE

## 2023-03-10 PROCEDURE — 84484 ASSAY OF TROPONIN QUANT: CPT

## 2023-03-10 PROCEDURE — 85025 COMPLETE CBC W/AUTO DIFF WBC: CPT

## 2023-03-10 PROCEDURE — 93005 ELECTROCARDIOGRAM TRACING: CPT | Performed by: EMERGENCY MEDICINE

## 2023-03-10 PROCEDURE — 83690 ASSAY OF LIPASE: CPT

## 2023-03-10 PROCEDURE — 80048 BASIC METABOLIC PNL TOTAL CA: CPT

## 2023-03-10 PROCEDURE — 36415 COLL VENOUS BLD VENIPUNCTURE: CPT

## 2023-03-10 PROCEDURE — 99285 EMERGENCY DEPT VISIT HI MDM: CPT

## 2023-03-10 RX ORDER — DOXYCYCLINE HYCLATE 100 MG
100 TABLET ORAL 2 TIMES DAILY
Qty: 20 TABLET | Refills: 0 | Status: SHIPPED | OUTPATIENT
Start: 2023-03-10 | End: 2023-03-20

## 2023-03-10 RX ORDER — AMOXICILLIN AND CLAVULANATE POTASSIUM 875; 125 MG/1; MG/1
1 TABLET, FILM COATED ORAL 2 TIMES DAILY
Qty: 20 TABLET | Refills: 0 | Status: SHIPPED | OUTPATIENT
Start: 2023-03-10 | End: 2023-03-20

## 2023-03-10 NOTE — DISCHARGE INSTRUCTIONS
You were seen for pneumonia. At this time you are stable for discharge. Please follow-up with primary care within the next few days. Please return to ED if any worsening of condition.

## 2023-03-10 NOTE — ED NOTES
Patient and family updated on plan of care, no needs voiced at this time.       Shiv Avalos RN  03/10/23 3315

## 2023-03-10 NOTE — ED PROVIDER NOTES
[address] [facility] [address] [address] EMERGENCY DEPT      EMERGENCY MEDICINE     Pt Name: Radha Ravi  MRN: [id number]  [place] 1955  Date of evaluation: 3/10/2023  Provider: Garrett Hines MD  Supervising Physician: [address] [place] [address]       Chief Complaint   Patient presents with    Chest Pain    Shortness of Breath     History obtained from the patient. HISTORY OF PRESENT ILLNESS   Radha Ravi is a pleasant 75 y.o. male who presents to the emergency department from from home, brought in by EMS for evaluation of chest pain. Pt has history of GERD, but states that this feels different. States that he has had a cough for the past few days, but has felt fine. Drank a couple cups of coffee this morning, went to a meeting and began to have 6/10 chest pain, aching, left sided, not worse with exertion, position, or inspiration. Denies any additional complaints. Denies fever, chills, nausea, vomiting, back pain. Pertinent ROS included above.   PASTMEDICAL HISTORY     Past Medical History:   Diagnosis Date    Ankylosing spondylitis ([facility] [address]     Current moderate episode of major depressive disorder without prior episode ([address] 9/19/2019    Migraine     TIA (transient ischemic attack)        Patient Active Problem List   Diagnosis Code    TIA involving carotid artery G45.1    Peripheral neuropathy G62.9    Vitamin B12 deficiency neuropathy (HCC) E53.8, G63    Hypercholesterolemia with hypertriglyceridemia E78.2    Ankylosing spondylitis ([facility] [address] M45.9    Dental caries K02.9    Essential hypertension I10    Hypothyroidism E03.9    Bilateral hearing loss H91.93    Dry eye H04.129    Intercostal muscle strain S29.011A    Intercostal pain  R07.82    Eccentric opening of tricuspid aortic valve 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 Arminda Baird       Discharge Medication List as of 3/10/2023  2:17 PM        CONTINUE these medications which have NOT CHANGED    Details   gabapentin (NEURONTIN) 100 MG capsule Take 1 capsule by mouth nightly for 90 days. , Disp-90 capsule, R-1Normal      ibuprofen (IBU) 800 MG tablet Take 1 tablet by mouth every 8 hours as needed for Pain Take with food. , Disp-30 tablet, R-1Normal      Handicap Placard MISC Starting Thu 8/25/2022, Disp-1 each, R-0, PrintExpires 5 year from date of prescription      amLODIPine (NORVASC) 10 MG tablet Take 1 tablet by mouth once daily, Disp-90 tablet, R-2Normal      levothyroxine (EUTHYROX) 25 MCG tablet TAKE 1 TABLET BY MOUTH ONCE DAILY WITH  WATER  ONLY  ON  AN  EMPTY  STOMACH  WAIT  30  MINUTES  BEFORE  EATING  OR  TAKING  OTHER  MEDS, Disp-90 tablet, R-2Normal      fenofibrate (TRIGLIDE) 160 MG tablet Take 1 tablet by mouth daily, Disp-90 tablet, R-3Normal      atorvastatin (LIPITOR) 80 MG tablet Take 1 tablet by mouth nightly, Disp-90 tablet, R-3Normal      omega-3 acid ethyl esters (LOVAZA) 1 g capsule Take 2 capsules by mouth 2 times daily, Disp-60 capsule, R-5Normal      aspirin 81 MG tablet Take 81 mg by mouth daily             ALLERGIES     is allergic to shellfish-derived products. FAMILY HISTORY     He indicated that his mother is alive. He indicated that his father is alive.        SOCIAL HISTORY       Social History     Tobacco Use    Smoking status: Never    Smokeless tobacco: Never   Substance Use Topics    Alcohol use: Yes     Comment: social    Drug use: No       PHYSICAL EXAM       ED Triage Vitals [03/10/23 1042]   BP Temp Temp src Heart Rate Resp SpO2 Height Weight   (!) 128/94 98 °F (36.7 °C) -- 70 16 96 % 5' 5\" (1.651 m) 177 lb (80.3 kg)       Additional Vital Signs:  Vitals:    03/10/23 1345   BP: (!) 142/78   Pulse: 64   Resp: 18   Temp:    SpO2: 96%     Physical Exam  Constitutional:       Appearance: He is well-developed. He is not ill-appearing. HENT:      Head: Normocephalic and atraumatic. Mouth/Throat:      Mouth: Mucous membranes are moist.   Eyes:      Extraocular Movements: Extraocular movements intact. Cardiovascular:      Rate and Rhythm: Normal rate and regular rhythm. Pulmonary:      Effort: Pulmonary effort is normal. No tachypnea, accessory muscle usage or respiratory distress. Breath sounds: Examination of the left-middle field reveals rales. Examination of the left-lower field reveals rales. Rales present. Abdominal:      Palpations: Abdomen is soft. Tenderness: There is no abdominal tenderness. There is no guarding or rebound. Musculoskeletal:      Right lower leg: No tenderness. No edema. Left lower leg: No edema. Skin:     General: Skin is warm and dry. Capillary Refill: Capillary refill takes less than 2 seconds. Neurological:      General: No focal deficit present. Mental Status: He is alert and oriented to person, place, and time. FORMAL DIAGNOSTIC RESULTS     RADIOLOGY: Interpretation per the Radiologist below, if available at the time of this note (none if blank):    XR CHEST PORTABLE   Final Result   1. Left lower lobe patchy consolidation is noted as can seen with infiltrates. Clinical correlation is recommended. **This report has been created using voice recognition software. It may contain minor errors which are inherent in voice recognition technology. **      Final report electronically signed by Dr Sania Eugene on 3/10/2023 11:10 AM          LABS: (none if blank)  Labs Reviewed   BASIC METABOLIC PANEL W/ REFLEX TO MG FOR LOW K - Abnormal; Notable for the following components:       Result Value    CO2 21 (*)     All other components within normal limits   CBC WITH AUTO DIFFERENTIAL - Abnormal; Notable for the following components:    WBC 11.0 (*)     Immature Grans (Abs) 0.11 (*) All other components within normal limits   OSMOLALITY - Abnormal; Notable for the following components:    Osmolality Calc 272.0 (*)     All other components within normal limits   TROPONIN   D-DIMER, QUANTITATIVE   LIPASE   ANION GAP   GLOMERULAR FILTRATION RATE, ESTIMATED   TROPONIN       (Any cultures that may have been sent were not resulted at the time of this patient visit)    [facility] / ED COURSE:     Assessment and Plan: This is a 75 y.o. male with  significant past medical history HTN, HLD, presenting with nonexertional CP. Physical exam significant for  rales in left base. Differential diagnoses include, but not limited to ACS, PNA, COPD, unlikely to be dissection or PEE. EKG shows rate 71, , . Labs significant for mild leukocytosis, trop x 2 negative. Imaging significant for left lower pneumonia. DC to home with strict return precautions, follow up, and antibiotics. ED Reassessment:  remains stable on reassessment          Shared Decision-Making was performed and disposition discussed with the        Patient/Family and questions answered                Vitals Reviewed:    Vitals:    03/10/23 1042 03/10/23 1101 03/10/23 1220 03/10/23 1345   BP: (!) 128/94 (!) 147/71 138/76 (!) 142/78   Pulse: 70 68 66 64   Resp: 16 20 20 18   Temp: 98 °F (36.7 °C)      SpO2: 96% 95% 94% 96%   Weight: 177 lb (80.3 kg)      Height: 5' 5\" (1.651 m)          The patient was seen and examined. Appropriate diagnostic testing was performed and results reviewed with the patient. Nursing notes reviewed. The results of pertinent diagnostic studies and exam findings were discussed. The patients provisional diagnosis and plan of care were discussed with the patient and present family who expressed understanding. Any medications were reviewed and indications and risks of medications were discussed with the patient /family present.      ED Medications administered this visit:  (None if blank)  Medications - No data to display      DISCHARGE PRESCRIPTIONS: (None if blank)  Discharge Medication List as of 3/10/2023  2:17 PM        START taking these medications    Details   doxycycline hyclate (VIBRA-TABS) 100 MG tablet Take 1 tablet by mouth 2 times daily for 10 days, Disp-20 tablet, R-0Normal      amoxicillin-clavulanate (AUGMENTIN) 875-125 MG per tablet Take 1 tablet by mouth 2 times daily for 10 days, Disp-20 tablet, R-0Normal             FINAL IMPRESSION      1. Chest pain, unspecified type    2. Pneumonia of left lower lobe due to infectious organism          DISPOSITION/PLAN   Condition: condition: good  Dispo: Discharge to home        [address] [place]:  PCP  No follow-up provider specified. Strict return precautions and follow-up discussed with patient. This transcription was electronically signed. Parts of this transcriptions may have been dictated by use of voice recognition software and electronically transcribed, and parts may have been transcribed with the assistance of an ED scribe. The transcription may contain errors not detected in proofreading. Please refer to my supervising physician's documentation if my documentation differs.     Electronically Signed: Richard Pradhan MD, 03/12/23, 6:08 Lucius Briggs MD  Resident  03/12/23 1730

## 2023-03-10 NOTE — ED TRIAGE NOTES
Patient to room 5 via [facility]. Per report, patient was experiencing some chest pain and shortness of breath this morning and stopped at the EMS station to be checked out. Initially, patient did not want to be transported and left but soon returned because he was feeling so poorly. Patient reports a history of hypertension but has never felt like this. EKG completed at bedside, patient arrives with IV in place.

## 2023-03-12 LAB
EKG ATRIAL RATE: 71 BPM
EKG P AXIS: 55 DEGREES
EKG P-R INTERVAL: 218 MS
EKG Q-T INTERVAL: 390 MS
EKG QRS DURATION: 104 MS
EKG QTC CALCULATION (BAZETT): 423 MS
EKG R AXIS: -21 DEGREES
EKG T AXIS: 52 DEGREES
EKG VENTRICULAR RATE: 71 BPM

## 2023-03-13 DIAGNOSIS — E03.9 HYPOTHYROIDISM, UNSPECIFIED TYPE: ICD-10-CM

## 2023-03-13 NOTE — TELEPHONE ENCOUNTER
Patient calling in stating he needs a refill of Levothyroxine 25mcg sent to [facility] [address] Appt. 3/6/2023   Next Appt.  3/13/2023   RX Pended

## 2023-03-14 RX ORDER — LEVOTHYROXINE SODIUM 0.03 MG/1
TABLET ORAL
Qty: 90 TABLET | Refills: 2 | Status: SHIPPED | OUTPATIENT
Start: 2023-03-14

## 2023-03-15 ENCOUNTER — OFFICE VISIT (OUTPATIENT)
Dept: FAMILY MEDICINE CLINIC | Age: 68
End: 2023-03-15

## 2023-03-15 VITALS
RESPIRATION RATE: 18 BRPM | BODY MASS INDEX: 29.22 KG/M2 | HEART RATE: 64 BPM | TEMPERATURE: 97.1 F | DIASTOLIC BLOOD PRESSURE: 72 MMHG | OXYGEN SATURATION: 96 % | WEIGHT: 175.6 LBS | SYSTOLIC BLOOD PRESSURE: 138 MMHG

## 2023-03-15 DIAGNOSIS — I10 ESSENTIAL HYPERTENSION: ICD-10-CM

## 2023-03-15 DIAGNOSIS — J18.9 PNEUMONIA OF LEFT LOWER LOBE DUE TO INFECTIOUS ORGANISM: Primary | ICD-10-CM

## 2023-03-15 ASSESSMENT — ENCOUNTER SYMPTOMS
ABDOMINAL PAIN: 0
CONSTIPATION: 0
VOMITING: 0
RHINORRHEA: 0
COUGH: 0
SHORTNESS OF BREATH: 0
DIARRHEA: 0
EYE DISCHARGE: 0
CHEST TIGHTNESS: 0

## 2023-03-15 NOTE — PROGRESS NOTES
[facility] [address] [facility], [facility] [place]. [facility] [address]  Dept: [phone]  Dept Fax: [phone]    Visit type: Established patient    Reason for Visit: ED Follow-up (Pneumonia and high bp ) and Health Maintenance (Vaccines )         Assessment and Plan       1. Pneumonia of left lower lobe due to infectious organism  -     XR CHEST STANDARD (2 VW); Future  2. Essential hypertension    Continue probiotic  Will get chest xray when antibiotics are complete  Continue doxycycline and augmentin   Return for keep fu apt for June- repeat xray in 1 week . Subjective       HTN  Onset over year ago   monitor at home? - yes is getting higher readings   Is not getting migraines and this is when he can tell it is elevated at home  Is stressed with being [employer], and is going to be retiring from this  Current treatment norvasc  Hx of HLD- is taking lipitor, triglide and lovaza    Went to ER over the weekend for CP and was diagnosed with pneumonia  Given augmtin and doxycycline   Current symptoms - fatigued is persistent but is better, is not longer SOB or wheezing. Still has slight headache that is worse in the morning        Review of Systems   Constitutional:  Positive for fatigue. Negative for activity change, appetite change and fever. HENT:  Negative for congestion, ear pain and rhinorrhea. Eyes:  Negative for discharge and visual disturbance. Respiratory:  Negative for cough, chest tightness and shortness of breath. Cardiovascular:  Negative for chest pain and palpitations. Gastrointestinal:  Negative for abdominal pain, constipation, diarrhea and vomiting. Genitourinary:  Negative for difficulty urinating and hematuria. Musculoskeletal:  Negative for arthralgias and myalgias. Skin:  Negative for rash. Neurological:  Positive for headaches. Negative for dizziness, weakness and numbness. Psychiatric/Behavioral:  The patient is not nervous/anxious. Allergies   Allergen Reactions    Shellfish-Derived Products        Outpatient Medications Prior to Visit   Medication Sig Dispense Refill    levothyroxine (EUTHYROX) 25 MCG tablet TAKE 1 TABLET BY MOUTH ONCE DAILY WITH  WATER  ONLY  ON  AN  EMPTY  STOMACH  WAIT  30  MINUTES  BEFORE  EATING  OR  TAKING  OTHER  MEDS 90 tablet 2    doxycycline hyclate (VIBRA-TABS) 100 MG tablet Take 1 tablet by mouth 2 times daily for 10 days 20 tablet 0    amoxicillin-clavulanate (AUGMENTIN) 875-125 MG per tablet Take 1 tablet by mouth 2 times daily for 10 days 20 tablet 0    gabapentin (NEURONTIN) 100 MG capsule Take 1 capsule by mouth nightly for 90 days. 90 capsule 1    ibuprofen (IBU) 800 MG tablet Take 1 tablet by mouth every 8 hours as needed for Pain Take with food. 30 tablet 1    Handicap Placard MISC by Does not apply route Expires 5 year from date of prescription 1 each 0    amLODIPine (NORVASC) 10 MG tablet Take 1 tablet by mouth once daily 90 tablet 2    fenofibrate (TRIGLIDE) 160 MG tablet Take 1 tablet by mouth daily 90 tablet 3    atorvastatin (LIPITOR) 80 MG tablet Take 1 tablet by mouth nightly 90 tablet 3    omega-3 acid ethyl esters (LOVAZA) 1 g capsule Take 2 capsules by mouth 2 times daily 60 capsule 5    aspirin 81 MG tablet Take 81 mg by mouth daily       No facility-administered medications prior to visit.         Past Medical History:   Diagnosis Date    Ankylosing spondylitis ([facility]     Current moderate episode of major depressive disorder without prior episode ([facility] 9/19/2019    Migraine     TIA (transient ischemic attack)         Social History     Tobacco Use    Smoking status: Never    Smokeless tobacco: Never   Substance Use Topics    Alcohol use: Yes     Comment: social        Past Surgical History:   Procedure Laterality Date    KNEE SURGERY  1983    both knees     MYRINGOTOMY AND TYMPANOSTOMY TUBE PLACEMENT Left 04/16/2013    Dr Mona Campoverde Bilateral 01/18/2022    in office with Dr. Alexander Talavera History   Problem Relation Age of Onset    Arthritis Mother        Objective       /72   Pulse 64   Temp 97.1 °F (36.2 °C) (Temporal)   Resp 18   Wt 175 lb 9.6 oz (79.7 kg)   SpO2 96%   BMI 29.22 kg/m²   Physical Exam  Vitals reviewed. Constitutional:       Appearance: He is well-developed. HENT:      Head: Normocephalic and atraumatic. Right Ear: External ear normal.      Left Ear: External ear normal.   Eyes:      Conjunctiva/sclera: Conjunctivae normal.   Neck:      Thyroid: No thyromegaly. Trachea: Trachea normal.   Cardiovascular:      Rate and Rhythm: Normal rate and regular rhythm. Heart sounds: Normal heart sounds. No murmur heard. No friction rub. No gallop. Pulmonary:      Effort: Pulmonary effort is normal.      Breath sounds: Normal breath sounds. No wheezing. Abdominal:      General: Bowel sounds are normal.      Palpations: Abdomen is soft. Tenderness: There is no abdominal tenderness. Musculoskeletal:         General: Normal range of motion. Cervical back: Normal range of motion and neck supple. No spinous process tenderness. Skin:     General: Skin is warm and dry. Findings: No erythema or rash. Neurological:      Mental Status: He is alert and oriented to person, place, and time. Psychiatric:         Speech: Speech normal.         Behavior: Behavior normal.         Thought Content: Thought content normal.         Data Reviewed and Summarized       Labs:     Imaging/Testing:    Narrative   PROCEDURE: XR CHEST PORTABLE       CLINICAL INFORMATION: chest pain, SOB       COMPARISON: Chest radiograph 5/15/2020       TECHNIQUE: AP portable chest radiograph performed. FINDINGS:        Left lower lobe patchy consolidation is noted. Mild prominence of interstitial pulmonary markings. Cardiac silhouette is not enlarged. No pleural effusion.  No pneumothorax. No acute bony abnormality. Mild degenerative changes of the acromioclavicular joints. Impression   1. Left lower lobe patchy consolidation is noted as can seen with infiltrates. Clinical correlation is recommended. **This report has been created using voice recognition software. It may contain minor errors which are inherent in voice recognition technology. **       Final report electronically signed by Dr Thao Couch on 3/10/2023 11:10 AM         Narrative   Cardiac Perfusion Imaging        Demographics        Patient Name   Dorothy Gaytan      Gender                Male        MR #           [id number]          Race                                                            Ethnicity        Account #      [de-identified]          Room Number        Accession      [id number]          Date of study         06/12/2020    Number        Date of Birth  1955         Referring Physician   Bella Tavarez CNP        Age            76 year(s)         NM Technologist       Neil Romano                                                            [facility]        Stress Staff   [facility],  Interpreting          Velasquez Parra                   RRT                Cardiologist          MD        The procedure was explained in detail to the patient. Risks,    complications and alternative treatments were reviewed. Written consent    was obtained. Procedure   Procedure Type:        Nuclear Stress Test:Exercise, Exercise Cardiolite Stress       Indications: Chest pain. Medical History:Patient history obtained by: Christelle Kaplan RN. Chest pain status: chest pain . Risk Factors        The patient risk factors include:treated hypercholesterolemia and treated    hypertension. Stress Protocols        Resting ECG    Normal sinus rhythm.         Resting HR:67 bpm                Resting BP:153/77 mmHg       Stress Protocol:Exercise - Fede +--------+-----+----+-----------+-----+--------+---+--+--------+------+----+   ! Stage # ! Time ! Work! Speed/Grade! Heart! Blood   ! RPE!CP! Pain    ! Pain  ! Pain!   !        !     !    !           !Rate ! Pressure!   ! !Location! Action! Type!   +--------+-----+----+-----------+-----+--------+---+--+--------+------+----+   !1.0     !03:00!4.7 !1.7/10.0   !88   !168/80  !   !  !        !      !    !   +--------+-----+----+-----------+-----+--------+---+--+--------+------+----+   !2.0     ! 06:00!7.1 !2.5/12.0   !112  !170/90  !   !  !        !      !    !   +--------+-----+----+-----------+-----+--------+---+--+--------+------+----+   !3.0     ! 09:00!10.1!3.4/14.0   !131  !        !   !  !        !      !    !   +--------+-----+----+-----------+-----+--------+---+--+--------+------+----+   ! Recovery! 00:00!    !           !132  ! 208/80  !   !  !        !      !    !   +--------+-----+----+-----------+-----+--------+---+--+--------+------+----+   ! Recovery! 02:00!    !           !80   !180/80  !   !  !        !      !    !   +--------+-----+----+-----------+-----+--------+---+--+--------+------+----+   ! Recovery! 05:00!    !           !81   !142/70  !   !  !        !      !    !   +--------+-----+----+-----------+-----+--------+---+--+--------+------+----+        Peak HR:132 bpm                 HR response: Appropriate    Peak BP:208/80 mmHg             BP response: Normal Resting BP with    Predicted HR: 154 bpm           hypertensive response to Stress    % of predicted HR: 86           HR/BP product:[id number]    Test duration:8 min             Max exercise: 10.1 METS    Reason for termination:Target    HR achieved        ECG Findings    Nonspecific ST-T wave changes. Complications    Procedure complication was none.         Imaging Protocols        Rest                                Stress        Isotope:Tc-99m Sestamibi            Isotope: Tc-99m Sestamibi    Isotope dose:9.7 mCi                Isotope dose:34.7 mCi Date:06/12/2020 14:00               Date:06/12/2020 15:00        Technique:           SPECT          Technique:           Gated                                                             SPECT       Imaging Results:Calculated gated LVEF 62 %. This study was negative for ischemia. normal EF        Conclusions        Summary    This Nuclear Medicine study was negative for ischemia . normal EF        Recommendation    Medical management. Signatures        ----------------------------------------------------------------    Electronically signed by Mal Villarreal MD (Interpreting    Cardiologist) on 06/12/2020 at 17:59    ----------------------------------------------------------------       Medical History        Accession#:                           [id number]       Admission Data   Admission date: 06/12/2020 Admission Time: 12:29       Hospital Status: Outpatient. [id number]   [id number]       HEVER Jefferson CNP

## 2023-05-31 DIAGNOSIS — G62.9 PERIPHERAL POLYNEUROPATHY: ICD-10-CM

## 2023-05-31 DIAGNOSIS — I10 ESSENTIAL HYPERTENSION: ICD-10-CM

## 2023-05-31 DIAGNOSIS — M25.512 CHRONIC LEFT SHOULDER PAIN: ICD-10-CM

## 2023-05-31 DIAGNOSIS — G89.29 CHRONIC LEFT SHOULDER PAIN: ICD-10-CM

## 2023-05-31 RX ORDER — AMLODIPINE BESYLATE 10 MG/1
TABLET ORAL
Qty: 90 TABLET | Refills: 2 | Status: SHIPPED | OUTPATIENT
Start: 2023-05-31

## 2023-05-31 RX ORDER — FENOFIBRATE 160 MG/1
160 TABLET ORAL DAILY
Qty: 90 TABLET | Refills: 3 | Status: SHIPPED | OUTPATIENT
Start: 2023-05-31

## 2023-05-31 RX ORDER — GABAPENTIN 100 MG/1
100 CAPSULE ORAL NIGHTLY
Qty: 90 CAPSULE | Refills: 1 | Status: SHIPPED | OUTPATIENT
Start: 2023-05-31 | End: 2023-08-29

## 2023-08-07 ENCOUNTER — APPOINTMENT (OUTPATIENT)
Dept: GENERAL RADIOLOGY | Age: 68
End: 2023-08-07
Payer: MEDICARE

## 2023-08-07 ENCOUNTER — HOSPITAL ENCOUNTER (OUTPATIENT)
Age: 68
Setting detail: OBSERVATION
Discharge: HOME | End: 2023-08-09
Attending: EMERGENCY MEDICINE | Admitting: ORTHOPAEDIC SURGERY
Payer: MEDICARE

## 2023-08-07 DIAGNOSIS — M00.9 PYOGENIC ARTHRITIS OF RIGHT KNEE JOINT, DUE TO UNSPECIFIED ORGANISM (HCC): ICD-10-CM

## 2023-08-07 DIAGNOSIS — M25.561 ACUTE PAIN OF RIGHT KNEE: Primary | ICD-10-CM

## 2023-08-07 LAB
ALBUMIN SERPL BCG-MCNC: 3.9 G/DL (ref 3.5–5.1)
ALP SERPL-CCNC: 56 U/L (ref 38–126)
ALT SERPL W/O P-5'-P-CCNC: 18 U/L (ref 11–66)
ANION GAP SERPL CALC-SCNC: 12 MEQ/L (ref 8–16)
AST SERPL-CCNC: 27 U/L (ref 5–40)
BASOPHILS ABSOLUTE: 0.1 THOU/MM3 (ref 0–0.1)
BASOPHILS NFR BLD AUTO: 0.5 %
BILIRUB SERPL-MCNC: 0.7 MG/DL (ref 0.3–1.2)
BUN SERPL-MCNC: 18 MG/DL (ref 7–22)
CALCIUM SERPL-MCNC: 9.3 MG/DL (ref 8.5–10.5)
CHLORIDE SERPL-SCNC: 105 MEQ/L (ref 98–111)
CO2 SERPL-SCNC: 19 MEQ/L (ref 23–33)
CREAT SERPL-MCNC: 1 MG/DL (ref 0.4–1.2)
DEPRECATED RDW RBC AUTO: 41.2 FL (ref 35–45)
EOSINOPHIL NFR BLD AUTO: 1.7 %
EOSINOPHILS ABSOLUTE: 0.2 THOU/MM3 (ref 0–0.4)
ERYTHROCYTE [DISTWIDTH] IN BLOOD BY AUTOMATED COUNT: 13.3 % (ref 11.5–14.5)
ERYTHROCYTE [SEDIMENTATION RATE] IN BLOOD BY WESTERGREN METHOD: 225 MM/HR (ref 0–10)
GFR SERPL CREATININE-BSD FRML MDRD: > 60 ML/MIN/1.73M2
GLUCOSE SERPL-MCNC: 113 MG/DL (ref 70–108)
HCT VFR BLD AUTO: 43.6 % (ref 42–52)
HGB BLD-MCNC: 14.3 GM/DL (ref 14–18)
IMM GRANULOCYTES # BLD AUTO: 0.04 THOU/MM3 (ref 0–0.07)
IMM GRANULOCYTES NFR BLD AUTO: 0.3 %
LYMPHOCYTES ABSOLUTE: 1.6 THOU/MM3 (ref 1–4.8)
LYMPHOCYTES NFR BLD AUTO: 13.7 %
MCH RBC QN AUTO: 28.4 PG (ref 26–33)
MCHC RBC AUTO-ENTMCNC: 32.8 GM/DL (ref 32.2–35.5)
MCV RBC AUTO: 86.5 FL (ref 80–94)
MONOCYTES ABSOLUTE: 1.9 THOU/MM3 (ref 0.4–1.3)
MONOCYTES NFR BLD AUTO: 16.2 %
NEUTROPHILS NFR BLD AUTO: 67.6 %
NRBC BLD AUTO-RTO: 0 /100 WBC
OSMOLALITY SERPL CALC.SUM OF ELEC: 274.7 MOSMOL/KG (ref 275–300)
PLATELET # BLD AUTO: 245 THOU/MM3 (ref 130–400)
PMV BLD AUTO: 10.9 FL (ref 9.4–12.4)
POTASSIUM SERPL-SCNC: 4.1 MEQ/L (ref 3.5–5.2)
PROT SERPL-MCNC: 7.3 G/DL (ref 6.1–8)
RBC # BLD AUTO: 5.04 MILL/MM3 (ref 4.7–6.1)
SEGMENTED NEUTROPHILS ABSOLUTE COUNT: 8 THOU/MM3 (ref 1.8–7.7)
SODIUM SERPL-SCNC: 136 MEQ/L (ref 135–145)
WBC # BLD AUTO: 11.8 THOU/MM3 (ref 4.8–10.8)

## 2023-08-07 PROCEDURE — 85651 RBC SED RATE NONAUTOMATED: CPT

## 2023-08-07 PROCEDURE — 89050 BODY FLUID CELL COUNT: CPT

## 2023-08-07 PROCEDURE — 20610 DRAIN/INJ JOINT/BURSA W/O US: CPT

## 2023-08-07 PROCEDURE — 96374 THER/PROPH/DIAG INJ IV PUSH: CPT

## 2023-08-07 PROCEDURE — 96365 THER/PROPH/DIAG IV INF INIT: CPT

## 2023-08-07 PROCEDURE — 87205 SMEAR GRAM STAIN: CPT

## 2023-08-07 PROCEDURE — 80053 COMPREHEN METABOLIC PANEL: CPT

## 2023-08-07 PROCEDURE — 6360000002 HC RX W HCPCS

## 2023-08-07 PROCEDURE — 73564 X-RAY EXAM KNEE 4 OR MORE: CPT

## 2023-08-07 PROCEDURE — 6360000002 HC RX W HCPCS: Performed by: ORTHOPAEDIC SURGERY

## 2023-08-07 PROCEDURE — G0378 HOSPITAL OBSERVATION PER HR: HCPCS

## 2023-08-07 PROCEDURE — 89060 EXAM SYNOVIAL FLUID CRYSTALS: CPT

## 2023-08-07 PROCEDURE — 2500000003 HC RX 250 WO HCPCS

## 2023-08-07 PROCEDURE — 36415 COLL VENOUS BLD VENIPUNCTURE: CPT

## 2023-08-07 PROCEDURE — 87075 CULTR BACTERIA EXCEPT BLOOD: CPT

## 2023-08-07 PROCEDURE — 87070 CULTURE OTHR SPECIMN AEROBIC: CPT

## 2023-08-07 PROCEDURE — 99285 EMERGENCY DEPT VISIT HI MDM: CPT

## 2023-08-07 PROCEDURE — 96375 TX/PRO/DX INJ NEW DRUG ADDON: CPT

## 2023-08-07 PROCEDURE — 85025 COMPLETE CBC W/AUTO DIFF WBC: CPT

## 2023-08-07 RX ORDER — MORPHINE SULFATE 4 MG/ML
4 INJECTION, SOLUTION INTRAMUSCULAR; INTRAVENOUS
Status: DISCONTINUED | OUTPATIENT
Start: 2023-08-07 | End: 2023-08-09 | Stop reason: HOSPADM

## 2023-08-07 RX ORDER — LIDOCAINE HYDROCHLORIDE AND EPINEPHRINE 10; 10 MG/ML; UG/ML
20 INJECTION, SOLUTION INFILTRATION; PERINEURAL ONCE
Status: COMPLETED | OUTPATIENT
Start: 2023-08-07 | End: 2023-08-07

## 2023-08-07 RX ORDER — HYDROCODONE BITARTRATE AND ACETAMINOPHEN 5; 325 MG/1; MG/1
2 TABLET ORAL EVERY 4 HOURS PRN
Status: DISCONTINUED | OUTPATIENT
Start: 2023-08-07 | End: 2023-08-09 | Stop reason: HOSPADM

## 2023-08-07 RX ORDER — CEFAZOLIN SODIUM 1 G/50ML
1000 INJECTION, SOLUTION INTRAVENOUS EVERY 8 HOURS
Status: DISCONTINUED | OUTPATIENT
Start: 2023-08-07 | End: 2023-08-09 | Stop reason: HOSPADM

## 2023-08-07 RX ORDER — MORPHINE SULFATE 2 MG/ML
2 INJECTION, SOLUTION INTRAMUSCULAR; INTRAVENOUS
Status: DISCONTINUED | OUTPATIENT
Start: 2023-08-07 | End: 2023-08-09 | Stop reason: HOSPADM

## 2023-08-07 RX ORDER — KETOROLAC TROMETHAMINE 30 MG/ML
30 INJECTION, SOLUTION INTRAMUSCULAR; INTRAVENOUS ONCE
Status: COMPLETED | OUTPATIENT
Start: 2023-08-07 | End: 2023-08-07

## 2023-08-07 RX ORDER — CYCLOBENZAPRINE HCL 10 MG
10 TABLET ORAL 3 TIMES DAILY PRN
Status: DISCONTINUED | OUTPATIENT
Start: 2023-08-07 | End: 2023-08-09 | Stop reason: HOSPADM

## 2023-08-07 RX ORDER — MORPHINE SULFATE 2 MG/ML
2 INJECTION, SOLUTION INTRAMUSCULAR; INTRAVENOUS ONCE
Status: COMPLETED | OUTPATIENT
Start: 2023-08-07 | End: 2023-08-07

## 2023-08-07 RX ORDER — ONDANSETRON 2 MG/ML
4 INJECTION INTRAMUSCULAR; INTRAVENOUS EVERY 6 HOURS PRN
Status: DISCONTINUED | OUTPATIENT
Start: 2023-08-07 | End: 2023-08-09 | Stop reason: HOSPADM

## 2023-08-07 RX ORDER — HYDROCODONE BITARTRATE AND ACETAMINOPHEN 5; 325 MG/1; MG/1
1 TABLET ORAL EVERY 4 HOURS PRN
Status: DISCONTINUED | OUTPATIENT
Start: 2023-08-07 | End: 2023-08-09 | Stop reason: HOSPADM

## 2023-08-07 RX ADMIN — LIDOCAINE HYDROCHLORIDE AND EPINEPHRINE 20 ML: 10; 10 INJECTION, SOLUTION INFILTRATION; PERINEURAL at 15:37

## 2023-08-07 RX ADMIN — MORPHINE SULFATE 2 MG: 2 INJECTION, SOLUTION INTRAMUSCULAR; INTRAVENOUS at 16:11

## 2023-08-07 RX ADMIN — KETOROLAC TROMETHAMINE 30 MG: 30 INJECTION, SOLUTION INTRAMUSCULAR; INTRAVENOUS at 14:38

## 2023-08-07 RX ADMIN — CEFAZOLIN SODIUM 1000 MG: 1 INJECTION, SOLUTION INTRAVENOUS at 21:56

## 2023-08-07 ASSESSMENT — PAIN - FUNCTIONAL ASSESSMENT
PAIN_FUNCTIONAL_ASSESSMENT: 0-10
PAIN_FUNCTIONAL_ASSESSMENT: NONE - DENIES PAIN
PAIN_FUNCTIONAL_ASSESSMENT: 0-10

## 2023-08-07 ASSESSMENT — PAIN DESCRIPTION - LOCATION: LOCATION: KNEE

## 2023-08-07 ASSESSMENT — PAIN SCALES - GENERAL
PAINLEVEL_OUTOF10: 4
PAINLEVEL_OUTOF10: 2
PAINLEVEL_OUTOF10: 0
PAINLEVEL_OUTOF10: 4
PAINLEVEL_OUTOF10: 4

## 2023-08-07 ASSESSMENT — PAIN DESCRIPTION - ORIENTATION: ORIENTATION: RIGHT

## 2023-08-07 NOTE — ED NOTES
[facility] informed writer IP bed is marked clean, but is dirty. Requested 10 minutes prior to transporting pt.      Augustin Preston, PAULA  27/06/13 9857

## 2023-08-07 NOTE — ED NOTES
Pt updated on POC. Pt medicated per MAR. No needs expressed at this time. Respirations even and unlabored, call light within reach.  [address] [place], [place]  08/07/23 9061

## 2023-08-07 NOTE — ED NOTES
ED to inpatient nurses report    Chief Complaint   Patient presents with    Knee Pain     Right      Present to ED from home  LOC: alert and orientated to name, place, date  Vital signs   Vitals:    08/07/23 1439 08/07/23 1536 08/07/23 1612 08/07/23 1904   BP: 129/74 116/72 131/75 128/67   Pulse: 81 74 68 71   Resp: 16 18 18 16   Temp:       TempSrc:       SpO2: 94% 95% 95% 93%   Weight:       Height:          Oxygen Baseline RA    Current needs required RA Bipap/Cpap No  LDAs:   Peripheral IV 08/07/23 Left Antecubital (Active)   Site Assessment Clean, dry & intact 08/07/23 1904   Line Status Normal saline locked 08/07/23 1904   Phlebitis Assessment No symptoms 08/07/23 1904   Infiltration Assessment 0 08/07/23 1904   Dressing Status Clean, dry & intact 08/07/23 1904   Dressing Type Transparent 08/07/23 1331   Dressing Intervention New 08/07/23 1331     Mobility: Requires assistance * 1  Pending ED orders: complete  Present condition: stable    Electronically signed by Meena Lopez RN on 8/7/2023 at 7:05 PM      Tomasa Coleman  08/07/23 1905

## 2023-08-07 NOTE — ED PROVIDER NOTES
Transfer of Care Note:   Physician Signing out: Destiney Grayson MD  Receiving Physician: Basilio Costello MD  Sign out time: 1610      Brief history:  67-year-old male presents with knee pain on the right side, with chills. Knee aspiration was done and sent for analysis    Items pending that need to be checked:  Aspiration analysis results with cultures and crystals      Tentative Impression of patient:  Septic arthritis  Gout or pseudogout    Expected disposition of patient:  Pending results, admitted. Additional Assessment and results:   I have personally performed a face to face diagnostic evaluation on this patient. The patient's initial evaluation and plan have been discussed with the prior physician who initially evaluated the patient. Nursing Notes, Past Medical Hx, Past Surgical Hx, Social Hx, Allergies, vital signs and Family Hx were all reviewed. Vitals:    08/07/23 1904   BP: 128/67   Pulse: 71   Resp: 16   Temp:    SpO2: 93%     Physical Exam  Constitutional:       Appearance: He is not ill-appearing or toxic-appearing. Cardiovascular:      Rate and Rhythm: Normal rate. Musculoskeletal:      Right knee: Swelling and effusion present. No deformity or erythema. Decreased range of motion. Tenderness present. Left knee: Normal.   Neurological:      Mental Status: He is alert.          Labs Reviewed   SEDIMENTATION RATE - Abnormal; Notable for the following components:       Result Value    Sed Rate 225 (*)     All other components within normal limits   CBC WITH AUTO DIFFERENTIAL - Abnormal; Notable for the following components:    WBC 11.8 (*)     Segs Absolute 8.0 (*)     Monocytes Absolute 1.9 (*)     All other components within normal limits   COMPREHENSIVE METABOLIC PANEL - Abnormal; Notable for the following components:    Glucose 113 (*)     CO2 19 (*)     All other components within normal limits   OSMOLALITY - Abnormal; Notable for the following components:    Osmolality Calc 274.7

## 2023-08-07 NOTE — ED TRIAGE NOTES
Pt presents to the ER with c/o right knee pain and swelling that started yesterday. Pt denies injury. Pt received 8mg Morphine in route.  Pt is alert, vss

## 2023-08-07 NOTE — ED NOTES
Dr Yasmine Vieira and Dr Patricio Roy at bedside performing knee aspiration, pt tolerating well. No needs expressed.  [place], [place]  08/07/23 3679

## 2023-08-07 NOTE — ED NOTES
Pt resting in bed, denies pain at this time. Provider at bedside with update on admission. No needs expressed at this time.  [place], [place]  08/07/23 5030

## 2023-08-07 NOTE — ED NOTES
Pt medicated per MAR. No needs expressed at this time. Respirations even and unlabored, call light within reach.  [address]  08/07/23 6342

## 2023-08-07 NOTE — ED PROVIDER NOTES
[address] [facility]  EMERGENCY MEDICINE ATTENDING ATTESTATION      Evaluation of Veronica Marie. Case discussed and care plan developed with resident physician. I agree with the resident physician documentation and plan as documented by him, except if my documentation differs. Patient seen, interviewed and examined by me. I reviewed the medical, surgical, family and social history, medications and allergies. I have reviewed and interpreted all available lab, radiology and ekg results available at the moment. I have reviewed the nursing documentation. Brief H&P   Patient c/o worsening right knee pain and swelling since yesterday afternoon after kneeling more than usual at work. Patient states he had some chills last night and is unsure if he had fever as well. Patient has had arthrocentesis in the same knee in the past due to knee pain. Physical exam is notable for well appearing, afebrile here. There is significant joint effusion on the right knee, small area of erythema without induration in the medial aspect of the joint line on the right knee. There is significant pain on movement of this knee. Medical Decision Making   MDM:   Right knee pain and swelling  Possible pseudogout versus septic arthritis with significantly elevated Kocher criteria. Plan:   IV line, labs  Arthrocentesis  Analgesia  Observation in the ED while awaiting results    Please see the resident physician completed note for final disposition except as documented on this attestation. I have reviewed and interpreted all available lab, radiology and ekg results available at the moment. Diagnosis, treatment and disposition plans were discussed and agreed upon by patient. This transcription was electronically signed. It was dictated by use of voice recognition software and electronically transcribed. The transcription may contain errors not detected in proofreading.      I performed direct

## 2023-08-07 NOTE — ED NOTES
Spoke to [employer] to approve pt transport to [facility] in stable condition.      Karie Bernal LPN  35/45/06 5366

## 2023-08-08 ENCOUNTER — APPOINTMENT (OUTPATIENT)
Dept: GENERAL RADIOLOGY | Age: 68
End: 2023-08-08
Payer: MEDICARE

## 2023-08-08 LAB
CHARACTER SNV: ABNORMAL
COLOR SNV: ABNORMAL
CRP SERPL-MCNC: 17.48 MG/DL (ref 0–1)
CRYSTALS FLD MICRO: ABNORMAL
GRANULOCYTES NFR FLD AUTO: 92.1 % (ref 0–24)
INR PPP: 0.93 (ref 0.85–1.13)
MONONUC CELLS NFR FLD AUTO: 7.9 % (ref 0–74)
NUC CELL # FLD AUTO: ABNORMAL /CUMM (ref 0–150)
PATHOLOGIST REVIEW: ABNORMAL
TOTAL VOLUME RECEIVED SYNOVIAL: 10 ML
TROPONIN, HIGH SENSITIVITY: 9 NG/L (ref 0–12)
TSH SERPL DL<=0.005 MIU/L-ACNC: 2.96 UIU/ML (ref 0.4–4.2)

## 2023-08-08 PROCEDURE — 93005 ELECTROCARDIOGRAM TRACING: CPT | Performed by: PHYSICIAN ASSISTANT

## 2023-08-08 PROCEDURE — 84443 ASSAY THYROID STIM HORMONE: CPT

## 2023-08-08 PROCEDURE — 71045 X-RAY EXAM CHEST 1 VIEW: CPT

## 2023-08-08 PROCEDURE — 84484 ASSAY OF TROPONIN QUANT: CPT

## 2023-08-08 PROCEDURE — 96376 TX/PRO/DX INJ SAME DRUG ADON: CPT

## 2023-08-08 PROCEDURE — 93010 ELECTROCARDIOGRAM REPORT: CPT | Performed by: INTERNAL MEDICINE

## 2023-08-08 PROCEDURE — 96366 THER/PROPH/DIAG IV INF ADDON: CPT

## 2023-08-08 PROCEDURE — 85610 PROTHROMBIN TIME: CPT

## 2023-08-08 PROCEDURE — G0378 HOSPITAL OBSERVATION PER HR: HCPCS

## 2023-08-08 PROCEDURE — 6370000000 HC RX 637 (ALT 250 FOR IP): Performed by: PHYSICIAN ASSISTANT

## 2023-08-08 PROCEDURE — 99222 1ST HOSP IP/OBS MODERATE 55: CPT | Performed by: NURSE PRACTITIONER

## 2023-08-08 PROCEDURE — 6360000002 HC RX W HCPCS: Performed by: ORTHOPAEDIC SURGERY

## 2023-08-08 PROCEDURE — 86140 C-REACTIVE PROTEIN: CPT

## 2023-08-08 PROCEDURE — 36415 COLL VENOUS BLD VENIPUNCTURE: CPT

## 2023-08-08 RX ORDER — LEVOTHYROXINE SODIUM 0.03 MG/1
25 TABLET ORAL DAILY
Status: DISCONTINUED | OUTPATIENT
Start: 2023-08-08 | End: 2023-08-09 | Stop reason: HOSPADM

## 2023-08-08 RX ORDER — GABAPENTIN 100 MG/1
100 CAPSULE ORAL NIGHTLY
Status: DISCONTINUED | OUTPATIENT
Start: 2023-08-08 | End: 2023-08-09 | Stop reason: HOSPADM

## 2023-08-08 RX ORDER — ATORVASTATIN CALCIUM 80 MG/1
80 TABLET, FILM COATED ORAL NIGHTLY
Status: DISCONTINUED | OUTPATIENT
Start: 2023-08-08 | End: 2023-08-09 | Stop reason: HOSPADM

## 2023-08-08 RX ORDER — AMLODIPINE BESYLATE 10 MG/1
10 TABLET ORAL DAILY
Status: DISCONTINUED | OUTPATIENT
Start: 2023-08-08 | End: 2023-08-09 | Stop reason: HOSPADM

## 2023-08-08 RX ORDER — FENOFIBRATE 160 MG/1
160 TABLET ORAL DAILY
Status: DISCONTINUED | OUTPATIENT
Start: 2023-08-08 | End: 2023-08-09 | Stop reason: HOSPADM

## 2023-08-08 RX ADMIN — CEFAZOLIN SODIUM 1000 MG: 1 INJECTION, SOLUTION INTRAVENOUS at 12:37

## 2023-08-08 RX ADMIN — CEFAZOLIN SODIUM 1000 MG: 1 INJECTION, SOLUTION INTRAVENOUS at 21:40

## 2023-08-08 RX ADMIN — FENOFIBRATE 160 MG: 160 TABLET ORAL at 10:30

## 2023-08-08 RX ADMIN — AMLODIPINE BESYLATE 10 MG: 10 TABLET ORAL at 10:29

## 2023-08-08 RX ADMIN — ATORVASTATIN CALCIUM 80 MG: 80 TABLET, FILM COATED ORAL at 19:48

## 2023-08-08 RX ADMIN — GABAPENTIN 100 MG: 100 CAPSULE ORAL at 19:48

## 2023-08-08 RX ADMIN — CEFAZOLIN SODIUM 1000 MG: 1 INJECTION, SOLUTION INTRAVENOUS at 04:33

## 2023-08-08 RX ADMIN — LEVOTHYROXINE SODIUM 25 MCG: 0.03 TABLET ORAL at 10:29

## 2023-08-08 RX ADMIN — HYDROCODONE BITARTRATE AND ACETAMINOPHEN 1 TABLET: 5; 325 TABLET ORAL at 18:25

## 2023-08-08 ASSESSMENT — PAIN SCALES - GENERAL
PAINLEVEL_OUTOF10: 0
PAINLEVEL_OUTOF10: 4
PAINLEVEL_OUTOF10: 0

## 2023-08-08 NOTE — PLAN OF CARE
Problem: Discharge Planning  Goal: Discharge to home or other facility with appropriate resources  Outcome: Progressing  Flowsheets (Taken 8/7/2023 2209)  Discharge to home or other facility with appropriate resources:   Identify barriers to discharge with patient and caregiver   Arrange for needed discharge resources and transportation as appropriate   Identify discharge learning needs (meds, wound care, etc)     Problem: Pain  Goal: Verbalizes/displays adequate comfort level or baseline comfort level  Outcome: Progressing  Flowsheets (Taken 8/7/2023 2209)  Verbalizes/displays adequate comfort level or baseline comfort level:   Encourage patient to monitor pain and request assistance   Assess pain using appropriate pain scale   Administer analgesics based on type and severity of pain and evaluate response   Implement non-pharmacological measures as appropriate and evaluate response     Problem: Safety - Adult  Goal: Free from fall injury  Outcome: Progressing  Flowsheets (Taken 8/7/2023 2209)  Free From Fall Injury:   Instruct family/caregiver on patient safety   Based on caregiver fall risk screen, instruct family/caregiver to ask for assistance with transferring infant if caregiver noted to have fall risk factors     Care plan reviewed with patient. Patient verbalizes understanding of the plan of care and contributes to goal setting.

## 2023-08-08 NOTE — H&P
Orthopaedic H&P  Patient:  Kevin Vega  YOB: 1955  MRN: [id number]     Acct: [de-identified]    PCP: HEVER Berry CNP  Date of Admission: 8/7/2023  Date of Service: Pt seen/examined on 8/8/2023     Chief Complaint: R knee pain  History Of Present Illness: 70 y.o. male who presents with atraumatic onset of right knee pain for two days. Patient noted some increase in activity yesterday, laying floor, but pain became unbearable to the point of inability to [facility] prompting visit to the ED. History of menisectomy on RLE, no post operative complications, > 20 years ago. Pain at the global anterior knee, worsened with deep palpation, relieved by immobilization, already has found improvement since admission to hospital. No feelings of paresthesia. No radiating symptoms. Denies any catching locking popping feelings of instability. Knee was aspirated by the ED provider, sent for synovial fluid analysis and culture  Baseline active, ambulates without assistive walking device. Afebrile  225 sed rate  11.8 WBC  Crp pending    Past Medical History:        Diagnosis Date    Ankylosing spondylitis ([address])     Current moderate episode of major depressive disorder without prior episode ([address]) 9/19/2019    Migraine     TIA (transient ischemic attack)        Past Surgical History:        Procedure Laterality Date    KNEE SURGERY  1983    both knees     MYRINGOTOMY AND TYMPANOSTOMY TUBE PLACEMENT Left 04/16/2013    Dr Roman Corrales     MYRINGOTOMY AND TYMPANOSTOMY TUBE PLACEMENT Bilateral 01/18/2022    in office with Dr. Skinny Muir Medications:   Prior to Admission medications    Medication Sig Start Date End Date Taking? Authorizing Provider   amLODIPine (NORVASC) 10 MG tablet Take 1 tablet by mouth once daily 5/31/23   HEVER Berry CNP   gabapentin (NEURONTIN) 100 MG capsule Take 1 capsule by mouth nightly for 90 days.  5/31/23 8/29/23  HEVER Berry CNP   fenofibrate (TRIGLIDE) 160 MG tablet Take 1 tablet by mouth daily 6/16/23   HEVER Smith - CNP   atorvastatin (LIPITOR) 80 MG tablet Take 1 tablet by mouth nightly 6/16/23   HEVER Smith - CNP   levothyroxine (EUTHYROX) 25 MCG tablet TAKE 1 TABLET BY MOUTH ONCE DAILY WITH  WATER  ONLY  ON  AN  EMPTY  STOMACH  WAIT  30  MINUTES  BEFORE  EATING  OR  TAKING  OTHER  MEDS 3/14/23   HEVER Smith CNP   ibuprofen (IBU) 800 MG tablet Take 1 tablet by mouth every 8 hours as needed for Pain Take with food. 10/6/22   HEVER Smith CNP   Handicap Placard MISC by Does not apply route Expires 5 year from date of prescription 8/25/22   ShaliniHEVER Todd CNP   omega-3 acid ethyl esters (LOVAZA) 1 g capsule Take 2 capsules by mouth 2 times daily 7/21/21   HEVER Smith CNP   levothyroxine (EUTHYROX) 25 MCG tablet TAKE 1 TABLET BY MOUTH ONCE DAILY WITH  WATER  ONLY  ON  AN  EMPTY  STOMACH  WAIT  30  MINUTES  BEFORE  EATING  OR  TAKING  OTHER  MEDS 7/21/21   HEVER Smith CNP   aspirin 81 MG tablet Take 1 tablet by mouth daily    Historical Provider, MD       Current Hospital Medications:    Current Facility-Administered Medications:     amLODIPine (NORVASC) tablet 10 mg, 10 mg, Oral, Daily, Sariah Nichole. VARUN Jackson    atorvastatin (LIPITOR) tablet 80 mg, 80 mg, Oral, Nightly, Shanelle Jackson PA-C    fenofibrate (TRIGLIDE) tablet 160 mg, 160 mg, Oral, Daily, Sariah Nichole. VARUN Jackson    gabapentin (NEURONTIN) capsule 100 mg, 100 mg, Oral, Nightly, Shanelle Jackson PA-C    levothyroxine (SYNTHROID) tablet 25 mcg, 25 mcg, Oral, Daily, Sariah Nichole.  VARUN Jackson    cyclobenzaprine (FLEXERIL) tablet 10 mg, 10 mg, Oral, TID PRN, Teagan Alcantara PA-C    ondansetron (ZOFRAN) injection 4 mg, 4 mg, IntraVENous, Q6H PRN, Teagan Pointer Klausing, PA-C    HYDROcodone-acetaminophen (NORCO) 5-325 MG per tablet 1 tablet, 1 tablet, Oral, Q4H PRN **OR** HYDROcodone-acetaminophen (NORCO)

## 2023-08-08 NOTE — PLAN OF CARE
No growth of knee aspiration at this time  Clinically has improved since admission  Will advance diet  NPO midnight  Hospitalist consult placed  Continue iv abx    Pedro Donis PA-C

## 2023-08-08 NOTE — PROGRESS NOTES
Pt admitted to  [facility] from ED. Complaints: right knee pain. IV none infusing into the antecubital left, condition patent and no redness  IV site free of s/s of infection or infiltration. Vital signs obtained. Assessment and data collection initiated. Two nurse skin assessment performed by Jennifer Light and Solo Bergman RN. Oriented to room. Policies and procedures for [facility] explained. All questions answered with no further questions at this time. Fall prevention and safety brochure discussed with patient. Bed alarm on. Call light in reach.

## 2023-08-08 NOTE — CARE COORDINATION
Case Management Assessment  Initial Evaluation    Date/Time of Evaluation: 8/8/2023 2:08 PM  Assessment Completed by: Danette Ibrahim RN    If patient is discharged prior to next notation, then this note serves as note for discharge by case management. Patient Name: Za Chen                   YOB: 1955  Diagnosis: Septic arthritis [facility]) [M00.9]  Acute pain of right knee [M25.561]  Pyogenic arthritis of right knee joint, due to unspecified organism [facility]) [M00.9]                   Date / Time: 8/7/2023  1:26 PM  Location: [address] [place] [address]     Patient Admission Status: Observation   Readmission Risk Low 0-14, Mod 15-19), High > 20: No data recorded  Current PCP: HEVER Gaviria CNP  PCP verified by CM? Yes    Chart Reviewed: Yes      History Provided by: Patient  Patient Orientation: Alert and Oriented    Patient Cognition: Alert    Hospitalization in the last 30 days (Readmission):  No    If yes, Readmission Assessment in  Navigator will be completed. Advance Directives:      Code Status: Prior   Patient's Primary Decision Maker is: Legal Next of Kin    Primary Decision Maker: Jennifer Galvez  Child - [phone]    Discharge Planning:    Patient lives with: Alone Type of Home: House  Primary Care Giver: Self  Patient Support Systems include: Children   Current Financial resources: Medicare  Current community resources: None  Current services prior to admission: None            Current DME:              Type of Home Care services:  None    ADLS  Prior functional level: Independent in ADLs/IADLs  Current functional level: Independent in ADLs/IADLs    Family can provide assistance at DC: Yes  Would you like Case Management to discuss the discharge plan with any other family members/significant others, and if so, who?  Yes  Plans to Return to Present Housing: Yes  Other Identified Issues/Barriers to RETURNING to current housing: no  Potential Assistance needed at discharge: N/A Potential DME:    Patient expects to discharge to: [address] [facility] for transportation at discharge: Family    Financial    Payor: Dangjoe Mao / Plan: MEDICARE PART A AND B / Product Type: *No Product type* /     Does insurance require precert for SNF: No    Potential assistance Purchasing Medications: No  Meds-to-Beds request: Yes      [address]  [address]  Phone: [phone] Fax: [phone]      Notes:    Factors facilitating achievement of predicted outcomes: Family support, Motivated, Cooperative, and Pleasant    Barriers to discharge: Medical complications    Additional Case Management Notes: Admit from ER as observation with R knee pain. Consult to hospitalist. BP up to 170/75 this am. CRP 17.48. Knee aspirated in ER, sent to culture. IV Ancef every 8 hours. 08/07/23 17:00   Polymorphonuclear Cells Synovial Fluid 92.1 (H)   Total Nucleated Cells Synovial 59261 (H) (C)     Procedure: 8/7 XR R Knee: No fracture or dislocation. Tricompartmental osteoarthritis. The Plan for Transition of Care is related to the following treatment goals of Septic arthritis ([address]) [M00.9]  Acute pain of right knee [M25.561]  Pyogenic arthritis of right knee joint, due to unspecified organism [facility]) [M00.9]    Patient Goals/Plan/Treatment Preferences: From home alone. Denies any needs at this time. Transportation/Food Security/Housekeeping Addressed: No issues identified.      Mónica Chapin RN  Case Management Department

## 2023-08-08 NOTE — PLAN OF CARE
Problem: Discharge Planning  Goal: Discharge to home or other facility with appropriate resources  Outcome: Progressing  Flowsheets (Taken 8/7/2023 2209 by Tunde Krishnamurthy RN)  Discharge to home or other facility with appropriate resources:   Identify barriers to discharge with patient and caregiver   Arrange for needed discharge resources and transportation as appropriate   Identify discharge learning needs (meds, wound care, etc)     Problem: Pain  Goal: Verbalizes/displays adequate comfort level or baseline comfort level  Outcome: Progressing  Flowsheets (Taken 8/7/2023 2209 by Tunde Krishnamurthy RN)  Verbalizes/displays adequate comfort level or baseline comfort level:   Encourage patient to monitor pain and request assistance   Assess pain using appropriate pain scale   Administer analgesics based on type and severity of pain and evaluate response   Implement non-pharmacological measures as appropriate and evaluate response     Problem: Safety - Adult  Goal: Free from fall injury  Outcome: Progressing  Flowsheets (Taken 8/7/2023 2209 by Tunde Krishnamurthy RN)  Free From Fall Injury:   Instruct family/caregiver on patient safety   Based on caregiver fall risk screen, instruct family/caregiver to ask for assistance with transferring infant if caregiver noted to have fall risk factors   Care plan reviewed with patient. Patient verbalizes understanding of the plan of care and contribute to goal setting.

## 2023-08-09 VITALS
SYSTOLIC BLOOD PRESSURE: 144 MMHG | OXYGEN SATURATION: 94 % | DIASTOLIC BLOOD PRESSURE: 66 MMHG | WEIGHT: 173.06 LBS | RESPIRATION RATE: 18 BRPM | TEMPERATURE: 97.5 F | BODY MASS INDEX: 24.78 KG/M2 | HEART RATE: 69 BPM | HEIGHT: 70 IN

## 2023-08-09 LAB
BASOPHILS ABSOLUTE: 0 THOU/MM3 (ref 0–0.1)
BASOPHILS NFR BLD AUTO: 0.4 %
DEPRECATED RDW RBC AUTO: 42.1 FL (ref 35–45)
EOSINOPHIL NFR BLD AUTO: 4.4 %
EOSINOPHILS ABSOLUTE: 0.3 THOU/MM3 (ref 0–0.4)
ERYTHROCYTE [DISTWIDTH] IN BLOOD BY AUTOMATED COUNT: 13 % (ref 11.5–14.5)
HCT VFR BLD AUTO: 43 % (ref 42–52)
HGB BLD-MCNC: 13.9 GM/DL (ref 14–18)
IMM GRANULOCYTES # BLD AUTO: 0.04 THOU/MM3 (ref 0–0.07)
IMM GRANULOCYTES NFR BLD AUTO: 0.6 %
LYMPHOCYTES ABSOLUTE: 1.8 THOU/MM3 (ref 1–4.8)
LYMPHOCYTES NFR BLD AUTO: 25.4 %
MCH RBC QN AUTO: 28.6 PG (ref 26–33)
MCHC RBC AUTO-ENTMCNC: 32.3 GM/DL (ref 32.2–35.5)
MCV RBC AUTO: 88.5 FL (ref 80–94)
MONOCYTES ABSOLUTE: 0.9 THOU/MM3 (ref 0.4–1.3)
MONOCYTES NFR BLD AUTO: 13 %
NEUTROPHILS NFR BLD AUTO: 56.2 %
NRBC BLD AUTO-RTO: 0 /100 WBC
PLATELET # BLD AUTO: 230 THOU/MM3 (ref 130–400)
PMV BLD AUTO: 10.8 FL (ref 9.4–12.4)
RBC # BLD AUTO: 4.86 MILL/MM3 (ref 4.7–6.1)
SEGMENTED NEUTROPHILS ABSOLUTE COUNT: 4 THOU/MM3 (ref 1.8–7.7)
WBC # BLD AUTO: 7.1 THOU/MM3 (ref 4.8–10.8)

## 2023-08-09 PROCEDURE — 96366 THER/PROPH/DIAG IV INF ADDON: CPT

## 2023-08-09 PROCEDURE — 6370000000 HC RX 637 (ALT 250 FOR IP): Performed by: PHYSICIAN ASSISTANT

## 2023-08-09 PROCEDURE — 6360000002 HC RX W HCPCS: Performed by: ORTHOPAEDIC SURGERY

## 2023-08-09 PROCEDURE — G0378 HOSPITAL OBSERVATION PER HR: HCPCS

## 2023-08-09 PROCEDURE — 85025 COMPLETE CBC W/AUTO DIFF WBC: CPT

## 2023-08-09 PROCEDURE — 36415 COLL VENOUS BLD VENIPUNCTURE: CPT

## 2023-08-09 RX ORDER — OMEPRAZOLE 20 MG/1
20 CAPSULE, DELAYED RELEASE ORAL
Qty: 40 CAPSULE | Refills: 0 | Status: SHIPPED | OUTPATIENT
Start: 2023-08-09 | End: 2023-08-17 | Stop reason: ALTCHOICE

## 2023-08-09 RX ORDER — CYCLOBENZAPRINE HCL 10 MG
10 TABLET ORAL 3 TIMES DAILY PRN
Qty: 30 TABLET | Refills: 0 | Status: SHIPPED | OUTPATIENT
Start: 2023-08-09 | End: 2023-08-17 | Stop reason: ALTCHOICE

## 2023-08-09 RX ORDER — NAPROXEN 500 MG/1
500 TABLET ORAL 2 TIMES DAILY WITH MEALS
Qty: 40 TABLET | Refills: 0 | Status: SHIPPED | OUTPATIENT
Start: 2023-08-09 | End: 2023-08-17 | Stop reason: ALTCHOICE

## 2023-08-09 RX ADMIN — CEFAZOLIN SODIUM 1000 MG: 1 INJECTION, SOLUTION INTRAVENOUS at 04:37

## 2023-08-09 RX ADMIN — AMLODIPINE BESYLATE 10 MG: 10 TABLET ORAL at 08:53

## 2023-08-09 RX ADMIN — FENOFIBRATE 160 MG: 160 TABLET ORAL at 08:54

## 2023-08-09 ASSESSMENT — PAIN SCALES - GENERAL
PAINLEVEL_OUTOF10: 0

## 2023-08-09 NOTE — PLAN OF CARE
Problem: Discharge Planning  Goal: Discharge to home or other facility with appropriate resources  Outcome: Completed     Problem: Pain  Goal: Verbalizes/displays adequate comfort level or baseline comfort level  Outcome: Completed     Problem: Safety - Adult  Goal: Free from fall injury  Outcome: Completed   Care plan reviewed with patient. Patient verbalize understanding of the plan of care and contribute to goal setting.

## 2023-08-09 NOTE — DISCHARGE SUMMARY
Physician Discharge Summary     Patient ID:  Veronica Marie  [id number]  58 y.o.  1955    Admit date: 8/7/2023    Discharge date and time: 8/9/23     Admitting Physician: Gretel Reyes MD     Discharge Physician: Gretel Reyes MD     Admission Diagnoses: Septic arthritis [facility]) [M00.9]  Acute pain of right knee [M25.561]  Pyogenic arthritis of right knee joint, due to unspecified organism [facility]) [M00.9]    Discharge Diagnoses: Acute pain of right knee [M25.561]    Admission Condition: good    Discharged Condition: good    Indication for Admission: unstable orthopaedic injury     Hospital Course: Patient presented to the ED with 2 day history of knee effusion and pain, atraumatic. ED performed aspiration where synovial fluid analysis and culture were obtained, admitted for observation to ensure no growth from aspiration out of concern for septic joint. Patient stated on iv abx. He responded well to abx and pain was well controlled, no growth during duration of hospital stay, hospitalist consulted in light of elevated inflammatory markers and non concerning joint aspiration, no interventions noted, stable for discharge.       Discharge Exam:  Please see final progress note for appropriate discharge exam    Disposition: home    In process/preliminary results:  Outstanding Order Results       Date and Time Order Name Status Description    8/7/2023  2:00 PM Culture, Body Fluid Preliminary             Patient Instructions:   Current Discharge Medication List        START taking these medications    Details   cyclobenzaprine (FLEXERIL) 10 MG tablet Take 1 tablet by mouth 3 times daily as needed for Muscle spasms  Qty: 30 tablet, Refills: 0      omeprazole (PRILOSEC) 20 MG delayed release capsule Take 1 capsule by mouth 2 times daily (before meals) for 20 days  Qty: 40 capsule, Refills: 0      naproxen (NAPROSYN) 500 MG tablet Take 1 tablet by mouth 2 times daily (with meals) for 20 days  Qty: 40 tablet, Refills: 0 CONTINUE these medications which have NOT CHANGED    Details   amLODIPine (NORVASC) 10 MG tablet Take 1 tablet by mouth once daily  Qty: 90 tablet, Refills: 2    Associated Diagnoses: Essential hypertension      gabapentin (NEURONTIN) 100 MG capsule Take 1 capsule by mouth nightly for 90 days.   Qty: 90 capsule, Refills: 1    Associated Diagnoses: Chronic left shoulder pain; Peripheral polyneuropathy      fenofibrate (TRIGLIDE) 160 MG tablet Take 1 tablet by mouth daily  Qty: 90 tablet, Refills: 3      atorvastatin (LIPITOR) 80 MG tablet Take 1 tablet by mouth nightly  Qty: 90 tablet, Refills: 3    Associated Diagnoses: Hypercholesterolemia with hypertriglyceridemia      levothyroxine (EUTHYROX) 25 MCG tablet TAKE 1 TABLET BY MOUTH ONCE DAILY WITH  WATER  ONLY  ON  AN  EMPTY  STOMACH  WAIT  30  MINUTES  BEFORE  EATING  OR  TAKING  OTHER  MEDS  Qty: 90 tablet, Refills: 2    Associated Diagnoses: Hypothyroidism, unspecified type      Handicap Placard MISC by Does not apply route Expires 5 year from date of prescription  Qty: 1 each, Refills: 0    Associated Diagnoses: Chronic left shoulder pain; Peripheral polyneuropathy      omega-3 acid ethyl esters (LOVAZA) 1 g capsule Take 2 capsules by mouth 2 times daily  Qty: 60 capsule, Refills: 5      aspirin 81 MG tablet Take 1 tablet by mouth daily           STOP taking these medications       ibuprofen (IBU) 800 MG tablet Comments:   Reason for Stopping:             Activity: activity as tolerated  Diet: regular diet    Follow-up with Dr Eh Quintanilla in 1 -2 weeks    Signed:  Denver Kappa, PA-C    8/9/2023  7:12 AM

## 2023-08-09 NOTE — PROGRESS NOTES
Discussed discharge instructions with patient. Patient voiced understanding and has no further questions. Patient has prescriptions for Naproxen, Flexeril and Omeprazole from [facility]. Patient transported via wheel chair to private car.   Gabriella Leger LPN

## 2023-08-09 NOTE — CARE COORDINATION
8/9/23, 10:05 AM EDT    Patient goals/plan/ treatment preferences discussed by  and . Patient goals/plan/ treatment preferences reviewed with patient/ family. Patient/ family verbalize understanding of discharge plan and are in agreement with goal/plan/treatment preferences. Understanding was demonstrated using the teach back method. AVS provided by RN at time of discharge, which includes all necessary medical information pertaining to the patients current course of illness, treatment, post-discharge goals of care, and treatment preferences. Services At/After Discharge: None       IMM Letter  Observation Status Letter date given[de-identified] 08/07/23  Observation Status Letter time given[de-identified] 1134  Observation Status Letter given to Patient/Family/Significant other/Guardian/POA/by[de-identified] patient registration     Plan to discharge today, returning home alone. Follow up 1 week w Dr Magda Ellis.

## 2023-08-10 ENCOUNTER — CARE COORDINATION (OUTPATIENT)
Dept: CASE MANAGEMENT | Age: 68
End: 2023-08-10

## 2023-08-10 DIAGNOSIS — I10 ESSENTIAL HYPERTENSION: Primary | ICD-10-CM

## 2023-08-10 NOTE — CARE COORDINATION
Care Transitions Outreach Attempt-Knee pain, Septic arthritis    Call within 2 business days of discharge: Yes   Attempted to reach patient for transitions of care follow up. Unable to reach patient. Patient: Dale Goodwin Patient : 1955 MRN: [id number]    Last Discharge [address]       Date Complaint Diagnosis Description Type Department Provider    23 Knee Pain Acute pain of right knee . .. ED to Hosp-Admission (Discharged) (ADMITTED) DANIELLE 7K Yusuf Scott MD; Dominguez Orellana,. .. Was this an external facility discharge? No Discharge Facility: [facility]    Noted following upcoming appointments from discharge chart review:   [facility] follow up appointment(s):   Future Appointments   Date Time Provider [address]   2023 12:00 PM HEVER Gutierrez - CNP [facility]     Non-[facility] follow up appointment(s): [facility] appt on 23    1st attempt to contact pt for initial care transition follow up. Unable to reach pt. Left message with contact information and request for call back.     Judith Mei RN  Care Transition Nurse

## 2023-08-10 NOTE — CARE COORDINATION
[address] [facility] 250 Care Transitions Initial Follow Up Call    Call within 2 business days of discharge: Yes    Patient Current Location: [address] [place] [address] Transition Nurse contacted the patient by telephone to perform post hospital discharge assessment. Verified name and  with patient as identifiers. Provided introduction to self, and explanation of the Care Transition Nurse role. Patient: Aj Lopez Patient : 1955   MRN: [id number]  Reason for Admission: Acute knee pain  Discharge Date: 23 RARS: No data recorded    Last Discharge [address] [facility],[address]       Date Complaint Diagnosis Description Type Department Provider    23 Knee Pain Acute pain of right knee . .. ED to Hosp-Admission (Discharged) (ADMITTED) Iban Oconnell MD; Jorje Pinto,... Was this an external facility discharge? No Discharge Facility: [address] [place] [address] to be reviewed by the provider   Additional needs identified to be addressed with provider: Yes  Pt prescribed Naproxen and Omeprazole for 20 days but pt does not feel the need to take medications. Method of communication with provider: chart routing. Received incoming call from pt for initial care transition follow up. Shital Thompson states he is doing good. Reports swelling in right knee has decreased. States he can see definition in knee. Mobility slowing returning. Reports calf tightness. Denies having any pain, warmth, discoloration, redness to area. He states he has been favoring his leg and tightness is now \"letting up\". He is up and moving around. Not having to use any assistive device when ambulating. Denies having any pain/discomfort. Denies having any fever or chills, shortness of breath, chest pain/discomfort. He has noticed voice is a bit raspy. Denies having a sore throat. Eating and drinking WNL. Urinary and bowels WNL. Reviewed medication list.  Was prescribed Flexeril, Naproxen and Omeprazole.   Pt wondering if he has to take

## 2023-08-11 LAB
EKG ATRIAL RATE: 69 BPM
EKG P AXIS: 72 DEGREES
EKG P-R INTERVAL: 228 MS
EKG Q-T INTERVAL: 382 MS
EKG QRS DURATION: 104 MS
EKG QTC CALCULATION (BAZETT): 409 MS
EKG R AXIS: -7 DEGREES
EKG T AXIS: 80 DEGREES
EKG VENTRICULAR RATE: 69 BPM

## 2023-08-12 LAB
BACTERIA SPEC ANAEROBE CULT: NORMAL
BACTERIA SPEC BFLD CULT: NORMAL
GRAM STN SPEC: NORMAL

## 2023-08-17 ENCOUNTER — CARE COORDINATION (OUTPATIENT)
Dept: CASE MANAGEMENT | Age: 68
End: 2023-08-17

## 2023-08-17 ENCOUNTER — OFFICE VISIT (OUTPATIENT)
Dept: FAMILY MEDICINE CLINIC | Age: 68
End: 2023-08-17

## 2023-08-17 VITALS
RESPIRATION RATE: 18 BRPM | TEMPERATURE: 97.9 F | DIASTOLIC BLOOD PRESSURE: 68 MMHG | SYSTOLIC BLOOD PRESSURE: 112 MMHG | HEART RATE: 76 BPM | BODY MASS INDEX: 24.88 KG/M2 | OXYGEN SATURATION: 97 % | WEIGHT: 173.4 LBS

## 2023-08-17 DIAGNOSIS — Z09 HOSPITAL DISCHARGE FOLLOW-UP: Primary | ICD-10-CM

## 2023-08-17 DIAGNOSIS — M15.9 PRIMARY OSTEOARTHRITIS INVOLVING MULTIPLE JOINTS: ICD-10-CM

## 2023-08-17 DIAGNOSIS — M25.561 ACUTE PAIN OF RIGHT KNEE: ICD-10-CM

## 2023-08-17 ASSESSMENT — PATIENT HEALTH QUESTIONNAIRE - PHQ9
SUM OF ALL RESPONSES TO PHQ QUESTIONS 1-9: 0
SUM OF ALL RESPONSES TO PHQ9 QUESTIONS 1 & 2: 0
SUM OF ALL RESPONSES TO PHQ QUESTIONS 1-9: 0
SUM OF ALL RESPONSES TO PHQ QUESTIONS 1-9: 0
2. FEELING DOWN, DEPRESSED OR HOPELESS: 0
1. LITTLE INTEREST OR PLEASURE IN DOING THINGS: 0
SUM OF ALL RESPONSES TO PHQ QUESTIONS 1-9: 0

## 2023-08-17 ASSESSMENT — ENCOUNTER SYMPTOMS
DIARRHEA: 0
SHORTNESS OF BREATH: 0
VOMITING: 0
CONSTIPATION: 0
CHEST TIGHTNESS: 0
EYE DISCHARGE: 0
RHINORRHEA: 0
ABDOMINAL PAIN: 0
COUGH: 0

## 2023-08-17 NOTE — CARE COORDINATION
you currently have any active services?: No  Do you have any needs or concerns that I can assist you with?: No  Care Transitions Interventions  Other Interventions:             Care Transition Nurse provided contact information for future needs. Plan for follow-up call in 7-10 days based on severity of symptoms and risk factors.   Plan for next call: symptom management-any new or worsening symptoms    Doug Lucas RN

## 2023-08-17 NOTE — PROGRESS NOTES
Post-Discharge Transitional Care Follow Up      Guera Cohen   YOB: 1955    Date of Office Visit:  8/17/2023  Date of Hospital Admission: 8/7/23  Date of Hospital Discharge: 8/9/23  Readmission Risk Score (high >=14%. Medium >=10%):No data recorded    Care management risk score Rising risk (score 2-5) and Complex Care (Scores >=6): No Risk Score On File     Non face to face  following discharge, date last encounter closed (first attempt may have been earlier): 08/10/2023     Call initiated 2 business days of discharge: Yes     Hospital discharge follow-up  -     ND DISCHARGE MEDS RECONCILED W/ CURRENT OUTPATIENT MED LIST  Acute pain of right knee  Primary osteoarthritis involving multiple joints  Labs and xray reviewed  If further problems is established with OIO   Can do stretching for arthritis  Can take OTC glucosamine chondroitin, tumeric and cinnamon   Declines PT referral    Medical Decision Making: straightforward  Return in about 6 months (around 2/17/2024) for AWV . Subjective:   Here for follow up right knee infection  States the only thing he could think of is he was on his knees working outside, states that he did have a spider bite that same day prior to his symptoms   Had fever and was shaking   Was admitted ,Had antibiotics  Was told that is was possible gout at first  Has no pain now- states that he can run jump and climb   Has a follow up with Dr. Magda Ellis yesterday       Inpatient course: Discharge summary reviewed- see chart.     Interval history/Current status: stable     Patient Active Problem List   Diagnosis    TIA involving carotid artery    Peripheral neuropathy    Vitamin B12 deficiency neuropathy (HCC)    Hypercholesterolemia with hypertriglyceridemia    Ankylosing spondylitis ([address])    Dental caries    Essential hypertension    Hypothyroidism    Bilateral hearing loss    Dry eye    Intercostal muscle strain    Intercostal pain     Eccentric opening of tricuspid

## 2023-08-24 ENCOUNTER — CARE COORDINATION (OUTPATIENT)
Dept: CASE MANAGEMENT | Age: 68
End: 2023-08-24

## 2023-08-24 NOTE — CARE COORDINATION
[facility] Care Transitions Follow Up Call    Patient Current Location: [address] [place] [address] Transition Nurse contacted the patient by telephone to follow up after admission on 23. Verified name and  with patient as identifiers. Patient: Avis Rojo  Patient : 1955   MRN: [id number]  Reason for Admission: Acute knee pain  Discharge Date: 23 RARS: No data recorded    Needs to be reviewed by the provider   Additional needs identified to be addressed with provider: No  none             Method of communication with provider: none. Contacted pt for care transition follow up. Spoke very briefly with pt who was not at home. 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  Mr. Areli Edwards states he is doing just fine. Right knee is no longer bothering pt. Denies pain or swelling. Reports spider bite is doing \"just fine\". He does not have any questions or concerns at this time. Addressed changes since last contact:  none    Follow Up  Future Appointments   Date Time Provider [address]   2024  1:00 PM HEVER Martinez - CNP [facility]       Patients top risk factors for readmission: medical condition-Septic arthritis     Care Transitions Subsequent and Final Call    Subsequent and Final Calls  Do you have any ongoing symptoms?: No  Have your medications changed?: No  Do you have any questions related to your medications?: No  Do you currently have any active services?: No  Do you have any needs or concerns that I can assist you with?: No  Care Transitions Interventions  Other Interventions:             Care Transition Nurse provided contact information for future needs. Plan for follow-up call in 10-14 days based on severity of symptoms and risk factors.   Plan for next call: symptom management-any new or worsening symptoms    Andie Morrow RN

## 2023-09-01 ENCOUNTER — CARE COORDINATION (OUTPATIENT)
Dept: CASE MANAGEMENT | Age: 68
End: 2023-09-01

## 2023-09-06 ENCOUNTER — CARE COORDINATION (OUTPATIENT)
Dept: CASE MANAGEMENT | Age: 68
End: 2023-09-06

## 2023-09-06 NOTE — CARE COORDINATION
Care Transitions Outreach Attempt-Acute knee pain    Attempted to reach patient for transitions of care follow up. Unable to reach patient. Patient: Trav Gaitan Patient : 1955 MRN: [id number]    Last Discharge [address]       Date Complaint Diagnosis Description Type Department Provider    23 Knee Pain Acute pain of right knee . .. ED to Hosp-Admission (Discharged) (ADMITTED) Leeann Villegas MD; Charline Benjamin,... Noted following upcoming appointments from discharge chart review:   [address] [address] [facility] [address] follow up appointment(s):   Future Appointments   Date Time Provider [address]   2024  1:00 PM Gerhard Seip, APRN - CNP [facility]     Attempted to contact pt for care transition follow up. Unable to reach pt. Left message with contact information and request for call back.       Natali Thorne RN  Care Transition Nurse

## 2023-09-08 ENCOUNTER — CARE COORDINATION (OUTPATIENT)
Dept: CASE MANAGEMENT | Age: 68
End: 2023-09-08

## 2023-09-08 NOTE — CARE COORDINATION
Care Transitions Outreach Attempt       Second and final attempt to reach patient for transitions of care follow up. Unable to reach patient. Caller left a HIPAA compliant message with contact information. No further outreach. Program resolved. Patient: Shonna Forrest Patient : 1955 MRN: [id number]    Last Discharge [address] [facility],[address]       Date Complaint Diagnosis Description Type Department Provider    23 Knee Pain Acute pain of right knee . .. ED to Hosp-Admission (Discharged) (ADMITTED) DANIELLE 7K Noam Reyes MD; Bismark Simental,. .. Was this an external facility discharge?  No Discharge Facility: [facility]    Noted following upcoming appointments from discharge chart review:   [facility] follow up appointment(s):   Future Appointments   Date Time Provider [address]   2024  1:00 PM HEVER Escamilla - CNP [facility]

## 2023-10-02 ENCOUNTER — TELEPHONE (OUTPATIENT)
Dept: FAMILY MEDICINE CLINIC | Age: 68
End: 2023-10-02

## 2023-10-02 ENCOUNTER — APPOINTMENT (OUTPATIENT)
Dept: MRI IMAGING | Age: 68
End: 2023-10-02
Payer: MEDICARE

## 2023-10-02 ENCOUNTER — APPOINTMENT (OUTPATIENT)
Dept: CT IMAGING | Age: 68
End: 2023-10-02
Payer: MEDICARE

## 2023-10-02 ENCOUNTER — HOSPITAL ENCOUNTER (OUTPATIENT)
Age: 68
Setting detail: OBSERVATION
Discharge: HOME | End: 2023-10-03
Attending: EMERGENCY MEDICINE
Payer: MEDICARE

## 2023-10-02 DIAGNOSIS — R55 PRE-SYNCOPE: Primary | ICD-10-CM

## 2023-10-02 DIAGNOSIS — I65.21 CAROTID STENOSIS, ASYMPTOMATIC, RIGHT: ICD-10-CM

## 2023-10-02 LAB
ANION GAP SERPL CALC-SCNC: 12 MEQ/L (ref 8–16)
BASOPHILS ABSOLUTE: 0 THOU/MM3 (ref 0–0.1)
BASOPHILS NFR BLD AUTO: 0.5 %
BUN SERPL-MCNC: 17 MG/DL (ref 7–22)
CALCIUM SERPL-MCNC: 9.3 MG/DL (ref 8.5–10.5)
CHLORIDE SERPL-SCNC: 106 MEQ/L (ref 98–111)
CO2 SERPL-SCNC: 20 MEQ/L (ref 23–33)
CREAT SERPL-MCNC: 1.2 MG/DL (ref 0.4–1.2)
DEPRECATED RDW RBC AUTO: 42.2 FL (ref 35–45)
EOSINOPHIL NFR BLD AUTO: 3.6 %
EOSINOPHILS ABSOLUTE: 0.3 THOU/MM3 (ref 0–0.4)
ERYTHROCYTE [DISTWIDTH] IN BLOOD BY AUTOMATED COUNT: 13.4 % (ref 11.5–14.5)
GFR SERPL CREATININE-BSD FRML MDRD: > 60 ML/MIN/1.73M2
GLUCOSE BLD STRIP.AUTO-MCNC: 154 MG/DL (ref 70–108)
GLUCOSE SERPL-MCNC: 91 MG/DL (ref 70–108)
HCT VFR BLD AUTO: 46.9 % (ref 42–52)
HGB BLD-MCNC: 15.4 GM/DL (ref 14–18)
IMM GRANULOCYTES # BLD AUTO: 0.02 THOU/MM3 (ref 0–0.07)
IMM GRANULOCYTES NFR BLD AUTO: 0.3 %
LYMPHOCYTES ABSOLUTE: 1.8 THOU/MM3 (ref 1–4.8)
LYMPHOCYTES NFR BLD AUTO: 25.3 %
MCH RBC QN AUTO: 28.4 PG (ref 26–33)
MCHC RBC AUTO-ENTMCNC: 32.8 GM/DL (ref 32.2–35.5)
MCV RBC AUTO: 86.4 FL (ref 80–94)
MONOCYTES ABSOLUTE: 0.8 THOU/MM3 (ref 0.4–1.3)
MONOCYTES NFR BLD AUTO: 10.8 %
NEUTROPHILS NFR BLD AUTO: 59.5 %
NRBC BLD AUTO-RTO: 0 /100 WBC
NT-PROBNP SERPL IA-MCNC: 79.5 PG/ML (ref 0–124)
OSMOLALITY SERPL CALC.SUM OF ELEC: 276.8 MOSMOL/KG (ref 275–300)
PLATELET # BLD AUTO: 254 THOU/MM3 (ref 130–400)
PMV BLD AUTO: 10.8 FL (ref 9.4–12.4)
POTASSIUM SERPL-SCNC: 3.9 MEQ/L (ref 3.5–5.2)
RBC # BLD AUTO: 5.43 MILL/MM3 (ref 4.7–6.1)
SEGMENTED NEUTROPHILS ABSOLUTE COUNT: 4.3 THOU/MM3 (ref 1.8–7.7)
SODIUM SERPL-SCNC: 138 MEQ/L (ref 135–145)
TROPONIN, HIGH SENSITIVITY: 9 NG/L (ref 0–12)
WBC # BLD AUTO: 7.3 THOU/MM3 (ref 4.8–10.8)

## 2023-10-02 PROCEDURE — 70498 CT ANGIOGRAPHY NECK: CPT

## 2023-10-02 PROCEDURE — 84484 ASSAY OF TROPONIN QUANT: CPT

## 2023-10-02 PROCEDURE — 82948 REAGENT STRIP/BLOOD GLUCOSE: CPT

## 2023-10-02 PROCEDURE — 80048 BASIC METABOLIC PNL TOTAL CA: CPT

## 2023-10-02 PROCEDURE — 93005 ELECTROCARDIOGRAM TRACING: CPT | Performed by: EMERGENCY MEDICINE

## 2023-10-02 PROCEDURE — G0378 HOSPITAL OBSERVATION PER HR: HCPCS

## 2023-10-02 PROCEDURE — 70551 MRI BRAIN STEM W/O DYE: CPT

## 2023-10-02 PROCEDURE — 70496 CT ANGIOGRAPHY HEAD: CPT

## 2023-10-02 PROCEDURE — 6360000004 HC RX CONTRAST MEDICATION: Performed by: STUDENT IN AN ORGANIZED HEALTH CARE EDUCATION/TRAINING PROGRAM

## 2023-10-02 PROCEDURE — 6360000002 HC RX W HCPCS: Performed by: PHYSICIAN ASSISTANT

## 2023-10-02 PROCEDURE — 70450 CT HEAD/BRAIN W/O DYE: CPT

## 2023-10-02 PROCEDURE — 96372 THER/PROPH/DIAG INJ SC/IM: CPT

## 2023-10-02 PROCEDURE — 6370000000 HC RX 637 (ALT 250 FOR IP): Performed by: PHYSICIAN ASSISTANT

## 2023-10-02 PROCEDURE — 99223 1ST HOSP IP/OBS HIGH 75: CPT | Performed by: PHYSICIAN ASSISTANT

## 2023-10-02 PROCEDURE — 99285 EMERGENCY DEPT VISIT HI MDM: CPT

## 2023-10-02 PROCEDURE — 36415 COLL VENOUS BLD VENIPUNCTURE: CPT

## 2023-10-02 PROCEDURE — 83880 ASSAY OF NATRIURETIC PEPTIDE: CPT

## 2023-10-02 PROCEDURE — 93010 ELECTROCARDIOGRAM REPORT: CPT | Performed by: INTERNAL MEDICINE

## 2023-10-02 PROCEDURE — 85025 COMPLETE CBC W/AUTO DIFF WBC: CPT

## 2023-10-02 PROCEDURE — 99223 1ST HOSP IP/OBS HIGH 75: CPT | Performed by: NURSE PRACTITIONER

## 2023-10-02 RX ORDER — ACETAMINOPHEN 650 MG/1
650 SUPPOSITORY RECTAL EVERY 6 HOURS PRN
Status: DISCONTINUED | OUTPATIENT
Start: 2023-10-02 | End: 2023-10-03 | Stop reason: HOSPADM

## 2023-10-02 RX ORDER — ACETAMINOPHEN 325 MG/1
650 TABLET ORAL EVERY 6 HOURS PRN
Status: DISCONTINUED | OUTPATIENT
Start: 2023-10-02 | End: 2023-10-03 | Stop reason: HOSPADM

## 2023-10-02 RX ORDER — ONDANSETRON 2 MG/ML
4 INJECTION INTRAMUSCULAR; INTRAVENOUS EVERY 6 HOURS PRN
Status: DISCONTINUED | OUTPATIENT
Start: 2023-10-02 | End: 2023-10-03 | Stop reason: HOSPADM

## 2023-10-02 RX ORDER — AMLODIPINE BESYLATE 10 MG/1
10 TABLET ORAL DAILY
Status: DISCONTINUED | OUTPATIENT
Start: 2023-10-03 | End: 2023-10-03 | Stop reason: HOSPADM

## 2023-10-02 RX ORDER — LEVOTHYROXINE SODIUM 0.03 MG/1
25 TABLET ORAL DAILY
Status: DISCONTINUED | OUTPATIENT
Start: 2023-10-03 | End: 2023-10-03 | Stop reason: HOSPADM

## 2023-10-02 RX ORDER — SODIUM CHLORIDE 0.9 % (FLUSH) 0.9 %
5-40 SYRINGE (ML) INJECTION EVERY 12 HOURS SCHEDULED
Status: DISCONTINUED | OUTPATIENT
Start: 2023-10-02 | End: 2023-10-03 | Stop reason: HOSPADM

## 2023-10-02 RX ORDER — ENOXAPARIN SODIUM 100 MG/ML
40 INJECTION SUBCUTANEOUS EVERY 24 HOURS
Status: DISCONTINUED | OUTPATIENT
Start: 2023-10-02 | End: 2023-10-03 | Stop reason: HOSPADM

## 2023-10-02 RX ORDER — ATORVASTATIN CALCIUM 80 MG/1
80 TABLET, FILM COATED ORAL NIGHTLY
Status: DISCONTINUED | OUTPATIENT
Start: 2023-10-03 | End: 2023-10-03 | Stop reason: HOSPADM

## 2023-10-02 RX ORDER — ONDANSETRON 4 MG/1
4 TABLET, ORALLY DISINTEGRATING ORAL EVERY 8 HOURS PRN
Status: DISCONTINUED | OUTPATIENT
Start: 2023-10-02 | End: 2023-10-03 | Stop reason: HOSPADM

## 2023-10-02 RX ORDER — SODIUM CHLORIDE 0.9 % (FLUSH) 0.9 %
5-40 SYRINGE (ML) INJECTION PRN
Status: DISCONTINUED | OUTPATIENT
Start: 2023-10-02 | End: 2023-10-03 | Stop reason: HOSPADM

## 2023-10-02 RX ORDER — SODIUM CHLORIDE 9 MG/ML
INJECTION, SOLUTION INTRAVENOUS PRN
Status: DISCONTINUED | OUTPATIENT
Start: 2023-10-02 | End: 2023-10-03 | Stop reason: HOSPADM

## 2023-10-02 RX ORDER — POLYETHYLENE GLYCOL 3350 17 G/17G
17 POWDER, FOR SOLUTION ORAL DAILY PRN
Status: DISCONTINUED | OUTPATIENT
Start: 2023-10-02 | End: 2023-10-03 | Stop reason: HOSPADM

## 2023-10-02 RX ORDER — ASPIRIN 81 MG/1
81 TABLET, CHEWABLE ORAL DAILY
Status: DISCONTINUED | OUTPATIENT
Start: 2023-10-03 | End: 2023-10-03 | Stop reason: HOSPADM

## 2023-10-02 RX ORDER — GABAPENTIN 100 MG/1
100 CAPSULE ORAL NIGHTLY
Status: DISCONTINUED | OUTPATIENT
Start: 2023-10-02 | End: 2023-10-03 | Stop reason: HOSPADM

## 2023-10-02 RX ORDER — FENOFIBRATE 160 MG/1
160 TABLET ORAL DAILY
Status: DISCONTINUED | OUTPATIENT
Start: 2023-10-03 | End: 2023-10-03 | Stop reason: HOSPADM

## 2023-10-02 RX ADMIN — ACETAMINOPHEN 650 MG: 325 TABLET ORAL at 19:41

## 2023-10-02 RX ADMIN — GABAPENTIN 100 MG: 100 CAPSULE ORAL at 19:41

## 2023-10-02 RX ADMIN — ENOXAPARIN SODIUM 40 MG: 100 INJECTION SUBCUTANEOUS at 18:48

## 2023-10-02 RX ADMIN — IOPAMIDOL 80 ML: 755 INJECTION, SOLUTION INTRAVENOUS at 12:04

## 2023-10-02 ASSESSMENT — PAIN DESCRIPTION - PAIN TYPE: TYPE: ACUTE PAIN

## 2023-10-02 ASSESSMENT — ENCOUNTER SYMPTOMS
SHORTNESS OF BREATH: 0
ABDOMINAL PAIN: 0
NAUSEA: 0
SORE THROAT: 0
COUGH: 0
RHINORRHEA: 0
VOMITING: 0

## 2023-10-02 ASSESSMENT — PAIN - FUNCTIONAL ASSESSMENT
PAIN_FUNCTIONAL_ASSESSMENT: 0-10
PAIN_FUNCTIONAL_ASSESSMENT: NONE - DENIES PAIN

## 2023-10-02 ASSESSMENT — PAIN SCALES - GENERAL
PAINLEVEL_OUTOF10: 0

## 2023-10-02 NOTE — ED PROVIDER NOTES
[address] [facility] EMERGENCY DEPT    Pt Name: Roland Zayas  MRN: [id number]  [address] [facility] 1955  Date of evaluation: 10/2/2023  Resident Physician: Beverly Alejandra MD  Attending Physician: Thomas Gonzalez DO      CHIEF COMPLAINT       Chief Complaint   Patient presents with    Loss of Consciousness    Fatigue     HISTORY OF PRESENT ILLNESS   Roland Zayas is a 70 y.o. male with PMHx of TIA and vitamin B12 deficient neuropathy  who presents to the emergency department for evaluation of dizziness. Patient reports that he was working in the yard and [employer] yesterday morning for several hours. States that when he turned his head to the right he felt like he was going to fall. This occurred around 11:30 AM.  States he had to sit in the stool for 4 hours because if you try to stand up he was felt like he was going to fall. During this time he denies any presyncope, nausea, emesis, weakness, slurred speech, facial droop or headache. Patient reports that he kept his eyes closed throughout this. But does not have definitive vision loss. Patient states compliant with medications. He denies any chest pain or palpitations. States that he felt better around 6:00 and ate. States that his symptoms are improving. States that he is able to ambulate today without feeling dizzy and drove to the ED. States that when he had a TIA in the past he had hearing loss and difficulties with speech. States that this episode was unlike that. The patient has no other acute complaints at this time.   PASTMEDICAL HISTORY     Past Medical History:   Diagnosis Date    Ankylosing spondylitis ([address])     Current moderate episode of major depressive disorder without prior episode ([address]) 9/19/2019    Migraine     TIA (transient ischemic attack)        Patient Active Problem List   Diagnosis Code    TIA involving carotid artery G45.1    Peripheral neuropathy G62.9    Vitamin B12 deficiency neuropathy ([address]) E53.8, G63    Hypercholesterolemia with

## 2023-10-02 NOTE — ED NOTES
Pt resting in bed. Respirations even and unlabored. Updated pt on plan of care. Bed lowest position, call light in reach, side rails x2.      Mahsa Wilkins RN  10/02/23 8117 csxn, repeat

## 2023-10-02 NOTE — CONSULTS
and/or weight based dosing when appropriate to reduce the radiation dose to as low as reasonably achievable. FINDINGS: Mild atrophy with prominence of the ventricular system is stable. No intraparenchymal hemorrhage, mass, mass effect, or shift of the midline structures is observed. No extra-axial fluid collections are identified. Periventricular and subcortical white matter low attenuation suggesting chronic microvascular ischemic disease is unchanged. The bony calvarium appears intact. The paranasal sinuses are unremarkable. The left mastoid air cells remain opacified. Partial opacification of the right mastoid air cells is observed. Partial opacification of the right mastoid air cells and stable opacification of the left mastoid air cells can be clinically correlated for mastoiditis. No acute intracranial process is observed. **This report has been created using voice recognition software. It may contain minor errors which are inherent in voice recognition technology. ** Final report electronically signed by Dr Derrell Cook on 10/2/2023 12:13 PM        Assessment and Plan:        Near syncope event  CT head negative for any acute intracranial abnormalities  CT angiogram head and neck negative for any hemodynamically significant stenosis, does demonstrate a 9 mm possible ulcerated plaque involving the right carotid bifurcation  MRI brain without contrast negative for any acute intracranial abnormalities  Continue Aspirin 81mg, Atorvastatin 80mg daily  Recommend orthostatic blood pressure measurements and cardiac workup  Patient to follow up in interventional neurology clinic in 6 months with Dr. Sophia Toribio regarding right carotid plaque  No further work-up or recommendations per neurology, we will sign off at this time. Please call with any questions or if we can be of additional assistance. Thank you for this consult.             This patient was seen and evaluated with Dr. Sophia Toribio and he is in agreement with

## 2023-10-02 NOTE — ED NOTES
Pt resting in bed. Respirations even and unlabored. Neurology Sven Mcintosh, NP just evaluated pt. Bed lowest position, call light in reach, side rails x2.      Mahsa Sprague, JESUS  10/02/23 7750

## 2023-10-02 NOTE — TELEPHONE ENCOUNTER
Pt was transferred to office via [facility] ([facility]) nurse triage. Pt stated he believes he had a stroke last night and is having a headache this morning. I advised pt to go to the ED to have further testing done that we are not able to complete in our office to be evaluated for stroke. Pt stated he will get ready and go to ED to be further evaluated for possible stroke.

## 2023-10-02 NOTE — ED TRIAGE NOTES
Patient presents to ER with complaints of syncopal episode and fatigue that occurred yesterday around 1100. EKG obtained. Telemetry applied.

## 2023-10-02 NOTE — ED NOTES
Pt transported to 8A03 by cart in stable condition. Called 8A and informed Bhumika that the patient was on their way to the unit.       Nikki Joens LPN  13/15/06 8497

## 2023-10-02 NOTE — H&P
edema. Vasculature: capillary refill < 3 seconds. Lower extremity pulses 2+ bilaterally  Skin:  Warm and dry. No rashes or lesions. Psych: Mood normal.  Affect appropriate. Neurologic: Alert and oriented x4. No cranial nerve deficits. No extremity weakness. Data: (All radiographs, tracings, PFTs, and imaging are personally viewed and interpreted unless otherwise noted)  Labs:   Recent Labs     10/02/23  1045   WBC 7.3   HGB 15.4   HCT 46.9        Recent Labs     10/02/23  1045      K 3.9      CO2 20*   BUN 17   CREATININE 1.2   CALCIUM 9.3     No results for input(s): \"AST\", \"ALT\", \"BILIDIR\", \"BILITOT\", \"ALKPHOS\" in the last 72 hours. No results for input(s): \"INR\" in the last 72 hours. No results for input(s): \"CKTOTAL\", \"TROPONINI\" in the last 72 hours. Urinalysis:   Lab Results   Component Value Date/Time    BLOODU Trace-Intact 03/01/2021 04:03 PM    SPECGRAV 1.025 03/01/2021 04:03 PM    GLUCOSEU Negative 03/01/2021 04:03 PM       EKG: Normal sinus rhythm with first-degree heart block    Radiology:  MRI BRAIN WO CONTRAST    Result Date: 10/2/2023  PROCEDURE: MRI BRAIN WO CONTRAST CLINICAL INFORMATION prior TIA; dizziness/ataxia started at 11:00Am yesterday; worse with R sided movement. COMPARISON: Head CT 10/2/2023. TECHNIQUE: Multiplanar and multiple spin echo MRI images were obtained of the brain without contrast. FINDINGS: The diffusion-weighted images are normal.  The brain volume is normal. There is a mild amount of signal hyperintensity on the FLAIR and T2-weighted sequences in the white matter of the brain. This is consistent with mild severity chronic small vessel ischemic changes. The cerebellum is within acceptable limits. There are no intra-or extra-axial collections. There is no hydrocephalus, midline shift or mass effect. There is no susceptibility artifact in the brain. The major intracranial vascular flow voids are present.  The midline craniocervical junction

## 2023-10-03 VITALS
SYSTOLIC BLOOD PRESSURE: 128 MMHG | DIASTOLIC BLOOD PRESSURE: 72 MMHG | TEMPERATURE: 97.5 F | BODY MASS INDEX: 24.34 KG/M2 | OXYGEN SATURATION: 96 % | WEIGHT: 170 LBS | HEIGHT: 70 IN | RESPIRATION RATE: 18 BRPM | HEART RATE: 66 BPM

## 2023-10-03 LAB
ANION GAP SERPL CALC-SCNC: 13 MEQ/L (ref 8–16)
BASOPHILS ABSOLUTE: 0 THOU/MM3 (ref 0–0.1)
BASOPHILS NFR BLD AUTO: 0.8 %
BUN SERPL-MCNC: 18 MG/DL (ref 7–22)
CALCIUM SERPL-MCNC: 8.8 MG/DL (ref 8.5–10.5)
CHLORIDE SERPL-SCNC: 109 MEQ/L (ref 98–111)
CO2 SERPL-SCNC: 21 MEQ/L (ref 23–33)
CREAT SERPL-MCNC: 1.3 MG/DL (ref 0.4–1.2)
DEPRECATED RDW RBC AUTO: 42.2 FL (ref 35–45)
EOSINOPHIL NFR BLD AUTO: 5.5 %
EOSINOPHILS ABSOLUTE: 0.3 THOU/MM3 (ref 0–0.4)
ERYTHROCYTE [DISTWIDTH] IN BLOOD BY AUTOMATED COUNT: 13.6 % (ref 11.5–14.5)
FOLATE SERPL-MCNC: 9 NG/ML (ref 4.8–24.2)
GFR SERPL CREATININE-BSD FRML MDRD: 59 ML/MIN/1.73M2
GLUCOSE SERPL-MCNC: 93 MG/DL (ref 70–108)
HCT VFR BLD AUTO: 45.5 % (ref 42–52)
HGB BLD-MCNC: 15.2 GM/DL (ref 14–18)
IMM GRANULOCYTES # BLD AUTO: 0.02 THOU/MM3 (ref 0–0.07)
IMM GRANULOCYTES NFR BLD AUTO: 0.3 %
LYMPHOCYTES ABSOLUTE: 1.8 THOU/MM3 (ref 1–4.8)
LYMPHOCYTES NFR BLD AUTO: 29.3 %
MCH RBC QN AUTO: 28.8 PG (ref 26–33)
MCHC RBC AUTO-ENTMCNC: 33.4 GM/DL (ref 32.2–35.5)
MCV RBC AUTO: 86.3 FL (ref 80–94)
MONOCYTES ABSOLUTE: 0.8 THOU/MM3 (ref 0.4–1.3)
MONOCYTES NFR BLD AUTO: 12.7 %
NEUTROPHILS NFR BLD AUTO: 51.4 %
NRBC BLD AUTO-RTO: 0 /100 WBC
OSMOLALITY SERPL CALC.SUM OF ELEC: 286.6 MOSMOL/KG (ref 275–300)
PLATELET # BLD AUTO: 245 THOU/MM3 (ref 130–400)
PMV BLD AUTO: 11.1 FL (ref 9.4–12.4)
POTASSIUM SERPL-SCNC: 4.2 MEQ/L (ref 3.5–5.2)
RBC # BLD AUTO: 5.27 MILL/MM3 (ref 4.7–6.1)
SEGMENTED NEUTROPHILS ABSOLUTE COUNT: 3.1 THOU/MM3 (ref 1.8–7.7)
SODIUM SERPL-SCNC: 143 MEQ/L (ref 135–145)
VIT B12 SERPL-MCNC: 259 PG/ML (ref 211–911)
WBC # BLD AUTO: 6 THOU/MM3 (ref 4.8–10.8)

## 2023-10-03 PROCEDURE — 93306 TTE W/DOPPLER COMPLETE: CPT

## 2023-10-03 PROCEDURE — 82746 ASSAY OF FOLIC ACID SERUM: CPT

## 2023-10-03 PROCEDURE — 85025 COMPLETE CBC W/AUTO DIFF WBC: CPT

## 2023-10-03 PROCEDURE — 80048 BASIC METABOLIC PNL TOTAL CA: CPT

## 2023-10-03 PROCEDURE — 97161 PT EVAL LOW COMPLEX 20 MIN: CPT

## 2023-10-03 PROCEDURE — G0378 HOSPITAL OBSERVATION PER HR: HCPCS

## 2023-10-03 PROCEDURE — 36415 COLL VENOUS BLD VENIPUNCTURE: CPT

## 2023-10-03 PROCEDURE — 6370000000 HC RX 637 (ALT 250 FOR IP): Performed by: PHYSICIAN ASSISTANT

## 2023-10-03 PROCEDURE — 82607 VITAMIN B-12: CPT

## 2023-10-03 PROCEDURE — 99239 HOSP IP/OBS DSCHRG MGMT >30: CPT | Performed by: PHYSICIAN ASSISTANT

## 2023-10-03 PROCEDURE — 2580000003 HC RX 258: Performed by: PHYSICIAN ASSISTANT

## 2023-10-03 RX ADMIN — FENOFIBRATE 160 MG: 160 TABLET ORAL at 09:17

## 2023-10-03 RX ADMIN — ASPIRIN 81 MG: 81 TABLET, CHEWABLE ORAL at 09:17

## 2023-10-03 RX ADMIN — LEVOTHYROXINE SODIUM 25 MCG: 0.03 TABLET ORAL at 04:54

## 2023-10-03 RX ADMIN — AMLODIPINE BESYLATE 10 MG: 10 TABLET ORAL at 09:17

## 2023-10-03 RX ADMIN — SODIUM CHLORIDE, PRESERVATIVE FREE 10 ML: 5 INJECTION INTRAVENOUS at 09:18

## 2023-10-03 ASSESSMENT — PAIN SCALES - GENERAL: PAINLEVEL_OUTOF10: 0

## 2023-10-03 NOTE — DISCHARGE SUMMARY
Hospitalist Discharge Note      Patient:  Michael Coombs    Unit/Bed:8A-03/003-A  YOB: 1955  MRN: [id number]   Acct: [de-identified]     PCP: HEVER Colon CNP  Date of Admission: 10/2/2023      Discharge date: 10/3/2023 10:28 AM    Chief Complaint on presentation :-  Presyncope     Discharge Assessment and Plan:-   Pre-syncope  Etiology unclear. PT vestibular consult  - PT note reads \"Received PT vestibular order, pt up in chair, ready to DC home. Pt denies room spinning sensation, denies dizziness currently, states he feels much better, agreeable to hallway ambulation. \"   Check orthostatic vital signs, negative. Telemetry. Echo completed prior to DC. Ulcerated plaque of right internal carotid artery  Neuro consulted, these are their recommendations   CT head negative for any acute intracranial abnormalities  CT angiogram head and neck negative for any hemodynamically significant stenosis, does demonstrate a 9 mm possible ulcerated plaque involving the right carotid bifurcation  MRI brain without contrast negative for any acute intracranial abnormalities  Continue Aspirin 81mg, Atorvastatin 80mg daily  Recommend orthostatic blood pressure measurements and cardiac workup  Patient to follow up in interventional neurology clinic in 6 months with Dr. Edmund Kingsley regarding right carotid plaque  No further work-up or recommendations per neurology, we will sign off at this time. Hypertension  Continue home regimen  Hypothyroidism  Continue Synthroid  Hyperlipidemia  Continue home regimen  Neuropathy  On gabapentin. Etiology unknown. We will place outpatient referral to neurology given neuropathy and recurrent presyncopal episodes. Initial H and P and Hospital course:-  Initial H&P \"Zeke Garduno is a 70 y.o. male with a history of lower extremity neuropathy, hypertension, hypothyroidism, hyperlipidemia, and neuropathy who presented to [facility] with chief complaint of near syncope.

## 2023-10-03 NOTE — PROGRESS NOTES
1030-Patient discharged home with all belongings. Discharge instructions gone over with patient. No questions at this time.

## 2023-10-03 NOTE — PROGRESS NOTES
[facility]  INPATIENT PHYSICAL THERAPY  EVALUATION  [facility] MED SURG 8A - 8A-03/003-A    Time In: 8862  Time Out: 1021  Timed Code Treatment Minutes: 0 Minutes  Minutes: 9          Date: 10/3/2023  Patient Name: Mauro Garcia,  Gender:  male        MRN: [id number]  : 1955  (70 y.o.)      Referring Practitioner: Jillian Hein PA-C  Diagnosis: Pre-syncope  Additional Pertinent Hx: Mauro Garcia is a 70 y.o. male with a history of lower extremity neuropathy, hypertension, hypothyroidism, hyperlipidemia, and neuropathy who presented to [facility] with chief complaint of near syncope. The patient states yesterday he was out working in the yard all day. He then subsequently went to the garage. While in the garage, he quickly turned to the right and then felt as though he was going to pass out. He felt lightheaded and sat down. His symptoms resolved after about 10 minutes and he subsequently went in the house and fell asleep. During that episode, he denied any associated hearing loss, chest pain, palpitations, room spinning sensation, unilateral weakness, numbness, diplopia, or blurred vision. When he woke up this morning, he is still felt mildly lightheaded. As such, he decided to seek treatment the emergency department. Restrictions/Precautions:  Restrictions/Precautions: General Precautions    Subjective:  Chart Reviewed: Yes  Patient assessed for rehabilitation services?: Yes  Family / Caregiver Present: No  Subjective: Received PT vestibular order, pt up in chair, ready to DC home. Pt denies room spinning sensation, denies dizziness currently, states he feels much better, agreeable to hallway ambulation.     General:  Overall Orientation Status: Within Functional Limits  Vision: Within Functional Limits  Hearing: Within functional limits       Pain: denies    Vitals: Vitals not assessed per clinical judgement, see nursing flowsheet    Social/Functional History:    Lives With: Alone  Type

## 2023-10-04 ENCOUNTER — TELEPHONE (OUTPATIENT)
Dept: FAMILY MEDICINE CLINIC | Age: 68
End: 2023-10-04

## 2023-10-04 ENCOUNTER — CARE COORDINATION (OUTPATIENT)
Dept: CASE MANAGEMENT | Age: 68
End: 2023-10-04

## 2023-10-04 DIAGNOSIS — R55 PRE-SYNCOPE: Primary | ICD-10-CM

## 2023-10-04 LAB
EKG ATRIAL RATE: 55 BPM
EKG P AXIS: 54 DEGREES
EKG P-R INTERVAL: 244 MS
EKG Q-T INTERVAL: 428 MS
EKG QRS DURATION: 106 MS
EKG QTC CALCULATION (BAZETT): 409 MS
EKG R AXIS: -20 DEGREES
EKG T AXIS: 51 DEGREES
EKG VENTRICULAR RATE: 55 BPM

## 2023-10-04 NOTE — TELEPHONE ENCOUNTER
Patient was admitted to [facility] on 10/02/2023 for Syncope. Patient was discharged on 10/03/2023. Care Transitions Initial Follow Up Call    Outreach made within 2 business days of discharge: Yes    Patient: Aj Lopez Patient : 1955   MRN: [id number]  Reason for Admission: Pre-Syncope, High blood pressure 180/73. Patient states feeling better today, lots of energy. 136/70 BP Today. Discharge Date: 10/3/23       Spoke with: Patient    Discharge department/facility: [facility] Interactive Patient Contact:  Was patient able to fill all prescriptions: Yes  Was patient instructed to bring all medications to the follow-up visit: Yes  Is patient taking all medications as directed in the discharge summary?  Yes  Does patient understand their discharge instructions: Yes  Does patient have questions or concerns that need addressed prior to 7-14 day follow up office visit: no    Scheduled appointment with PCP within 7-14 days    Follow Up  Future Appointments   Date Time Provider [address]   10/9/2023 11:40 AM HEVER Vicente CNP [facility] ADA DPP   2024  1:00 PM HEVER Vicente CNP [facility] ADA DPP   2024 10:00 AM STR CT IMAGING RM1  OP [facility] OUT EXP STR Rad/Card   2024  1:20 PM SCHEDULE, [address] GUNNAR - Amberly Trevino LPN

## 2023-10-04 NOTE — CARE COORDINATION
2/19/2024  1:00 PM HEVER Zaman - CNP [facility] ADA MHDPP   4/4/2024 10:00 AM STR CT IMAGING RM1  OP [facility] STRZ OUT EXP STR Rad/Card   4/9/2024  1:20 PM SCHEDULE, SRPX NEUROINTERVENTION SRPX NEURINT [facility] Transition Nurse provided contact information. Plan for follow-up call in 5-7 days based on severity of symptoms and risk factors. Plan for next call: symptom management-new or worsening symptoms, dizziness, BP  follow-up appointment-review f/u 10/9  RPM?    Ronald Miner, JESUS       Care Transitions Initial Outreach Attempt-1st attempt    Call within 2 business days of discharge: Yes   Attempted initial 24 hour transitional call to patient. Left HIPPA compliant  to return call directly to [phone].

## 2023-10-09 ENCOUNTER — OFFICE VISIT (OUTPATIENT)
Dept: FAMILY MEDICINE CLINIC | Age: 68
End: 2023-10-09

## 2023-10-09 VITALS
RESPIRATION RATE: 16 BRPM | OXYGEN SATURATION: 97 % | BODY MASS INDEX: 25.08 KG/M2 | HEART RATE: 76 BPM | DIASTOLIC BLOOD PRESSURE: 72 MMHG | WEIGHT: 174.8 LBS | SYSTOLIC BLOOD PRESSURE: 124 MMHG | TEMPERATURE: 98.6 F

## 2023-10-09 DIAGNOSIS — E53.8 VITAMIN B12 DEFICIENCY NEUROPATHY (HCC): ICD-10-CM

## 2023-10-09 DIAGNOSIS — E03.9 HYPOTHYROIDISM, UNSPECIFIED TYPE: ICD-10-CM

## 2023-10-09 DIAGNOSIS — R42 DIZZINESS: ICD-10-CM

## 2023-10-09 DIAGNOSIS — M45.6 ANKYLOSING SPONDYLITIS OF LUMBAR REGION (HCC): ICD-10-CM

## 2023-10-09 DIAGNOSIS — Z91.148 NON COMPLIANCE W MEDICATION REGIMEN: ICD-10-CM

## 2023-10-09 DIAGNOSIS — Z12.5 SCREENING FOR PROSTATE CANCER: ICD-10-CM

## 2023-10-09 DIAGNOSIS — Z09 HOSPITAL DISCHARGE FOLLOW-UP: Primary | ICD-10-CM

## 2023-10-09 DIAGNOSIS — E78.2 HYPERCHOLESTEROLEMIA WITH HYPERTRIGLYCERIDEMIA: ICD-10-CM

## 2023-10-09 DIAGNOSIS — G63 VITAMIN B12 DEFICIENCY NEUROPATHY (HCC): ICD-10-CM

## 2023-10-09 DIAGNOSIS — I10 ESSENTIAL HYPERTENSION: ICD-10-CM

## 2023-10-09 RX ORDER — PREDNISOLONE ACETATE 10 MG/ML
SUSPENSION/ DROPS OPHTHALMIC
COMMUNITY
Start: 2023-08-24

## 2023-10-09 RX ORDER — MOXIFLOXACIN 5 MG/ML
SOLUTION/ DROPS OPHTHALMIC
COMMUNITY
Start: 2023-08-24

## 2023-10-09 ASSESSMENT — ENCOUNTER SYMPTOMS
CHEST TIGHTNESS: 0
CONSTIPATION: 0
ABDOMINAL PAIN: 0
SHORTNESS OF BREATH: 0
COUGH: 0
DIARRHEA: 0
RHINORRHEA: 0
EYE DISCHARGE: 0
VOMITING: 0

## 2023-10-09 NOTE — PROGRESS NOTES
Post-Discharge Transitional Care Follow Up      Roland Zayas   YOB: 1955    Date of Office Visit:  10/9/2023  Date of Hospital Admission: 10/2/23  Date of Hospital Discharge: 10/3/23  Readmission Risk Score (high >=14%. Medium >=10%):No data recorded    Care management risk score Rising risk (score 2-5) and Complex Care (Scores >=6): No Risk Score On File     Non face to face  following discharge, date last encounter closed (first attempt may have been earlier): 10/04/2023     Call initiated 2 business days of discharge: Yes     Hospital discharge follow-up  -     MT DISCHARGE MEDS RECONCILED W/ CURRENT OUTPATIENT MED LIST  Dizziness  Non compliance w medication regimen  Essential hypertension  -     CBC with Auto Differential; Future  -     Comprehensive Metabolic Panel, Fasting; Future  -     Lipid Panel; Future  -     Magnesium; Future  -     TSH with Reflex; Future  Hypothyroidism, unspecified type  -     TSH with Reflex; Future  Vitamin B12 deficiency neuropathy (HCC)  -     Vitamin B12 & Folate; Future  Ankylosing spondylitis of lumbar region [facility])  Hypercholesterolemia with hypertriglyceridemia  -     Lipid Panel; Future  Screening for prostate cancer  -     PSA Screening; Future  Dizziness resolved- labs and testing reviewed from hospital visit   Advised to use pill container as her is not taking his medication routinely  Has planned follow up with getting CTA of neck    Medical Decision Making: moderate complexity  Return for keep fu apt- get labs 1 week prior .            Subjective:   Here for follow up hospital visit  Was working on yard work and then felt overworked and dizzy   Went to rest and then got up to eat and decide to just go to bed  Woke up the next day feeling unwell again  Went to local EMT station and BP checked and it was elevated  Decided to go to ER at that time  Had cardiac work up completed   States that he was not drinking enough water that day as well   Also states \"

## 2023-10-10 ENCOUNTER — CARE COORDINATION (OUTPATIENT)
Dept: CASE MANAGEMENT | Age: 68
End: 2023-10-10

## 2023-10-10 NOTE — CARE COORDINATION
Care Transitions Outreach Attempt-1st attempt    Call within 2 business days of discharge: Yes   Attempted to reach patient for subsequent transitional call. Left HIPPA compliant VM to return call directly to [phone]. Patient: Ann Pope Patient : 1955 MRN: [id number]    Last Discharge Facility       Date Complaint Diagnosis Description Type Department Provider    10/2/23 Loss of Consciousness; Fatigue Pre-syncope . .. ED to Hosp-Admission (Discharged) (ADMITTED) STRZ [address], [facility], [address] [place] [address] Road; Retidanis Likens, ...               Noted following upcoming appointments from discharge chart review:   [address] follow up appointment(s):   Future Appointments   Date Time Provider [address]   2024  1:00 PM HEVER Christopher - CNP C ADA MHDPP   2024 10:00 AM STR CT IMAGING RM1  [facility] STRZ OUT EXP STR Rad/Card   2024  1:20 PM SCHEDULE, SRPX NEUROINTERVENTION SRPX NEURINT P - Tse     Non-BS  follow up appointment(s): na

## 2023-10-12 ENCOUNTER — CARE COORDINATION (OUTPATIENT)
Dept: CASE MANAGEMENT | Age: 68
End: 2023-10-12

## 2023-10-12 NOTE — CARE COORDINATION
Care Transitions Outreach Attempt-2nd attempt    Call within 2 business days of discharge: Yes   Attempted to reach patient for subsequent transitional call. Left HIPPA compliant VM to return call directly to [phone]. If no return call, CTN will sign off-2nd attempt. Unable to reach letter sent via [address] [place] [address]. Patient: Julianne Beltran Patient : 1955 MRN: [id number]    Last Discharge Facility       Date Complaint Diagnosis Description Type Department Provider    10/2/23 Loss of Consciousness; Fatigue Pre-syncope . .. ED to Hosp-Admission (Discharged) (ADMITTED) [facility] [address], [place], [place]; Yanyc Mayfield, ...               Noted following upcoming appointments from discharge chart review:   [facility] follow up appointment(s):   Future Appointments   Date Time Provider [address]   2024  1:00 PM HEVER Lopez - CNP C ADA MHDPP   2024 10:00 AM STR CT IMAGING RM1  [facility] STRZ OUT EXP STR Rad/Card   2024  1:20 PM SCHEDULE, SRPX NEUROINTERVENTION SRPX NEURINT [facility] - Tse     Non-BS  follow up appointment(s): na

## 2023-10-23 ENCOUNTER — HOSPITAL ENCOUNTER (OUTPATIENT)
Dept: AUDIOLOGY | Age: 68
Discharge: HOME | End: 2023-10-23

## 2023-10-23 PROCEDURE — 9990000010 HC NO CHARGE VISIT: Performed by: AUDIOLOGIST

## 2023-10-23 NOTE — PROGRESS NOTES
ACCOUNT #: [de-identified]    DIAGNOSIS: H90.3    HEARING AID PROBLEM (Ruddy Wiley L70R hearing aids fit 2023): Patient is unsure how to change his cerushield filters and people are telling him that he sounds garbled on the phone when streaming via bluetooth. Hearing aids were cleaned. Debris brushed and vacuumed from the mics. Occluded cerushield filters removed from the receivers and receivers were vacuumed. New filters, domes and retention wires installed. Listening check revealed not issues. Updated hearing aid firware. Patient was instructed how to change the cerushield filters. Checked phone streaming- patient's voice was clear. Annual hearing aid checks are recommended.

## 2024-01-02 DIAGNOSIS — M25.512 CHRONIC LEFT SHOULDER PAIN: ICD-10-CM

## 2024-01-02 DIAGNOSIS — G89.29 CHRONIC LEFT SHOULDER PAIN: ICD-10-CM

## 2024-01-02 DIAGNOSIS — G62.9 PERIPHERAL POLYNEUROPATHY: ICD-10-CM

## 2024-01-02 RX ORDER — GABAPENTIN 100 MG/1
100 CAPSULE ORAL NIGHTLY
Qty: 90 CAPSULE | Refills: 1 | Status: SHIPPED | OUTPATIENT
Start: 2024-01-02 | End: 2024-06-30

## 2024-01-09 ENCOUNTER — OFFICE VISIT (OUTPATIENT)
Dept: FAMILY MEDICINE CLINIC | Age: 69
End: 2024-01-09
Payer: MEDICARE

## 2024-01-09 VITALS
SYSTOLIC BLOOD PRESSURE: 132 MMHG | RESPIRATION RATE: 16 BRPM | BODY MASS INDEX: 25.71 KG/M2 | HEART RATE: 58 BPM | TEMPERATURE: 97.2 F | DIASTOLIC BLOOD PRESSURE: 82 MMHG | WEIGHT: 179.2 LBS | OXYGEN SATURATION: 96 %

## 2024-01-09 DIAGNOSIS — M53.3 SACROILIAC JOINT PAIN: Primary | ICD-10-CM

## 2024-01-09 PROCEDURE — 3075F SYST BP GE 130 - 139MM HG: CPT | Performed by: STUDENT IN AN ORGANIZED HEALTH CARE EDUCATION/TRAINING PROGRAM

## 2024-01-09 PROCEDURE — 1123F ACP DISCUSS/DSCN MKR DOCD: CPT | Performed by: STUDENT IN AN ORGANIZED HEALTH CARE EDUCATION/TRAINING PROGRAM

## 2024-01-09 PROCEDURE — 3079F DIAST BP 80-89 MM HG: CPT | Performed by: STUDENT IN AN ORGANIZED HEALTH CARE EDUCATION/TRAINING PROGRAM

## 2024-01-09 PROCEDURE — 3017F COLORECTAL CA SCREEN DOC REV: CPT | Performed by: STUDENT IN AN ORGANIZED HEALTH CARE EDUCATION/TRAINING PROGRAM

## 2024-01-09 PROCEDURE — G8484 FLU IMMUNIZE NO ADMIN: HCPCS | Performed by: STUDENT IN AN ORGANIZED HEALTH CARE EDUCATION/TRAINING PROGRAM

## 2024-01-09 PROCEDURE — 1036F TOBACCO NON-USER: CPT | Performed by: STUDENT IN AN ORGANIZED HEALTH CARE EDUCATION/TRAINING PROGRAM

## 2024-01-09 PROCEDURE — G8427 DOCREV CUR MEDS BY ELIG CLIN: HCPCS | Performed by: STUDENT IN AN ORGANIZED HEALTH CARE EDUCATION/TRAINING PROGRAM

## 2024-01-09 PROCEDURE — G8417 CALC BMI ABV UP PARAM F/U: HCPCS | Performed by: STUDENT IN AN ORGANIZED HEALTH CARE EDUCATION/TRAINING PROGRAM

## 2024-01-09 PROCEDURE — 99213 OFFICE O/P EST LOW 20 MIN: CPT | Performed by: STUDENT IN AN ORGANIZED HEALTH CARE EDUCATION/TRAINING PROGRAM

## 2024-01-09 RX ORDER — CELECOXIB 100 MG/1
100 CAPSULE ORAL 2 TIMES DAILY
Qty: 28 CAPSULE | Refills: 0 | Status: SHIPPED | OUTPATIENT
Start: 2024-01-09 | End: 2024-01-23

## 2024-01-09 ASSESSMENT — ENCOUNTER SYMPTOMS
EYE PAIN: 0
VOMITING: 0
COUGH: 0
SHORTNESS OF BREATH: 0
CONSTIPATION: 0
BACK PAIN: 1
DIARRHEA: 0
EYE REDNESS: 0
ABDOMINAL PAIN: 0
NAUSEA: 0

## 2024-01-09 ASSESSMENT — PATIENT HEALTH QUESTIONNAIRE - PHQ9
SUM OF ALL RESPONSES TO PHQ9 QUESTIONS 1 & 2: 0
2. FEELING DOWN, DEPRESSED OR HOPELESS: 0
1. LITTLE INTEREST OR PLEASURE IN DOING THINGS: 0
SUM OF ALL RESPONSES TO PHQ QUESTIONS 1-9: 0

## 2024-01-09 NOTE — PROGRESS NOTES
route Expires 5 year from date of prescription, Disp: 1 each, Rfl: 0    omega-3 acid ethyl esters (LOVAZA) 1 g capsule, Take 2 capsules by mouth 2 times daily, Disp: 60 capsule, Rfl: 5    aspirin 81 MG tablet, Take 1 tablet by mouth daily, Disp: , Rfl:     Allergies   Allergen Reactions    Shellfish-Derived Products        Review of Systems   Constitutional:  Negative for fatigue and fever.   Eyes:  Negative for pain and redness.   Respiratory:  Negative for cough and shortness of breath.    Cardiovascular:  Negative for chest pain and palpitations.   Gastrointestinal:  Negative for abdominal pain, constipation, diarrhea, nausea and vomiting.   Musculoskeletal:  Positive for back pain.   Skin:  Negative for pallor and rash.          Objective   Vitals:    01/09/24 1331   BP: 132/82   Pulse: 58   Resp: 16   Temp: 97.2 °F (36.2 °C)   SpO2: 96%     Physical Exam  Vitals and nursing note reviewed.   Constitutional:       General: He is not in acute distress.     Appearance: Normal appearance.   Cardiovascular:      Rate and Rhythm: Normal rate and regular rhythm.      Pulses: Normal pulses.      Heart sounds: Normal heart sounds. No murmur heard.  Pulmonary:      Effort: Pulmonary effort is normal. No respiratory distress.      Breath sounds: Normal breath sounds. No wheezing or rhonchi.   Musculoskeletal:         General: No tenderness.      Comments: Si joint on left with palpation.  No lumbar paraspinal hypertonicity  No spinous process tenderness   Skin:     General: Skin is warm and dry.   Neurological:      General: No focal deficit present.      Mental Status: He is alert. Mental status is at baseline.      Sensory: No sensory deficit.      Motor: No weakness.            An electronic signature was used to authenticate this note.    --Theo Villa Jr., DO          **This report has been created using voice recognition software. It may contain minor errors which are inherent in voice recognition technology.**

## 2024-01-11 ENCOUNTER — HOSPITAL ENCOUNTER (OUTPATIENT)
Dept: AUDIOLOGY | Age: 69
Discharge: HOME | End: 2024-01-11

## 2024-01-11 PROCEDURE — 9990000010 HC NO CHARGE VISIT: Performed by: AUDIOLOGIST

## 2024-01-11 NOTE — PROGRESS NOTES
ACCOUNT #: [id number]    DIAGNOSIS: H90.3    HEARING AID PROBLEM: The patient't right Audeo L70-R hearing for the right ear has no working LED lights and no output. A hard reset was completed which restored hearing aid function. A firmware update was completed for both hearing aids. Both hearing aids were cleaned and checked. No issues noted.  filter, retention wire and dome were replaced on both hearing aids. N/C under warranty.

## 2024-02-28 DIAGNOSIS — I10 ESSENTIAL HYPERTENSION: ICD-10-CM

## 2024-02-28 RX ORDER — AMLODIPINE BESYLATE 10 MG/1
TABLET ORAL
Qty: 30 TABLET | Refills: 0 | Status: SHIPPED | OUTPATIENT
Start: 2024-02-28 | End: 2024-03-04 | Stop reason: SDUPTHER

## 2024-02-28 NOTE — TELEPHONE ENCOUNTER
30 day supply sent in  No showed to his appointment on 2/19  Please schedule his AWV and 6 months scripts can be written at that time.  Also- please have him get labs done prior   Thanks.

## 2024-03-04 ENCOUNTER — HOSPITAL ENCOUNTER (OUTPATIENT)
Age: 69
Setting detail: SPECIMEN
Discharge: HOME | End: 2024-03-04

## 2024-03-04 ENCOUNTER — OFFICE VISIT (OUTPATIENT)
Dept: FAMILY MEDICINE CLINIC | Age: 69
End: 2024-03-04
Payer: MEDICARE

## 2024-03-04 VITALS
WEIGHT: 176.3 LBS | TEMPERATURE: 97.7 F | OXYGEN SATURATION: 95 % | SYSTOLIC BLOOD PRESSURE: 134 MMHG | BODY MASS INDEX: 25.3 KG/M2 | DIASTOLIC BLOOD PRESSURE: 80 MMHG | RESPIRATION RATE: 18 BRPM | HEART RATE: 82 BPM

## 2024-03-04 DIAGNOSIS — M45.6 ANKYLOSING SPONDYLITIS OF LUMBAR REGION (HCC): ICD-10-CM

## 2024-03-04 DIAGNOSIS — R19.7 DIARRHEA, UNSPECIFIED TYPE: ICD-10-CM

## 2024-03-04 DIAGNOSIS — M25.512 CHRONIC LEFT SHOULDER PAIN: ICD-10-CM

## 2024-03-04 DIAGNOSIS — Z12.5 SCREENING FOR PROSTATE CANCER: ICD-10-CM

## 2024-03-04 DIAGNOSIS — I10 ESSENTIAL HYPERTENSION: ICD-10-CM

## 2024-03-04 DIAGNOSIS — G89.29 CHRONIC LEFT SHOULDER PAIN: ICD-10-CM

## 2024-03-04 DIAGNOSIS — G63 VITAMIN B12 DEFICIENCY NEUROPATHY (HCC): ICD-10-CM

## 2024-03-04 DIAGNOSIS — E78.2 HYPERCHOLESTEROLEMIA WITH HYPERTRIGLYCERIDEMIA: ICD-10-CM

## 2024-03-04 DIAGNOSIS — E03.9 HYPOTHYROIDISM, UNSPECIFIED TYPE: ICD-10-CM

## 2024-03-04 DIAGNOSIS — E53.8 VITAMIN B12 DEFICIENCY NEUROPATHY (HCC): ICD-10-CM

## 2024-03-04 DIAGNOSIS — G62.9 PERIPHERAL POLYNEUROPATHY: ICD-10-CM

## 2024-03-04 DIAGNOSIS — E03.9 HYPOTHYROIDISM, UNSPECIFIED TYPE: Primary | ICD-10-CM

## 2024-03-04 PROBLEM — M00.9 SEPTIC ARTHRITIS (HCC): Status: RESOLVED | Noted: 2023-08-07 | Resolved: 2024-03-04

## 2024-03-04 PROBLEM — S29.011A INTERCOSTAL MUSCLE STRAIN: Status: RESOLVED | Noted: 2020-05-18 | Resolved: 2024-03-04

## 2024-03-04 PROBLEM — R55 PRE-SYNCOPE: Status: RESOLVED | Noted: 2023-10-02 | Resolved: 2024-03-04

## 2024-03-04 PROBLEM — R07.82 INTERCOSTAL PAIN: Status: RESOLVED | Noted: 2020-05-18 | Resolved: 2024-03-04

## 2024-03-04 LAB
BASOPHILS # BLD: 0.04 K/UL (ref 0–0.2)
BASOPHILS NFR BLD: 1 % (ref 0–2)
EOSINOPHIL # BLD: 0.3 K/UL (ref 0–0.44)
EOSINOPHILS RELATIVE PERCENT: 4 % (ref 1–4)
ERYTHROCYTE [DISTWIDTH] IN BLOOD BY AUTOMATED COUNT: 13.1 % (ref 11.8–14.4)
HCT VFR BLD AUTO: 46.1 % (ref 40.7–50.3)
HGB BLD-MCNC: 15 G/DL (ref 13–17)
IMM GRANULOCYTES # BLD AUTO: 0.04 K/UL (ref 0–0.3)
IMM GRANULOCYTES NFR BLD: 1 %
LYMPHOCYTES NFR BLD: 2.19 K/UL (ref 1.1–3.7)
LYMPHOCYTES RELATIVE PERCENT: 25 % (ref 24–43)
MCH RBC QN AUTO: 28 PG (ref 25.2–33.5)
MCHC RBC AUTO-ENTMCNC: 32.5 G/DL (ref 28.4–34.8)
MCV RBC AUTO: 86 FL (ref 82.6–102.9)
MONOCYTES NFR BLD: 0.83 K/UL (ref 0.1–1.2)
MONOCYTES NFR BLD: 10 % (ref 3–12)
NEUTROPHILS NFR BLD: 59 % (ref 36–65)
NEUTS SEG NFR BLD: 5.27 K/UL (ref 1.5–8.1)
NRBC BLD-RTO: 0 PER 100 WBC
PLATELET # BLD AUTO: 268 K/UL (ref 138–453)
PMV BLD AUTO: 12 FL (ref 8.1–13.5)
RBC # BLD AUTO: 5.36 M/UL (ref 4.21–5.77)
WBC OTHER # BLD: 8.7 K/UL (ref 3.5–11.3)

## 2024-03-04 PROCEDURE — 1123F ACP DISCUSS/DSCN MKR DOCD: CPT | Performed by: NURSE PRACTITIONER

## 2024-03-04 PROCEDURE — G8484 FLU IMMUNIZE NO ADMIN: HCPCS | Performed by: NURSE PRACTITIONER

## 2024-03-04 PROCEDURE — 1036F TOBACCO NON-USER: CPT | Performed by: NURSE PRACTITIONER

## 2024-03-04 PROCEDURE — 3075F SYST BP GE 130 - 139MM HG: CPT | Performed by: NURSE PRACTITIONER

## 2024-03-04 PROCEDURE — G8417 CALC BMI ABV UP PARAM F/U: HCPCS | Performed by: NURSE PRACTITIONER

## 2024-03-04 PROCEDURE — 3017F COLORECTAL CA SCREEN DOC REV: CPT | Performed by: NURSE PRACTITIONER

## 2024-03-04 PROCEDURE — 99214 OFFICE O/P EST MOD 30 MIN: CPT | Performed by: NURSE PRACTITIONER

## 2024-03-04 PROCEDURE — 3079F DIAST BP 80-89 MM HG: CPT | Performed by: NURSE PRACTITIONER

## 2024-03-04 PROCEDURE — G8427 DOCREV CUR MEDS BY ELIG CLIN: HCPCS | Performed by: NURSE PRACTITIONER

## 2024-03-04 RX ORDER — AMLODIPINE BESYLATE 10 MG/1
TABLET ORAL
Qty: 90 TABLET | Refills: 1 | Status: SHIPPED | OUTPATIENT
Start: 2024-03-04

## 2024-03-04 RX ORDER — GABAPENTIN 300 MG/1
300 CAPSULE ORAL NIGHTLY
Qty: 90 CAPSULE | Refills: 1 | Status: SHIPPED | OUTPATIENT
Start: 2024-03-04 | End: 2024-08-31

## 2024-03-04 RX ORDER — ATORVASTATIN CALCIUM 80 MG/1
80 TABLET, FILM COATED ORAL NIGHTLY
Qty: 90 TABLET | Refills: 1 | Status: SHIPPED | OUTPATIENT
Start: 2024-03-04

## 2024-03-04 SDOH — ECONOMIC STABILITY: FOOD INSECURITY: WITHIN THE PAST 12 MONTHS, YOU WORRIED THAT YOUR FOOD WOULD RUN OUT BEFORE YOU GOT MONEY TO BUY MORE.: NEVER TRUE

## 2024-03-04 SDOH — ECONOMIC STABILITY: FOOD INSECURITY: WITHIN THE PAST 12 MONTHS, THE FOOD YOU BOUGHT JUST DIDN'T LAST AND YOU DIDN'T HAVE MONEY TO GET MORE.: NEVER TRUE

## 2024-03-04 SDOH — ECONOMIC STABILITY: INCOME INSECURITY: HOW HARD IS IT FOR YOU TO PAY FOR THE VERY BASICS LIKE FOOD, HOUSING, MEDICAL CARE, AND HEATING?: NOT HARD AT ALL

## 2024-03-04 ASSESSMENT — ENCOUNTER SYMPTOMS
EYE DISCHARGE: 0
DIARRHEA: 0
RHINORRHEA: 0
CONSTIPATION: 0
SHORTNESS OF BREATH: 0
COUGH: 0
CHEST TIGHTNESS: 0
ABDOMINAL PAIN: 0
VOMITING: 0

## 2024-03-04 ASSESSMENT — PATIENT HEALTH QUESTIONNAIRE - PHQ9
2. FEELING DOWN, DEPRESSED OR HOPELESS: 0
SUM OF ALL RESPONSES TO PHQ QUESTIONS 1-9: 0
SUM OF ALL RESPONSES TO PHQ9 QUESTIONS 1 & 2: 0
SUM OF ALL RESPONSES TO PHQ QUESTIONS 1-9: 0
1. LITTLE INTEREST OR PLEASURE IN DOING THINGS: 0

## 2024-03-04 NOTE — PROGRESS NOTES
of Celebrex and this did help with his pain  Neuropathy is most bothersome in the evening and is taking Neurontin 100 mg.  Is wondering if he can increase this  Has a handicap placard that he will use.  Does still stay busy with yard and housework    History of hearing loss.  Does have hearing aids that he uses.         Review of Systems   Constitutional:  Negative for activity change, appetite change and fever.   HENT:  Positive for hearing loss. Negative for congestion, ear pain and rhinorrhea.    Eyes:  Negative for discharge and visual disturbance.   Respiratory:  Negative for cough, chest tightness and shortness of breath.    Cardiovascular:  Negative for chest pain and palpitations.   Gastrointestinal:  Negative for abdominal pain, constipation, diarrhea and vomiting.   Genitourinary:  Negative for difficulty urinating and hematuria.   Musculoskeletal:  Positive for arthralgias and myalgias.   Skin:  Negative for rash.   Neurological:  Positive for numbness. Negative for dizziness, weakness and headaches.   Psychiatric/Behavioral:  The patient is not nervous/anxious.         Allergies   Allergen Reactions    Shellfish-Derived Products        Outpatient Medications Prior to Visit   Medication Sig Dispense Refill    levothyroxine (EUTHYROX) 25 MCG tablet TAKE 1 TABLET BY MOUTH ONCE DAILY WITH  WATER  ONLY  ON  AN  EMPTY  STOMACH  WAIT  30  MINUTES  BEFORE  EATING  OR  TAKING  OTHER  MEDS 90 tablet 2    moxifloxacin (VIGAMOX) 0.5 % ophthalmic solution INSTILL 1 DROP INTO LEFT EYE 4 TIMES DAILY AS DIRECTED. PLEASE SEE INSTRUCTIONS SENT HOME WITH YOU AT YOUR LAST VISIT      prednisoLONE acetate (PRED FORTE) 1 % ophthalmic suspension INSTILL DROP FOR 1 DAY INTO LEFT EYE 4 TIMES DAILY SEE SURGERY INSTRUCTIONS SENT HOME WITH YOU AT YOUR LAST VISIT      fenofibrate (TRIGLIDE) 160 MG tablet Take 1 tablet by mouth daily 90 tablet 3    Handicap Placard MISC by Does not apply route Expires 5 year from date of prescription

## 2024-03-05 ENCOUNTER — PATIENT MESSAGE (OUTPATIENT)
Dept: FAMILY MEDICINE CLINIC | Age: 69
End: 2024-03-05

## 2024-03-05 LAB
ALBUMIN SERPL-MCNC: 4.1 G/DL (ref 3.5–5.2)
ALBUMIN/GLOB SERPL: 1.2 {RATIO} (ref 1–2.5)
ALP SERPL-CCNC: 50 U/L (ref 40–129)
ALT SERPL-CCNC: 15 U/L (ref 5–41)
ANION GAP SERPL CALCULATED.3IONS-SCNC: 10 MMOL/L (ref 9–17)
AST SERPL-CCNC: 23 U/L
BILIRUB SERPL-MCNC: 0.6 MG/DL (ref 0.3–1.2)
BUN SERPL-MCNC: 18 MG/DL (ref 8–23)
CALCIUM SERPL-MCNC: 8.9 MG/DL (ref 8.6–10.4)
CHLORIDE SERPL-SCNC: 109 MMOL/L (ref 98–107)
CHOLEST SERPL-MCNC: 212 MG/DL
CHOLESTEROL/HDL RATIO: 6.4
CO2 SERPL-SCNC: 22 MMOL/L (ref 20–31)
CREAT SERPL-MCNC: 1.3 MG/DL (ref 0.7–1.2)
FOLATE SERPL-MCNC: 7.3 NG/ML (ref 4.8–24.2)
GFR SERPL CREATININE-BSD FRML MDRD: 59 ML/MIN/1.73M2
GLUCOSE P FAST SERPL-MCNC: 75 MG/DL (ref 70–99)
HDLC SERPL-MCNC: 33 MG/DL
LDLC SERPL CALC-MCNC: 130 MG/DL (ref 0–130)
MAGNESIUM SERPL-MCNC: 2.1 MG/DL (ref 1.6–2.6)
POTASSIUM SERPL-SCNC: 3.8 MMOL/L (ref 3.7–5.3)
PROT SERPL-MCNC: 7.4 G/DL (ref 6.4–8.3)
PSA SERPL-MCNC: 0.4 NG/ML (ref 0–4)
SODIUM SERPL-SCNC: 141 MMOL/L (ref 135–144)
TRIGL SERPL-MCNC: 247 MG/DL
TSH SERPL DL<=0.05 MIU/L-ACNC: 3.96 UIU/ML (ref 0.3–5)
VIT B12 SERPL-MCNC: 311 PG/ML (ref 232–1245)

## 2024-03-06 NOTE — TELEPHONE ENCOUNTER
From: Zeke Orellana  To: Blessing Herrera  Sent: 3/5/2024 9:16 PM EST  Subject: Bloodwork    When you are talking about my chronic kidney disease what are you talking about? I was not aware of any CKD and my bloodwork does not show that there is any issue with my kidneys.

## 2024-04-10 ENCOUNTER — TELEPHONE (OUTPATIENT)
Dept: NEUROLOGY | Age: 69
End: 2024-04-10

## 2024-04-10 NOTE — TELEPHONE ENCOUNTER
I spoke with the pt and notified him of the rescheduled clinic visit from 4/17 to 6/4, and reminded pt of 4/26/24 7:40am CTA, with 30 min early arrival. Pt verbalized understanding.

## 2024-04-18 ENCOUNTER — HOSPITAL ENCOUNTER (OUTPATIENT)
Dept: AUDIOLOGY | Age: 69
Discharge: HOME | End: 2024-04-18

## 2024-04-18 PROCEDURE — 9990000010 HC NO CHARGE VISIT: Performed by: AUDIOLOGIST

## 2024-04-18 NOTE — PROGRESS NOTES
ACCOUNT #: [id number]    DIAGNOSIS: H90.3    HEARING AID PROBLEM (Phondemario Zavaletao L70-R fit 02/21/23, warranty 05/11/25): Right hearing aid not producing any output. Listening check completed for both hearing aids. Right hearing aid is dead, no issues noted with the left hearing aid. Cleaned both hearing aids and replace the  on each. Listening check revealed good sonu sensitivity and output for each. Patient to follow up as needed. He is aware that his warranty expires 05/15/25. N/C under warranty.

## 2024-04-26 ENCOUNTER — HOSPITAL ENCOUNTER (OUTPATIENT)
Dept: CT IMAGING | Age: 69
Discharge: HOME | End: 2024-04-26
Payer: MEDICARE

## 2024-04-26 DIAGNOSIS — I65.21 CAROTID STENOSIS, ASYMPTOMATIC, RIGHT: ICD-10-CM

## 2024-04-26 LAB — POC CREATININE WHOLE BLOOD: 1.3 MG/DL (ref 0.5–1.2)

## 2024-04-26 PROCEDURE — 82565 ASSAY OF CREATININE: CPT

## 2024-04-26 PROCEDURE — 70498 CT ANGIOGRAPHY NECK: CPT

## 2024-04-26 PROCEDURE — 6360000004 HC RX CONTRAST MEDICATION: Performed by: NURSE PRACTITIONER

## 2024-04-26 RX ADMIN — IOPAMIDOL 80 ML: 755 INJECTION, SOLUTION INTRAVENOUS at 07:12

## 2024-04-26 RX ADMIN — IOPAMIDOL 80 ML: 755 INJECTION, SOLUTION INTRAVENOUS at 07:04

## 2024-05-09 ENCOUNTER — HOSPITAL ENCOUNTER (OUTPATIENT)
Dept: AUDIOLOGY | Age: 69
Discharge: HOME | End: 2024-05-09

## 2024-05-09 PROCEDURE — 9990000010 HC NO CHARGE VISIT: Performed by: AUDIOLOGIST

## 2024-05-09 NOTE — PROGRESS NOTES
ACCOUNT #: [id number]    DIAGNOSIS: H90.3    HEARING AID PROBLEM: The patient's LEFT hearing aid is in shipping mode. Once shiopping mode was disabled a listening check the hearing aid was working as expected. No issues noted with listening check. Repeated instructed to the patient on how to remove a hearing aid from shipping mode. He expressed understanding. N/C

## 2024-06-10 RX ORDER — FENOFIBRATE 160 MG/1
160 TABLET ORAL DAILY
Qty: 90 TABLET | Refills: 0 | Status: SHIPPED | OUTPATIENT
Start: 2024-06-10

## 2024-06-17 RX ORDER — FENOFIBRATE 160 MG/1
160 TABLET ORAL DAILY
Qty: 90 TABLET | Refills: 0 | Status: SHIPPED | OUTPATIENT
Start: 2024-06-17

## 2024-07-11 ENCOUNTER — HOSPITAL ENCOUNTER (OUTPATIENT)
Dept: AUDIOLOGY | Age: 69
Discharge: HOME | End: 2024-07-11

## 2024-07-11 PROCEDURE — 9990000010 HC NO CHARGE VISIT: Performed by: AUDIOLOGIST

## 2024-07-11 NOTE — PROGRESS NOTES
ACCOUNT #: [id number]    DIAGNOSIS: H90.3    HEARING AID PROBLEM: (Ruddy Daivla L70-R fit 02/21/23, warranty 05/11/25)  The patient is not hearing well and people are stating that he sounds muffled and far away on the phone. The hearing aid mics were occluded with debris on both hearing aids. Cleaned the hearing aids and replaced the  filters and domes on each hearing aid. The patient noted good benefit and phone streaming was back to normal. Reminded patient to brush his hearing aids daily when he is done wearing them. N/C under warranty.

## 2024-08-27 DIAGNOSIS — G62.9 PERIPHERAL POLYNEUROPATHY: ICD-10-CM

## 2024-08-27 DIAGNOSIS — M25.512 CHRONIC LEFT SHOULDER PAIN: ICD-10-CM

## 2024-08-27 DIAGNOSIS — G89.29 CHRONIC LEFT SHOULDER PAIN: ICD-10-CM

## 2024-08-27 RX ORDER — GABAPENTIN 300 MG/1
CAPSULE ORAL
Qty: 90 CAPSULE | Refills: 0 | Status: SHIPPED | OUTPATIENT
Start: 2024-08-27 | End: 2024-11-25

## 2024-09-03 ENCOUNTER — HOSPITAL ENCOUNTER (OUTPATIENT)
Dept: AUDIOLOGY | Age: 69
Discharge: HOME | End: 2024-09-03

## 2024-09-03 PROCEDURE — 9990000010 HC NO CHARGE VISIT: Performed by: AUDIOLOGIST

## 2024-09-03 NOTE — PROGRESS NOTES
ACCOUNT #: [id number]    DIAGNOSIS: H90.3    HEARING AID PROBLEM: (Phonak Waqaro L70-R fit 02/21/23, warranty 05/11/25) The patient's hearing aids will not connect to the [employer] barb since he purchased a new I phone. I walked the patient through the hearing aid pairing process to his barb. His hearing aids were successfully paired to the barb with normal barb function noted. I also activated voice isolation for phone streaming. I managed the alerts on his new phone. Key and lock screen sounds were turned off. He also asked for audible calendar alerts to be shut off. He will follow up with any problems. N/C.

## 2024-09-07 DIAGNOSIS — E03.9 HYPOTHYROIDISM, UNSPECIFIED TYPE: ICD-10-CM

## 2024-09-09 RX ORDER — LEVOTHYROXINE SODIUM 25 UG/1
TABLET ORAL
Qty: 90 TABLET | Refills: 0 | Status: SHIPPED | OUTPATIENT
Start: 2024-09-09

## 2024-09-19 DIAGNOSIS — E78.2 HYPERCHOLESTEROLEMIA WITH HYPERTRIGLYCERIDEMIA: ICD-10-CM

## 2024-09-19 DIAGNOSIS — I10 ESSENTIAL HYPERTENSION: ICD-10-CM

## 2024-09-19 RX ORDER — ATORVASTATIN CALCIUM 80 MG/1
80 TABLET, FILM COATED ORAL NIGHTLY
Qty: 90 TABLET | Refills: 0 | Status: SHIPPED | OUTPATIENT
Start: 2024-09-19

## 2024-09-19 RX ORDER — AMLODIPINE BESYLATE 10 MG/1
TABLET ORAL
Qty: 90 TABLET | Refills: 0 | Status: SHIPPED | OUTPATIENT
Start: 2024-09-19

## 2024-09-30 DIAGNOSIS — I10 ESSENTIAL HYPERTENSION: ICD-10-CM

## 2024-09-30 RX ORDER — AMLODIPINE BESYLATE 10 MG/1
TABLET ORAL
Qty: 90 TABLET | Refills: 0 | Status: SHIPPED | OUTPATIENT
Start: 2024-09-30

## 2024-11-26 DIAGNOSIS — G62.9 PERIPHERAL POLYNEUROPATHY: ICD-10-CM

## 2024-11-26 DIAGNOSIS — G89.29 CHRONIC LEFT SHOULDER PAIN: ICD-10-CM

## 2024-11-26 DIAGNOSIS — M25.512 CHRONIC LEFT SHOULDER PAIN: ICD-10-CM

## 2024-11-27 RX ORDER — GABAPENTIN 300 MG/1
300 CAPSULE ORAL DAILY
Qty: 90 CAPSULE | Refills: 0 | Status: SHIPPED | OUTPATIENT
Start: 2024-11-27 | End: 2025-02-25

## 2024-12-04 DIAGNOSIS — E03.9 HYPOTHYROIDISM, UNSPECIFIED TYPE: ICD-10-CM

## 2024-12-04 RX ORDER — LEVOTHYROXINE SODIUM 25 UG/1
TABLET ORAL
Qty: 90 TABLET | Refills: 0 | Status: SHIPPED | OUTPATIENT
Start: 2024-12-04

## 2024-12-05 RX ORDER — FENOFIBRATE 160 MG/1
160 TABLET ORAL DAILY
Qty: 90 TABLET | Refills: 0 | Status: SHIPPED | OUTPATIENT
Start: 2024-12-05

## 2024-12-12 ENCOUNTER — OFFICE VISIT (OUTPATIENT)
Dept: FAMILY MEDICINE CLINIC | Age: 69
End: 2024-12-12

## 2024-12-12 VITALS
TEMPERATURE: 98.4 F | DIASTOLIC BLOOD PRESSURE: 72 MMHG | BODY MASS INDEX: 26.43 KG/M2 | HEIGHT: 70 IN | SYSTOLIC BLOOD PRESSURE: 132 MMHG | WEIGHT: 184.6 LBS | OXYGEN SATURATION: 95 % | RESPIRATION RATE: 18 BRPM | HEART RATE: 61 BPM

## 2024-12-12 DIAGNOSIS — R03.0 ELEVATED BLOOD PRESSURE READING: ICD-10-CM

## 2024-12-12 DIAGNOSIS — J01.90 ACUTE BACTERIAL SINUSITIS: Primary | ICD-10-CM

## 2024-12-12 DIAGNOSIS — B96.89 ACUTE BACTERIAL SINUSITIS: Primary | ICD-10-CM

## 2024-12-12 ASSESSMENT — ENCOUNTER SYMPTOMS
CONSTIPATION: 0
COUGH: 1
RHINORRHEA: 0
SORE THROAT: 1
ABDOMINAL PAIN: 0
EYE DISCHARGE: 0
SINUS PRESSURE: 1
CHEST TIGHTNESS: 0
VOMITING: 0
DIARRHEA: 0
SHORTNESS OF BREATH: 0

## 2024-12-12 NOTE — PROGRESS NOTES
Health Maintenance Due   Topic Date Due    DTaP/Tdap/Td vaccine (1 - Tdap) Never done    Shingles vaccine (1 of 2) Never done    Annual Wellness Visit (Medicare)  02/17/2024    Flu vaccine (1) 08/01/2024    COVID-19 Vaccine (3 - 2023-24 season) 09/01/2024       
rhythm.      Heart sounds: Normal heart sounds. No murmur heard.     No friction rub. No gallop.   Pulmonary:      Effort: Pulmonary effort is normal.      Breath sounds: Normal breath sounds.   Abdominal:      General: Bowel sounds are normal.      Palpations: Abdomen is soft.      Tenderness: There is no abdominal tenderness.   Musculoskeletal:         General: Normal range of motion.      Cervical back: Normal range of motion and neck supple. No spinous process tenderness.   Lymphadenopathy:      Head:      Right side of head: Occipital adenopathy present.      Left side of head: Occipital adenopathy present.   Skin:     General: Skin is warm and dry.      Findings: No erythema or rash.   Neurological:      Mental Status: He is alert and oriented to person, place, and time.   Psychiatric:         Speech: Speech normal.         Behavior: Behavior normal.         Thought Content: Thought content normal.       Data Reviewed and Summarized       Labs:     Imaging/Testing:        HEVER HERNANDEZ - CNP

## 2024-12-17 DIAGNOSIS — I10 ESSENTIAL HYPERTENSION: ICD-10-CM

## 2024-12-18 RX ORDER — AMLODIPINE BESYLATE 10 MG/1
TABLET ORAL
Qty: 90 TABLET | Refills: 0 | Status: SHIPPED | OUTPATIENT
Start: 2024-12-18

## 2024-12-28 DIAGNOSIS — E78.2 HYPERCHOLESTEROLEMIA WITH HYPERTRIGLYCERIDEMIA: ICD-10-CM

## 2024-12-29 DIAGNOSIS — E78.2 HYPERCHOLESTEROLEMIA WITH HYPERTRIGLYCERIDEMIA: ICD-10-CM

## 2024-12-30 RX ORDER — ATORVASTATIN CALCIUM 80 MG/1
80 TABLET, FILM COATED ORAL NIGHTLY
Qty: 90 TABLET | Refills: 0 | Status: SHIPPED | OUTPATIENT
Start: 2024-12-30

## 2025-01-27 ENCOUNTER — OFFICE VISIT (OUTPATIENT)
Dept: FAMILY MEDICINE CLINIC | Age: 70
End: 2025-01-27

## 2025-01-27 VITALS
DIASTOLIC BLOOD PRESSURE: 80 MMHG | OXYGEN SATURATION: 97 % | BODY MASS INDEX: 25.48 KG/M2 | HEIGHT: 70 IN | TEMPERATURE: 97.9 F | HEART RATE: 81 BPM | WEIGHT: 178 LBS | RESPIRATION RATE: 16 BRPM | SYSTOLIC BLOOD PRESSURE: 160 MMHG

## 2025-01-27 DIAGNOSIS — I10 ESSENTIAL HYPERTENSION: ICD-10-CM

## 2025-01-27 DIAGNOSIS — Z00.00 MEDICARE ANNUAL WELLNESS VISIT, SUBSEQUENT: Primary | ICD-10-CM

## 2025-01-27 DIAGNOSIS — E78.2 HYPERCHOLESTEROLEMIA WITH HYPERTRIGLYCERIDEMIA: ICD-10-CM

## 2025-01-27 DIAGNOSIS — E03.9 HYPOTHYROIDISM, UNSPECIFIED TYPE: ICD-10-CM

## 2025-01-27 DIAGNOSIS — G89.29 CHRONIC LEFT SHOULDER PAIN: ICD-10-CM

## 2025-01-27 DIAGNOSIS — M25.512 CHRONIC LEFT SHOULDER PAIN: ICD-10-CM

## 2025-01-27 DIAGNOSIS — Z12.5 SCREENING FOR PROSTATE CANCER: ICD-10-CM

## 2025-01-27 DIAGNOSIS — F43.0 STRESS REACTION: ICD-10-CM

## 2025-01-27 DIAGNOSIS — G62.9 PERIPHERAL POLYNEUROPATHY: ICD-10-CM

## 2025-01-27 RX ORDER — ATORVASTATIN CALCIUM 80 MG/1
80 TABLET, FILM COATED ORAL NIGHTLY
Qty: 90 TABLET | Refills: 1 | Status: SHIPPED | OUTPATIENT
Start: 2025-01-27

## 2025-01-27 RX ORDER — FENOFIBRATE 160 MG/1
160 TABLET ORAL DAILY
Qty: 90 TABLET | Refills: 1 | Status: SHIPPED | OUTPATIENT
Start: 2025-01-27

## 2025-01-27 RX ORDER — GABAPENTIN 300 MG/1
300 CAPSULE ORAL DAILY
Qty: 90 CAPSULE | Refills: 1 | Status: SHIPPED | OUTPATIENT
Start: 2025-01-27 | End: 2025-01-27 | Stop reason: SDUPTHER

## 2025-01-27 RX ORDER — AMLODIPINE BESYLATE 10 MG/1
TABLET ORAL
Qty: 90 TABLET | Refills: 1 | Status: SHIPPED | OUTPATIENT
Start: 2025-01-27

## 2025-01-27 RX ORDER — GABAPENTIN 300 MG/1
300 CAPSULE ORAL 2 TIMES DAILY
Qty: 360 CAPSULE | Refills: 0 | Status: SHIPPED | OUTPATIENT
Start: 2025-01-27 | End: 2025-07-26

## 2025-01-27 RX ORDER — LEVOTHYROXINE SODIUM 25 UG/1
TABLET ORAL
Qty: 90 TABLET | Refills: 1 | Status: SHIPPED | OUTPATIENT
Start: 2025-01-27

## 2025-01-27 SDOH — ECONOMIC STABILITY: FOOD INSECURITY: WITHIN THE PAST 12 MONTHS, THE FOOD YOU BOUGHT JUST DIDN'T LAST AND YOU DIDN'T HAVE MONEY TO GET MORE.: NEVER TRUE

## 2025-01-27 SDOH — ECONOMIC STABILITY: FOOD INSECURITY: WITHIN THE PAST 12 MONTHS, YOU WORRIED THAT YOUR FOOD WOULD RUN OUT BEFORE YOU GOT MONEY TO BUY MORE.: NEVER TRUE

## 2025-01-27 ASSESSMENT — PATIENT HEALTH QUESTIONNAIRE - PHQ9
SUM OF ALL RESPONSES TO PHQ QUESTIONS 1-9: 0
1. LITTLE INTEREST OR PLEASURE IN DOING THINGS: NOT AT ALL
SUM OF ALL RESPONSES TO PHQ9 QUESTIONS 1 & 2: 0
SUM OF ALL RESPONSES TO PHQ QUESTIONS 1-9: 0
2. FEELING DOWN, DEPRESSED OR HOPELESS: NOT AT ALL

## 2025-01-27 ASSESSMENT — LIFESTYLE VARIABLES
HOW MANY STANDARD DRINKS CONTAINING ALCOHOL DO YOU HAVE ON A TYPICAL DAY: 1 OR 2
HOW OFTEN DO YOU HAVE A DRINK CONTAINING ALCOHOL: NEVER

## 2025-01-27 NOTE — PROGRESS NOTES
Health Maintenance Due   Topic Date Due    DTaP/Tdap/Td vaccine (1 - Tdap) Never done    Shingles vaccine (1 of 2) Never done    Annual Wellness Visit (Medicare)  02/17/2024    Flu vaccine (1) 08/01/2024    COVID-19 Vaccine (3 - 2023-24 season) 09/01/2024
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
## 2025-03-09 DIAGNOSIS — G89.29 CHRONIC LEFT SHOULDER PAIN: ICD-10-CM

## 2025-03-09 DIAGNOSIS — M25.512 CHRONIC LEFT SHOULDER PAIN: ICD-10-CM

## 2025-03-09 DIAGNOSIS — G62.9 PERIPHERAL POLYNEUROPATHY: ICD-10-CM

## 2025-03-10 RX ORDER — GABAPENTIN 300 MG/1
300 CAPSULE ORAL 2 TIMES DAILY
Qty: 360 CAPSULE | Refills: 0 | Status: SHIPPED | OUTPATIENT
Start: 2025-03-10 | End: 2025-09-06

## 2025-03-24 ENCOUNTER — HOSPITAL ENCOUNTER (OUTPATIENT)
Dept: AUDIOLOGY | Age: 70
Discharge: HOME | End: 2025-03-24

## 2025-03-24 ENCOUNTER — OFFICE VISIT (OUTPATIENT)
Dept: FAMILY MEDICINE CLINIC | Age: 70
End: 2025-03-24
Payer: MEDICARE

## 2025-03-24 VITALS
TEMPERATURE: 97.8 F | SYSTOLIC BLOOD PRESSURE: 148 MMHG | BODY MASS INDEX: 25.74 KG/M2 | DIASTOLIC BLOOD PRESSURE: 70 MMHG | OXYGEN SATURATION: 97 % | WEIGHT: 179.8 LBS | RESPIRATION RATE: 18 BRPM | HEIGHT: 70 IN | HEART RATE: 70 BPM

## 2025-03-24 DIAGNOSIS — R03.0 ELEVATED BLOOD PRESSURE READING: ICD-10-CM

## 2025-03-24 DIAGNOSIS — J40 WHEEZY BRONCHITIS: ICD-10-CM

## 2025-03-24 DIAGNOSIS — R05.9 COUGH, UNSPECIFIED TYPE: Primary | ICD-10-CM

## 2025-03-24 DIAGNOSIS — I10 ESSENTIAL HYPERTENSION: ICD-10-CM

## 2025-03-24 LAB
Lab: NORMAL
QC PASS/FAIL: NORMAL
SARS-COV-2 RDRP RESP QL NAA+PROBE: NEGATIVE

## 2025-03-24 PROCEDURE — 1036F TOBACCO NON-USER: CPT | Performed by: NURSE PRACTITIONER

## 2025-03-24 PROCEDURE — V5267 HEARING AID SUP/ACCESS/DEV: HCPCS | Performed by: AUDIOLOGIST

## 2025-03-24 PROCEDURE — 3017F COLORECTAL CA SCREEN DOC REV: CPT | Performed by: NURSE PRACTITIONER

## 2025-03-24 PROCEDURE — 3078F DIAST BP <80 MM HG: CPT | Performed by: NURSE PRACTITIONER

## 2025-03-24 PROCEDURE — G8417 CALC BMI ABV UP PARAM F/U: HCPCS | Performed by: NURSE PRACTITIONER

## 2025-03-24 PROCEDURE — G8427 DOCREV CUR MEDS BY ELIG CLIN: HCPCS | Performed by: NURSE PRACTITIONER

## 2025-03-24 PROCEDURE — 99214 OFFICE O/P EST MOD 30 MIN: CPT | Performed by: NURSE PRACTITIONER

## 2025-03-24 PROCEDURE — 3077F SYST BP >= 140 MM HG: CPT | Performed by: NURSE PRACTITIONER

## 2025-03-24 PROCEDURE — 1123F ACP DISCUSS/DSCN MKR DOCD: CPT | Performed by: NURSE PRACTITIONER

## 2025-03-24 PROCEDURE — 87635 SARS-COV-2 COVID-19 AMP PRB: CPT | Performed by: NURSE PRACTITIONER

## 2025-03-24 RX ORDER — AZITHROMYCIN 250 MG/1
TABLET, FILM COATED ORAL
Qty: 6 TABLET | Refills: 0 | Status: SHIPPED | OUTPATIENT
Start: 2025-03-24

## 2025-03-24 RX ORDER — METHYLPREDNISOLONE 4 MG/1
TABLET ORAL
Qty: 1 KIT | Refills: 0 | Status: SHIPPED | OUTPATIENT
Start: 2025-03-24 | End: 2025-03-30

## 2025-03-24 RX ORDER — BENZONATATE 200 MG/1
200 CAPSULE ORAL 3 TIMES DAILY PRN
Qty: 21 CAPSULE | Refills: 0 | Status: SHIPPED | OUTPATIENT
Start: 2025-03-24 | End: 2025-03-31

## 2025-03-24 RX ORDER — ALBUTEROL SULFATE 90 UG/1
2 INHALANT RESPIRATORY (INHALATION) 4 TIMES DAILY PRN
Qty: 18 G | Refills: 0 | Status: SHIPPED | OUTPATIENT
Start: 2025-03-24

## 2025-03-24 ASSESSMENT — ENCOUNTER SYMPTOMS
SINUS PRESSURE: 1
WHEEZING: 1
DIARRHEA: 0
NAUSEA: 0
VOMITING: 0
SHORTNESS OF BREATH: 1
SINUS PAIN: 1
SORE THROAT: 1
CONSTIPATION: 0
ABDOMINAL PAIN: 0
CHEST TIGHTNESS: 0
COUGH: 1
RHINORRHEA: 1

## 2025-03-24 NOTE — PROGRESS NOTES
Patient purchased 1 package medium vented phonak hearing aid domes and 1 pack of filters. Patient paid $15.

## 2025-03-24 NOTE — PROGRESS NOTES
Health Maintenance Due   Topic Date Due    DTaP/Tdap/Td vaccine (1 - Tdap) Never done    Shingles vaccine (1 of 2) Never done    Flu vaccine (1) 08/01/2024    COVID-19 Vaccine (3 - 2024-25 season) 09/01/2024    Lipids  03/04/2025    GFR test (Diabetes, CKD 3-4, OR last GFR 15-59)  03/04/2025

## 2025-03-24 NOTE — PROGRESS NOTES
Zeke Orellana (:  1955) is a 69 y.o. male, Established patient, here for evaluation of the following chief complaint(s):  Illness (Thinks he had the flu- got better two days after now he feels like he has pneumonia or bronchitis. States he had this before and this is what it feels like. Notes at night he has a cough. Notes  is when he started feeling really bad )         Assessment & Plan  1. Wheezy bronchitis.  - Symptoms include a tickle in the throat, runny nose, sinus pressure, congestion, and a wheezy cough. The condition initially improved but worsened again.  - Physical exam reveals wheezing in the lungs. No sinus pressure or ear problems are noted.  - Discussed typical treatment options including albuterol inhaler, steroids, and antibiotics. Concerns about secondary bacterial infection due to the pattern of getting better and then worse.  - Prescriptions for albuterol inhaler, benzonatate for nighttime cough relief, Medrol Dosepak with dosing instructions, and Zithromax with a dosage of 2 pills on the first day and 1 pill from day 2 through 5. Prescriptions will be sent to [facility].    2. Elevated blood pressure.  - Blood pressure readings are slightly elevated.  - Advised to monitor blood pressure at home and contact the office if readings consistently exceed 140/90.  - Currently prescribed Norvasc for blood pressure management.    3. Medication management.  - Currently taking gabapentin 300 mg twice a day but has been taking it once a day. Advised to take it twice a day or both doses before bed if preferred.  - Has sufficient refills for Lipitor, Norvasc, and levothyroxine. If additional refills are needed, he should notify the office.      Results  Labs   - COVID-19 test: Negative  1. Cough, unspecified type  -     POCT COVID-19 Rapid, NAAT  2. Wheezy bronchitis  -     albuterol sulfate HFA (VENTOLIN HFA) 108 (90 Base) MCG/ACT inhaler; Inhale 2 puffs into the lungs 4

## 2025-04-02 DIAGNOSIS — E03.9 HYPOTHYROIDISM, UNSPECIFIED TYPE: ICD-10-CM

## 2025-04-02 RX ORDER — LEVOTHYROXINE SODIUM 25 UG/1
TABLET ORAL
Qty: 90 TABLET | Refills: 1 | OUTPATIENT
Start: 2025-04-02

## 2025-04-02 NOTE — TELEPHONE ENCOUNTER
Zeke Orellana called requesting a refill on the following medications:  Requested Prescriptions     Pending Prescriptions Disp Refills    levothyroxine (SYNTHROID) 25 MCG tablet 90 tablet 1     Sig: TAKE 1 TABLET BY MOUTH ONCE DAILY ON AN EMPTY STOMACH WITH  WATER  AND  WAIT  30  MINUTES  BEFORE  EATING  OR  TAKING  OTHER  MEDS       Date of last visit: 3/24/2025  Date of next visit (if applicable):7/28/2025  Date of last refill: 01/27/2025 for 90 days and 1 refill  [facility] Name: [facility],  Erika Pelaez MA

## 2025-04-07 DIAGNOSIS — E03.9 HYPOTHYROIDISM, UNSPECIFIED TYPE: ICD-10-CM

## 2025-04-08 RX ORDER — LEVOTHYROXINE SODIUM 25 UG/1
TABLET ORAL
Qty: 90 TABLET | Refills: 1 | Status: SHIPPED | OUTPATIENT
Start: 2025-04-08

## 2025-04-08 NOTE — TELEPHONE ENCOUNTER
Zeke Orellana called requesting a refill on the following medications:  Requested Prescriptions     Pending Prescriptions Disp Refills    levothyroxine (SYNTHROID) 25 MCG tablet 90 tablet 1     Sig: TAKE 1 TABLET BY MOUTH ONCE DAILY ON AN EMPTY STOMACH WITH  WATER  AND  WAIT  30  MINUTES  BEFORE  EATING  OR  TAKING  OTHER  MEDS       Date of last visit: 3/24/2025  Date of next visit (if applicable):7/28/2025  Date of last refill: 01/27/2025  Pharm[facility] Name: [facility],  Erika Pelaez MA

## 2025-04-21 ENCOUNTER — TELEPHONE (OUTPATIENT)
Dept: ENT CLINIC | Age: 70
End: 2025-04-21

## 2025-04-21 ENCOUNTER — HOSPITAL ENCOUNTER (OUTPATIENT)
Dept: AUDIOLOGY | Age: 70
Discharge: HOME | End: 2025-04-21

## 2025-04-21 PROCEDURE — 9990000010 HC NO CHARGE VISIT: Performed by: AUDIOLOGIST

## 2025-04-21 NOTE — TELEPHONE ENCOUNTER
The Pt called to schedule an Hearing Test with Dr Villa Elam. I then explained to the Pt that Dr Elam had left the practice. I then checked & found he last saw Dr Elam on 1/12/23. Does the Pt need a New referral to see any of the Other providers in the office. Could you please call the Pt & let him know either way. He wants to be seen ASAP [phone].

## 2025-04-21 NOTE — PROGRESS NOTES
HEARING AID PROBLEM: Left hearing aid working intermittently over the past few weeks and patient explains that he increases volume x 2 daily since he was sick about a month ago. The left HA stopped working altogether yesterday, but it is working again this morning. Push button is also not functioning appropriately. Brushed debris from microphones. Replaced 4.0 receivers with 5.0 due to 4.0 wax filters being discontinued. The left retention wire is a loaner (did not have correct size in stock- will order and contact patient when it is in). Also, will give patient wax filters- we are currently out of stock. Replaced domes and retention wires. Showed patient how to replace cerustop wax filters. Patient has a few meetings this week. He will drop the left HA off next Monday to be sent in for repair. Otoscopy revealed a clear canal for both ears with a yellow appearing tympanic membrane for the left ear. Recommended to patient that he follow up with PCP regarding abnormal appearance to left tympanic membrane. Tympanometry is recommended.

## 2025-05-05 ENCOUNTER — TELEPHONE (OUTPATIENT)
Dept: AUDIOLOGY | Age: 70
End: 2025-05-05

## 2025-05-05 NOTE — TELEPHONE ENCOUNTER
Patient stopped by Audiology department today. Replaced left  (loaner) with a new one- did not have the correct size in stock at his last appointment. He stated that the hearing aid worked much better with the loaner . Patient would like the program switch fixed. He has an important meeting today, but will drop off the left hearing aid tomorrow to be sent in for repair before warranty expires on 5/15/25. He also picked up more wax filters, per CM, because we were out of stock on those at his last appointment.

## 2025-05-20 NOTE — PROGRESS NOTES
[facility] [place] [facility]  [facility] EAR, NOSE AND THROAT  [address]  [address]  [facility] [address]  Dept: [phone]  Dept Fax: [phone]  Loc: [phone]    Zeke Orellana is a 70 y.o. male who was referred by No ref. provider found and I reviewed their note for:  Chief Complaint   Patient presents with    Follow-up     Patient is here for a f/u for his ears. Patient is recovering from the flu. He thinks he may have fluid on the L ear. He says the R one is fine.    .     HPI:     Zeke Orellana is a 70 y.o. male presenting with abnormal appearance of L TM.  Hx L tube placement 1/12/2023 in office with Dr. Elam, hx of multiple tubes, and pt also wears hearing aids. Pt had the Flu couple of weeks ago and had bilateral ear pressure and serous effusion. R ear fluid resolved on its own, but pt still has L ear pressure,  muffled hearing, \"sounds like he is under water\". He denies ear drainage and pain. Denies hx of allergies.     History:     Allergies   Allergen Reactions    Shellfish-Derived Products      Current Outpatient Medications   Medication Sig Dispense Refill    levothyroxine (SYNTHROID) 25 MCG tablet TAKE 1 TABLET BY MOUTH ONCE DAILY ON AN EMPTY STOMACH WITH  WATER  AND  WAIT  30  MINUTES  BEFORE  EATING  OR  TAKING  OTHER  MEDS 90 tablet 1    albuterol sulfate HFA (VENTOLIN HFA) 108 (90 Base) MCG/ACT inhaler Inhale 2 puffs into the lungs 4 times daily as needed for Wheezing 18 g 0    gabapentin (NEURONTIN) 300 MG capsule Take 1 capsule by mouth in the morning and at bedtime for 180 days. 360 capsule 0    amLODIPine (NORVASC) 10 MG tablet Take 1 tablet by mouth once daily 90 tablet 1    fenofibrate 160 MG tablet Take 1 tablet by mouth daily 90 tablet 1    atorvastatin (LIPITOR) 80 MG tablet Take 1 tablet by mouth nightly 90 tablet 1    Handicap Placard MISC by Does not apply route Expires 5 year from date of prescription 1 each 0    aspirin 81 MG tablet Take 1 tablet by

## 2025-05-21 ENCOUNTER — OFFICE VISIT (OUTPATIENT)
Dept: ENT CLINIC | Age: 70
End: 2025-05-21
Payer: MEDICARE

## 2025-05-21 VITALS
WEIGHT: 175.3 LBS | SYSTOLIC BLOOD PRESSURE: 136 MMHG | OXYGEN SATURATION: 95 % | RESPIRATION RATE: 16 BRPM | TEMPERATURE: 97.7 F | BODY MASS INDEX: 27.51 KG/M2 | DIASTOLIC BLOOD PRESSURE: 80 MMHG | HEIGHT: 67 IN | HEART RATE: 61 BPM

## 2025-05-21 DIAGNOSIS — H93.8X2 PRESSURE SENSATION IN LEFT EAR: ICD-10-CM

## 2025-05-21 DIAGNOSIS — H65.22 LEFT CHRONIC SEROUS OTITIS MEDIA: Primary | ICD-10-CM

## 2025-05-21 DIAGNOSIS — Z97.4 WEARS HEARING AID IN BOTH EARS: ICD-10-CM

## 2025-05-21 PROCEDURE — 99204 OFFICE O/P NEW MOD 45 MIN: CPT

## 2025-05-21 PROCEDURE — 3017F COLORECTAL CA SCREEN DOC REV: CPT

## 2025-05-21 PROCEDURE — 3075F SYST BP GE 130 - 139MM HG: CPT

## 2025-05-21 PROCEDURE — G8417 CALC BMI ABV UP PARAM F/U: HCPCS

## 2025-05-21 PROCEDURE — 3079F DIAST BP 80-89 MM HG: CPT

## 2025-05-21 PROCEDURE — 1123F ACP DISCUSS/DSCN MKR DOCD: CPT

## 2025-05-21 PROCEDURE — 1036F TOBACCO NON-USER: CPT

## 2025-05-21 PROCEDURE — G8427 DOCREV CUR MEDS BY ELIG CLIN: HCPCS

## 2025-05-21 NOTE — PATIENT INSTRUCTIONS
Flonase or fluticasone nasal spray 1 spray each nostril twice a day. Then Augmentin 1 tablets twice a day for 10 days.    Sweet oil couple times a week

## 2025-07-01 ENCOUNTER — HOSPITAL ENCOUNTER (OUTPATIENT)
Dept: AUDIOLOGY | Age: 70
Discharge: HOME | End: 2025-07-01
Payer: MEDICARE

## 2025-07-01 DIAGNOSIS — E78.2 HYPERCHOLESTEROLEMIA WITH HYPERTRIGLYCERIDEMIA: ICD-10-CM

## 2025-07-01 DIAGNOSIS — G89.29 CHRONIC LEFT SHOULDER PAIN: ICD-10-CM

## 2025-07-01 DIAGNOSIS — E03.9 HYPOTHYROIDISM, UNSPECIFIED TYPE: ICD-10-CM

## 2025-07-01 DIAGNOSIS — G62.9 PERIPHERAL POLYNEUROPATHY: ICD-10-CM

## 2025-07-01 DIAGNOSIS — M25.512 CHRONIC LEFT SHOULDER PAIN: ICD-10-CM

## 2025-07-01 PROCEDURE — 92567 TYMPANOMETRY: CPT | Performed by: AUDIOLOGIST

## 2025-07-01 PROCEDURE — 92557 COMPREHENSIVE HEARING TEST: CPT | Performed by: AUDIOLOGIST

## 2025-07-01 RX ORDER — LEVOTHYROXINE SODIUM 25 UG/1
TABLET ORAL
Qty: 90 TABLET | Refills: 1 | Status: SHIPPED | OUTPATIENT
Start: 2025-07-01

## 2025-07-01 RX ORDER — ATORVASTATIN CALCIUM 80 MG/1
80 TABLET, FILM COATED ORAL NIGHTLY
Qty: 90 TABLET | Refills: 1 | Status: SHIPPED | OUTPATIENT
Start: 2025-07-01

## 2025-07-01 RX ORDER — GABAPENTIN 300 MG/1
300 CAPSULE ORAL 2 TIMES DAILY
Qty: 360 CAPSULE | Refills: 0 | Status: SHIPPED | OUTPATIENT
Start: 2025-07-01 | End: 2025-12-28

## 2025-07-01 RX ORDER — LEVOTHYROXINE SODIUM 25 UG/1
TABLET ORAL
Qty: 90 TABLET | Refills: 1 | OUTPATIENT
Start: 2025-07-01

## 2025-07-01 NOTE — PROGRESS NOTES
AUDIOLOGICAL EVALUATION      REASON FOR TESTING:  Audiometric evaluation per the request of Lauryn Mills PA-C, due to the diagnosis of left chronic serous otitis media, pressure sensation in left ear and wears hearing aid in both ears. The patient states that he feels that there is fluid in his left ear. He increases the volume on his left hearing aid. He experiences intermittent tinnitus in the left ear. Episodic imbalance. He has had previous ear tubes in both ears, more in his left ear than his right ear. Dr. Elam completed a myringotomy on the left side 01/12/2023.     Previous results on 1/19/2023: Mild to profound mostly sensorineural hearing loss, bilaterally. A mixed component is present at 500 Hz in both ear and at 7260-0843 Hz in the right ear. Asymmetrical hearing loss greater int he left ear than the right ear at 1718-6836 Hz. Word recognition ability is poor at 60% in each ear. 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     OTOSCOPY: Clear canal with abnormal appearing tympanic membrane for both ears.     AUDIOGRAM        Reliability: Good    COMMENTS: Pure tone audiometry revealed moderately-severe low frequency sensorineural hearing loss, rising to mild hearing loss at 1000 Hz and then sloping to severe high frequency sensorineural hearing loss for the right ear. Pure tone audiometry revealed moderately-severe low frequency mixed hearing loss, rising to moderate mixed hearing loss, and then sloping to severe high frequency sensorineural hearing loss for the left ear.  Word recognition ability is fair at 72% for the right ear and poor at 68% for the left ear. Tympanometry revealed normal ear canal volume and normal middle

## 2025-07-01 NOTE — TELEPHONE ENCOUNTER
Zeke Orellana called requesting a refill on the following medications:  Requested Prescriptions     Pending Prescriptions Disp Refills    atorvastatin (LIPITOR) 80 MG tablet 90 tablet 1     Sig: Take 1 tablet by mouth nightly    levothyroxine (SYNTHROID) 25 MCG tablet 90 tablet 1     Sig: TAKE 1 TABLET BY MOUTH ONCE DAILY ON AN EMPTY STOMACH WITH  WATER  AND  WAIT  30  MINUTES  BEFORE  EATING  OR  TAKING  OTHER  MEDS       Date of last visit: 3/24/2025  Date of next visit (if applicable):7/28/2025  Pharmacy Name: [facility],  Erika Pelaez MA

## 2025-07-03 ENCOUNTER — APPOINTMENT (OUTPATIENT)
Dept: CT IMAGING | Age: 70
End: 2025-07-03
Payer: MEDICARE

## 2025-07-03 ENCOUNTER — HOSPITAL ENCOUNTER (EMERGENCY)
Age: 70
Discharge: HOME | End: 2025-07-03
Payer: MEDICARE

## 2025-07-03 VITALS
WEIGHT: 170 LBS | RESPIRATION RATE: 17 BRPM | BODY MASS INDEX: 27.03 KG/M2 | DIASTOLIC BLOOD PRESSURE: 90 MMHG | TEMPERATURE: 98 F | HEART RATE: 97 BPM | SYSTOLIC BLOOD PRESSURE: 183 MMHG | OXYGEN SATURATION: 98 %

## 2025-07-03 DIAGNOSIS — T78.3XXA ANGIOEDEMA, INITIAL ENCOUNTER: ICD-10-CM

## 2025-07-03 DIAGNOSIS — R22.0 LIP SWELLING: Primary | ICD-10-CM

## 2025-07-03 LAB
ANION GAP SERPL CALC-SCNC: 13 MEQ/L (ref 8–16)
BASOPHILS ABSOLUTE: 0 THOU/MM3 (ref 0–0.1)
BASOPHILS NFR BLD AUTO: 0.5 %
BUN SERPL-MCNC: 20 MG/DL (ref 8–23)
CALCIUM SERPL-MCNC: 9 MG/DL (ref 8.8–10.2)
CHLORIDE SERPL-SCNC: 107 MEQ/L (ref 98–111)
CO2 SERPL-SCNC: 21 MEQ/L (ref 22–29)
CREAT SERPL-MCNC: 1.3 MG/DL (ref 0.7–1.2)
DEPRECATED RDW RBC AUTO: 41.3 FL (ref 35–45)
EOSINOPHIL NFR BLD AUTO: 4.5 %
EOSINOPHILS ABSOLUTE: 0.4 THOU/MM3 (ref 0–0.4)
ERYTHROCYTE [DISTWIDTH] IN BLOOD BY AUTOMATED COUNT: 13.2 % (ref 11.5–14.5)
GFR SERPL CREATININE-BSD FRML MDRD: 59 ML/MIN/1.73M2
GLUCOSE SERPL-MCNC: 145 MG/DL (ref 74–109)
HCT VFR BLD AUTO: 47.1 % (ref 42–52)
HGB BLD-MCNC: 15.6 GM/DL (ref 14–18)
IMM GRANULOCYTES # BLD AUTO: 0.03 THOU/MM3 (ref 0–0.07)
IMM GRANULOCYTES NFR BLD AUTO: 0.4 %
LYMPHOCYTES ABSOLUTE: 2.2 THOU/MM3 (ref 1–4.8)
LYMPHOCYTES NFR BLD AUTO: 28.1 %
MCH RBC QN AUTO: 28.5 PG (ref 26–33)
MCHC RBC AUTO-ENTMCNC: 33.1 GM/DL (ref 32.2–35.5)
MCV RBC AUTO: 85.9 FL (ref 80–94)
MONOCYTES ABSOLUTE: 0.8 THOU/MM3 (ref 0.4–1.3)
MONOCYTES NFR BLD AUTO: 9.7 %
NEUTROPHILS ABSOLUTE: 4.4 THOU/MM3 (ref 1.8–7.7)
NEUTROPHILS NFR BLD AUTO: 56.8 %
NRBC BLD AUTO-RTO: 0 /100 WBC
OSMOLALITY SERPL CALC.SUM OF ELEC: 286.5 MOSMOL/KG (ref 275–300)
PLATELET # BLD AUTO: 271 THOU/MM3 (ref 130–400)
PMV BLD AUTO: 10.8 FL (ref 9.4–12.4)
POTASSIUM SERPL-SCNC: 3.7 MEQ/L (ref 3.5–5.2)
RBC # BLD AUTO: 5.48 MILL/MM3 (ref 4.7–6.1)
SODIUM SERPL-SCNC: 141 MEQ/L (ref 135–145)
WBC # BLD AUTO: 7.8 THOU/MM3 (ref 4.8–10.8)

## 2025-07-03 PROCEDURE — 96374 THER/PROPH/DIAG INJ IV PUSH: CPT

## 2025-07-03 PROCEDURE — 36415 COLL VENOUS BLD VENIPUNCTURE: CPT

## 2025-07-03 PROCEDURE — 6360000002 HC RX W HCPCS: Performed by: NURSE PRACTITIONER

## 2025-07-03 PROCEDURE — 96375 TX/PRO/DX INJ NEW DRUG ADDON: CPT

## 2025-07-03 PROCEDURE — 70487 CT MAXILLOFACIAL W/DYE: CPT

## 2025-07-03 PROCEDURE — 85025 COMPLETE CBC W/AUTO DIFF WBC: CPT

## 2025-07-03 PROCEDURE — 2500000003 HC RX 250 WO HCPCS: Performed by: NURSE PRACTITIONER

## 2025-07-03 PROCEDURE — 6360000004 HC RX CONTRAST MEDICATION: Performed by: NURSE PRACTITIONER

## 2025-07-03 PROCEDURE — 80048 BASIC METABOLIC PNL TOTAL CA: CPT

## 2025-07-03 PROCEDURE — 99285 EMERGENCY DEPT VISIT HI MDM: CPT

## 2025-07-03 RX ORDER — DIPHENHYDRAMINE HYDROCHLORIDE 50 MG/ML
50 INJECTION, SOLUTION INTRAMUSCULAR; INTRAVENOUS ONCE
Status: COMPLETED | OUTPATIENT
Start: 2025-07-03 | End: 2025-07-03

## 2025-07-03 RX ORDER — CETIRIZINE HYDROCHLORIDE 10 MG/1
10 TABLET ORAL ONCE
Status: DISCONTINUED | OUTPATIENT
Start: 2025-07-03 | End: 2025-07-04 | Stop reason: HOSPADM

## 2025-07-03 RX ORDER — IOPAMIDOL 755 MG/ML
80 INJECTION, SOLUTION INTRAVASCULAR
Status: COMPLETED | OUTPATIENT
Start: 2025-07-03 | End: 2025-07-03

## 2025-07-03 RX ORDER — CETIRIZINE HYDROCHLORIDE 10 MG/1
10 TABLET ORAL 2 TIMES DAILY
Qty: 60 TABLET | Refills: 0 | Status: SHIPPED | OUTPATIENT
Start: 2025-07-03 | End: 2025-08-02

## 2025-07-03 RX ORDER — PREDNISONE 50 MG/1
50 TABLET ORAL DAILY
Qty: 5 TABLET | Refills: 0 | Status: SHIPPED | OUTPATIENT
Start: 2025-07-03 | End: 2025-07-08

## 2025-07-03 RX ADMIN — IOPAMIDOL 80 ML: 755 INJECTION, SOLUTION INTRAVENOUS at 22:05

## 2025-07-03 RX ADMIN — DIPHENHYDRAMINE HYDROCHLORIDE 50 MG: 50 INJECTION INTRAMUSCULAR; INTRAVENOUS at 20:46

## 2025-07-03 RX ADMIN — WATER 125 MG: 1 INJECTION INTRAMUSCULAR; INTRAVENOUS; SUBCUTANEOUS at 20:46

## 2025-07-03 ASSESSMENT — PAIN - FUNCTIONAL ASSESSMENT: PAIN_FUNCTIONAL_ASSESSMENT: 0-10

## 2025-07-03 ASSESSMENT — PAIN SCALES - GENERAL: PAINLEVEL_OUTOF10: 6

## 2025-07-04 NOTE — DISCHARGE INSTRUCTIONS
It is difficult to tell if this is angioedema or an allergic reaction.  They are treated/monitored very similarly.      ER Discharge Instructions for Angioedema  Diagnosis: Angioedema (swelling under the skin)  What is Angioedema?  Angioedema is swelling that occurs beneath the skin, often around the eyes, lips, throat, or other parts of the body. It can occur as a result of an allergic reaction, but it can also be triggered by medications or other underlying causes (e.g., hereditary angioedema, infections, or autoimmune disorders).    Home Care Instructions:  Medications:  Antihistamines (e.g., diphenhydramine (Benadryl), loratadine (Claritin)) may be prescribed to reduce swelling and itching.  Corticosteroids (e.g., prednisone) may be prescribed if swelling is more severe or persistent.  If you were given an epinephrine injection (e.g., EpiPen), follow up with your doctor, especially if you have a history of severe reactions.  Observation:  Monitor your symptoms: Swelling should begin to decrease within a few hours to a few days. If swelling continues to worsen or affects your breathing, seek immediate medical care.  Avoid triggers: If your angioedema is triggered by a known allergen (e.g., certain foods, medications, or environmental factors), try to avoid those triggers.  Lifestyle Tips:  Elevate your head if the swelling involves your face or throat to help reduce discomfort.  Stay hydrated and avoid strenuous activities that might exacerbate the swelling.    When to Seek Immediate Medical Attention:  Severe swelling in the throat or difficulty breathing.  Worsening of symptoms despite taking prescribed medications.  Feeling lightheaded, dizzy, or fainting.  New rashes, hives, or other symptoms that might indicate a severe allergic reaction (anaphylaxis).    Follow-Up:  Primary Care or Allergist: Schedule a follow-up appointment within the next few days to assess your recovery and discuss prevention of future

## 2025-07-04 NOTE — ED TRIAGE NOTES
Patient presents to ED with chief complaint allergic reaction (lip swelling). Patient states that symptoms started two days ago after he ate a pastry. Patient states that he has had these pastries in the past and they never caused a problem. Symptoms started as a blister, but have progressed to severe top and bottom lip swelling. Denies any swelling of the throat or trouble breathing. Patient resting in bed. Respirations easy and unlabored. No distress noted. Call light within reach.

## 2025-07-04 NOTE — ED PROVIDER NOTES
[facility]        Note to patient: The 21st Century Cures Act requires that medical notes like this one to be available to patients in the interest of transparency. Please be advised, this is a medical document. It is intended as kots-he-miub communication. It is written in medical language and may contain abbreviations and verbiage that may be unfamiliar. It may appear to read blunt or direct. Medical documents are intended to carry relevant medical information, facts as evident and the clinical opinion of the practitioner.     EMERGENCY MEDICINE     Pt Name: Zeke Orellana  MRN: [id number]  Birthdate 1955  Date of evaluation: 7/3/2025  Provider: HEVER Chung CNP    CHIEF COMPLAINT       Chief Complaint   Patient presents with    Allergic Reaction     Swollen lips     HISTORY OF PRESENT ILLNESS   Zeke Orellana is a pleasant 70 y.o. male who presents to the emergency department from home with c/o right lip swelling--upper and lower.  Patient states he initially noted the swelling two days ago.  States that he noted it to be tender and swollen.  The patient denies swelling of the tongue or inner surfaces of the mouth.  NO trouble swallowing, no drooling.  Patient states that today he ate cajun peanuts and a pastry then after that he noted his upper lip to become sore and his lip to be swollen.        History is obtained from:  patient  PASTMEDICAL HISTORY     Past Medical History:   Diagnosis Date    Ankylosing spondylitis (HCC)     Current moderate episode of major depressive disorder without prior episode (HCC) 09/19/2019    Migraine     Pre-syncope 10/02/2023    TIA (transient ischemic attack)     TIA involving carotid artery 06/25/2016    Presumably symptoms are secondary to plaque rupture in the right carotid       Patient Active Problem List   Diagnosis Code    Peripheral neuropathy G62.9    Vitamin B12 deficiency neuropathy E53.8, G63    Hypercholesterolemia with

## 2025-07-08 ENCOUNTER — OFFICE VISIT (OUTPATIENT)
Dept: ENT CLINIC | Age: 70
End: 2025-07-08
Payer: MEDICARE

## 2025-07-08 ENCOUNTER — HOSPITAL ENCOUNTER (OUTPATIENT)
Age: 70
Discharge: HOME | End: 2025-07-08
Payer: MEDICARE

## 2025-07-08 ENCOUNTER — PREP FOR PROCEDURE (OUTPATIENT)
Dept: ENT CLINIC | Age: 70
End: 2025-07-08

## 2025-07-08 VITALS
HEIGHT: 67 IN | TEMPERATURE: 98.6 F | BODY MASS INDEX: 27.2 KG/M2 | OXYGEN SATURATION: 94 % | DIASTOLIC BLOOD PRESSURE: 78 MMHG | WEIGHT: 173.3 LBS | HEART RATE: 82 BPM | SYSTOLIC BLOOD PRESSURE: 138 MMHG | RESPIRATION RATE: 18 BRPM

## 2025-07-08 DIAGNOSIS — Z01.818 PREOP TESTING: Primary | ICD-10-CM

## 2025-07-08 DIAGNOSIS — H69.93 ETD (EUSTACHIAN TUBE DYSFUNCTION), BILATERAL: ICD-10-CM

## 2025-07-08 DIAGNOSIS — Z97.4 WEARS HEARING AID IN BOTH EARS: ICD-10-CM

## 2025-07-08 DIAGNOSIS — H69.93 DYSFUNCTION OF BOTH EUSTACHIAN TUBES: Primary | ICD-10-CM

## 2025-07-08 DIAGNOSIS — Z01.818 PREOP TESTING: ICD-10-CM

## 2025-07-08 LAB
EKG ATRIAL RATE: 68 BPM
EKG P AXIS: 59 DEGREES
EKG P-R INTERVAL: 228 MS
EKG Q-T INTERVAL: 402 MS
EKG QRS DURATION: 104 MS
EKG QTC CALCULATION (BAZETT): 427 MS
EKG R AXIS: -23 DEGREES
EKG T AXIS: 67 DEGREES
EKG VENTRICULAR RATE: 68 BPM

## 2025-07-08 PROCEDURE — 1159F MED LIST DOCD IN RCRD: CPT | Performed by: OTOLARYNGOLOGY

## 2025-07-08 PROCEDURE — 3017F COLORECTAL CA SCREEN DOC REV: CPT | Performed by: OTOLARYNGOLOGY

## 2025-07-08 PROCEDURE — G8427 DOCREV CUR MEDS BY ELIG CLIN: HCPCS | Performed by: OTOLARYNGOLOGY

## 2025-07-08 PROCEDURE — 1123F ACP DISCUSS/DSCN MKR DOCD: CPT | Performed by: OTOLARYNGOLOGY

## 2025-07-08 PROCEDURE — G8417 CALC BMI ABV UP PARAM F/U: HCPCS | Performed by: OTOLARYNGOLOGY

## 2025-07-08 PROCEDURE — 1160F RVW MEDS BY RX/DR IN RCRD: CPT | Performed by: OTOLARYNGOLOGY

## 2025-07-08 PROCEDURE — 3078F DIAST BP <80 MM HG: CPT | Performed by: OTOLARYNGOLOGY

## 2025-07-08 PROCEDURE — 99213 OFFICE O/P EST LOW 20 MIN: CPT | Performed by: OTOLARYNGOLOGY

## 2025-07-08 PROCEDURE — 3075F SYST BP GE 130 - 139MM HG: CPT | Performed by: OTOLARYNGOLOGY

## 2025-07-08 PROCEDURE — 93005 ELECTROCARDIOGRAM TRACING: CPT

## 2025-07-08 PROCEDURE — 1036F TOBACCO NON-USER: CPT | Performed by: OTOLARYNGOLOGY

## 2025-07-08 NOTE — PROGRESS NOTES
[facility] [place] [facility]  [facility] EAR, NOSE AND THROAT  [address]  [address]  [facility] [address]  Dept: [phone]  Dept Fax: [phone]  Loc: [phone]    Zeke Orellana is a 70 y.o. male who was referred byNo ref. provider found for:  Chief Complaint   Patient presents with    Results     Patient here to review audio. Patient stated that he is doing well.   .    HPI:     Zeke Orellana is a 70 y.o. male who presents today for follow up.The patient states that he feels that there is fluid in his left ear. He increases the volume on his left hearing aid. He experiences intermittent tinnitus in the left ear. Episodic imbalance. He has had previous ear tubes in both ears, more in his left ear than his right ear. Dr. Elam completed a myringotomy on the left side 01/12/2023. Pure tone audiometry revealed moderately-severe low frequency sensorineural hearing loss, rising to mild hearing loss at 1000 Hz and then sloping to severe high frequency sensorineural hearing loss for the right ear. Pure tone audiometry revealed moderately-severe low frequency mixed hearing loss, rising to moderate mixed hearing loss, and then sloping to severe high frequency sensorineural hearing loss for the left ear.  Word recognition ability is fair at 72% for the right ear and poor at 68% for the left ear. Tympanometry revealed normal ear canal volume and normal middle ear compliance with negative middle ear pressure (-213 daPa) for the right ear. Tympanometry of the left ear revealed a flat tympanogram, with a normal ear canal volume, which indicates abnormal middle ear function. CT maxillofacial with contrast 7/3/2025: Nonspecific left mastoid densities. Question acute versus chronic inflammation.No other significant abnormality. Fluticasone nasal spray 1 spray each nostril twice a day and Augmentin 1 tablets twice a day for 10 days to treat chronic otitis media.      History:     Allergies

## 2025-07-15 NOTE — PROGRESS NOTES
PAT call attempted, patient unavailable, left message to please call us back at your earliest convenience; [phone]

## 2025-07-15 NOTE — PROGRESS NOTES
EKG 7/8/25 given to anesthesia for review. Per Dr. Candace nelson to proceed at the surgery center 7/21 without further intervention

## 2025-07-20 NOTE — H&P
[facility] [place] [facility]  [facility] EAR, NOSE AND THROAT  [address]  [address]  [facility] [address]  Dept: [phone]  Dept Fax: [phone]  Loc: [phone]    Zeke Orellana is a 70 y.o. male who was referred byNo ref. provider found for:  No chief complaint on file.  .    HPI:     Zeke Orellana is a 70 y.o. male who presents today for follow up.The patient states that he feels that there is fluid in his left ear. He increases the volume on his left hearing aid. He experiences intermittent tinnitus in the left ear. Episodic imbalance. He has had previous ear tubes in both ears, more in his left ear than his right ear. Dr. Elam completed a myringotomy on the left side 01/12/2023. Pure tone audiometry revealed moderately-severe low frequency sensorineural hearing loss, rising to mild hearing loss at 1000 Hz and then sloping to severe high frequency sensorineural hearing loss for the right ear. Pure tone audiometry revealed moderately-severe low frequency mixed hearing loss, rising to moderate mixed hearing loss, and then sloping to severe high frequency sensorineural hearing loss for the left ear.  Word recognition ability is fair at 72% for the right ear and poor at 68% for the left ear. Tympanometry revealed normal ear canal volume and normal middle ear compliance with negative middle ear pressure (-213 daPa) for the right ear. Tympanometry of the left ear revealed a flat tympanogram, with a normal ear canal volume, which indicates abnormal middle ear function. CT maxillofacial with contrast 7/3/2025: Nonspecific left mastoid densities. Question acute versus chronic inflammation.No other significant abnormality. Fluticasone nasal spray 1 spray each nostril twice a day and Augmentin 1 tablets twice a day for 10 days to treat chronic otitis media.      History:     Allergies   Allergen Reactions    Shellfish-Derived Products      No current facility-administered medications for

## 2025-07-21 ENCOUNTER — HOSPITAL ENCOUNTER (OUTPATIENT)
Age: 70
Setting detail: OUTPATIENT SURGERY
Discharge: HOME | End: 2025-07-21
Attending: OTOLARYNGOLOGY | Admitting: OTOLARYNGOLOGY
Payer: MEDICARE

## 2025-07-21 ENCOUNTER — ANESTHESIA (OUTPATIENT)
Dept: OPERATING ROOM | Age: 70
End: 2025-07-21
Payer: MEDICARE

## 2025-07-21 ENCOUNTER — ANESTHESIA EVENT (OUTPATIENT)
Dept: OPERATING ROOM | Age: 70
End: 2025-07-21
Payer: MEDICARE

## 2025-07-21 VITALS
OXYGEN SATURATION: 93 % | TEMPERATURE: 97.9 F | WEIGHT: 171 LBS | BODY MASS INDEX: 27.48 KG/M2 | RESPIRATION RATE: 12 BRPM | HEART RATE: 53 BPM | HEIGHT: 66 IN | SYSTOLIC BLOOD PRESSURE: 152 MMHG | DIASTOLIC BLOOD PRESSURE: 70 MMHG

## 2025-07-21 PROCEDURE — 3700000000 HC ANESTHESIA ATTENDED CARE: Performed by: OTOLARYNGOLOGY

## 2025-07-21 PROCEDURE — 7100000010 HC PHASE II RECOVERY - FIRST 15 MIN: Performed by: OTOLARYNGOLOGY

## 2025-07-21 PROCEDURE — 7100000011 HC PHASE II RECOVERY - ADDTL 15 MIN: Performed by: OTOLARYNGOLOGY

## 2025-07-21 PROCEDURE — 2709999900 HC NON-CHARGEABLE SUPPLY: Performed by: OTOLARYNGOLOGY

## 2025-07-21 PROCEDURE — 3600000012 HC SURGERY LEVEL 2 ADDTL 15MIN: Performed by: OTOLARYNGOLOGY

## 2025-07-21 PROCEDURE — 6360000002 HC RX W HCPCS: Performed by: NURSE ANESTHETIST, CERTIFIED REGISTERED

## 2025-07-21 PROCEDURE — 69436 CREATE EARDRUM OPENING: CPT | Performed by: OTOLARYNGOLOGY

## 2025-07-21 PROCEDURE — 3700000001 HC ADD 15 MINUTES (ANESTHESIA): Performed by: OTOLARYNGOLOGY

## 2025-07-21 PROCEDURE — L8699 PROSTHETIC IMPLANT NOS: HCPCS | Performed by: OTOLARYNGOLOGY

## 2025-07-21 PROCEDURE — 6370000000 HC RX 637 (ALT 250 FOR IP): Performed by: OTOLARYNGOLOGY

## 2025-07-21 PROCEDURE — 3600000002 HC SURGERY LEVEL 2 BASE: Performed by: OTOLARYNGOLOGY

## 2025-07-21 PROCEDURE — 2580000003 HC RX 258: Performed by: NURSE ANESTHETIST, CERTIFIED REGISTERED

## 2025-07-21 PROCEDURE — 7100000000 HC PACU RECOVERY - FIRST 15 MIN: Performed by: OTOLARYNGOLOGY

## 2025-07-21 PROCEDURE — 7100000001 HC PACU RECOVERY - ADDTL 15 MIN: Performed by: OTOLARYNGOLOGY

## 2025-07-21 DEVICE — TUBE MYR DIA1.14MM 0.045 BVL FLROPLAS VENT ARMSTR GRMMT: Type: IMPLANTABLE DEVICE | Site: EAR | Status: FUNCTIONAL

## 2025-07-21 RX ORDER — SODIUM CHLORIDE 0.9 % (FLUSH) 0.9 %
5-40 SYRINGE (ML) INJECTION EVERY 12 HOURS SCHEDULED
Status: DISCONTINUED | OUTPATIENT
Start: 2025-07-21 | End: 2025-07-21 | Stop reason: HOSPADM

## 2025-07-21 RX ORDER — SODIUM CHLORIDE 9 MG/ML
INJECTION, SOLUTION INTRAVENOUS PRN
Status: DISCONTINUED | OUTPATIENT
Start: 2025-07-21 | End: 2025-07-21 | Stop reason: HOSPADM

## 2025-07-21 RX ORDER — SODIUM CHLORIDE 0.9 % (FLUSH) 0.9 %
5-40 SYRINGE (ML) INJECTION PRN
Status: DISCONTINUED | OUTPATIENT
Start: 2025-07-21 | End: 2025-07-21 | Stop reason: HOSPADM

## 2025-07-21 RX ORDER — FENTANYL CITRATE 50 UG/ML
INJECTION, SOLUTION INTRAMUSCULAR; INTRAVENOUS
Status: DISCONTINUED | OUTPATIENT
Start: 2025-07-21 | End: 2025-07-21 | Stop reason: SDUPTHER

## 2025-07-21 RX ORDER — SODIUM CHLORIDE 9 MG/ML
INJECTION, SOLUTION INTRAVENOUS CONTINUOUS
Status: DISCONTINUED | OUTPATIENT
Start: 2025-07-21 | End: 2025-07-21 | Stop reason: HOSPADM

## 2025-07-21 RX ORDER — CIPROFLOXACIN HYDROCHLORIDE 3.5 MG/ML
SOLUTION/ DROPS TOPICAL PRN
Status: DISCONTINUED | OUTPATIENT
Start: 2025-07-21 | End: 2025-07-21 | Stop reason: ALTCHOICE

## 2025-07-21 RX ORDER — SODIUM CHLORIDE 9 MG/ML
INJECTION, SOLUTION INTRAVENOUS
Status: DISCONTINUED | OUTPATIENT
Start: 2025-07-21 | End: 2025-07-21 | Stop reason: SDUPTHER

## 2025-07-21 RX ORDER — PROPOFOL 10 MG/ML
INJECTION, EMULSION INTRAVENOUS
Status: DISCONTINUED | OUTPATIENT
Start: 2025-07-21 | End: 2025-07-21 | Stop reason: SDUPTHER

## 2025-07-21 RX ORDER — ONDANSETRON 2 MG/ML
INJECTION INTRAMUSCULAR; INTRAVENOUS
Status: DISCONTINUED | OUTPATIENT
Start: 2025-07-21 | End: 2025-07-21 | Stop reason: SDUPTHER

## 2025-07-21 RX ORDER — LIDOCAINE HYDROCHLORIDE 20 MG/ML
INJECTION, SOLUTION INTRAVENOUS
Status: DISCONTINUED | OUTPATIENT
Start: 2025-07-21 | End: 2025-07-21 | Stop reason: SDUPTHER

## 2025-07-21 RX ADMIN — SODIUM CHLORIDE: 9 INJECTION, SOLUTION INTRAVENOUS at 09:39

## 2025-07-21 RX ADMIN — PROPOFOL 200 MG: 10 INJECTION, EMULSION INTRAVENOUS at 09:44

## 2025-07-21 RX ADMIN — LIDOCAINE HYDROCHLORIDE 100 MG: 20 INJECTION, SOLUTION INTRAVENOUS at 09:44

## 2025-07-21 RX ADMIN — FENTANYL CITRATE 50 MCG: 50 INJECTION, SOLUTION INTRAMUSCULAR; INTRAVENOUS at 09:44

## 2025-07-21 RX ADMIN — ONDANSETRON 4 MG: 2 INJECTION INTRAMUSCULAR; INTRAVENOUS at 09:54

## 2025-07-21 ASSESSMENT — PAIN - FUNCTIONAL ASSESSMENT: PAIN_FUNCTIONAL_ASSESSMENT: 0-10

## 2025-07-21 NOTE — ANESTHESIA POSTPROCEDURE EVALUATION
Department of Anesthesiology  Postprocedure Note    Patient: Zeke Orellana  MRN: [id number]  YOB: 1955  Date of evaluation: 7/21/2025    Procedure Summary       Date: 07/21/25 Room / Location: [address]    Anesthesia Start: 0939 Anesthesia Stop: 1005    Procedure: Bilateral myringotomy and tympanostomy tube placement (Bilateral: Ear) Diagnosis:       ETD (Eustachian tube dysfunction), bilateral      (ETD (Eustachian tube dysfunction), bilateral [H69.93])    Surgeons: Jes Nolasco MD Responsible Provider: Riley Antoine MD    Anesthesia Type: General ASA Status: 3            Anesthesia Type: General    Damari Phase I: Damari Score: 10    Damari Phase II: Damari Score: 10    Anesthesia Post Evaluation    Patient location during evaluation: PACU  Patient participation: complete - patient participated  Level of consciousness: awake and alert  Airway patency: patent  Nausea & Vomiting: no nausea  Cardiovascular status: blood pressure returned to baseline and hemodynamically stable  Respiratory status: acceptable and spontaneous ventilation  Hydration status: euvolemic  Pain management: adequate    No notable events documented.

## 2025-07-21 NOTE — PROGRESS NOTES
1003- Patient arrived to Phase I via bed, report from Nixon SIEGEL and Criss LEE. Patient remains asleep with oral airway in place. VSS. IV infusing KVO, SCD's in place.    1005 - VSS.    1010- VSS. Patient awake and alert, oral airway removed.    1015- VSS.    1020- VSS.    1025- VSS. Patient resting comfortably.    1030- VSS.    1035- VSS. Patient awake and alert without complaints    1036 - Patient transitioned to Phase II. Positioned for comfort, drink and snack provided. Call light in reach.    1100 - Discharge instructions reviewed with patient and daughter, Lana in room.    1103 - Patient sat edge of bed, tolerated well. IV removed. Patient dressed.    1108 - Patient discharged ambulatory to private vehicle with daughter.

## 2025-07-21 NOTE — ANESTHESIA PRE PROCEDURE
Department of Anesthesiology  Preprocedure Note       Name:  Zeke Orellana   Age:  70 y.o.  :  1955                                          MRN:  [id number]         Date:  2025      Surgeon: Surgeon(s):  Jes Nolasco MD    Procedure: Procedure(s):  Bilateral myringotomy and tympanostomy tube placement    Medications prior to admission:   Prior to Admission medications    Medication Sig Start Date End Date Taking? Authorizing Provider   gabapentin (NEURONTIN) 300 MG capsule Take 1 capsule by mouth in the morning and at bedtime for 180 days. 25 Yes Blessing Herrera APRN - CNP   atorvastatin (LIPITOR) 80 MG tablet Take 1 tablet by mouth nightly 25  Yes Blessing Herrera APRN - CNP   levothyroxine (SYNTHROID) 25 MCG tablet TAKE 1 TABLET BY MOUTH ONCE DAILY ON AN EMPTY STOMACH WITH  WATER  AND  WAIT  30  MINUTES  BEFORE  EATING  OR  TAKING  OTHER  MEDS 25  Yes Blessing Herrera APRN - CNP   amLODIPine (NORVASC) 10 MG tablet Take 1 tablet by mouth once daily 25  Yes Blessing Herrera APRN - CNP   fenofibrate 160 MG tablet Take 1 tablet by mouth daily 25  Yes Blessing Herrera APRN - CNP   aspirin 81 MG tablet Take 1 tablet by mouth daily   Yes Provider, MD Jazmin   cetirizine (ZYRTEC) 10 MG tablet Take 1 tablet by mouth in the morning and at bedtime  Patient not taking: Reported on 2025 7/3/25 8/2/25  Cynthia Sanchez APRN - CNP   albuterol sulfate HFA (VENTOLIN HFA) 108 (90 Base) MCG/ACT inhaler Inhale 2 puffs into the lungs 4 times daily as needed for Wheezing  Patient not taking: Reported on 2025 3/24/25   Blessing Herrera APRN - CNP       Current medications:    Current Facility-Administered Medications   Medication Dose Route Frequency Provider Last Rate Last Admin   • sodium chloride flush 0.9 % injection 5-40 mL  5-40 mL IntraVENous 2 times per day Jes Nolasco MD       • sodium chloride flush 0.9 % injection 5-40 mL  5-40 mL IntraVENous PRN Gaurav

## 2025-07-21 NOTE — OP NOTE
ventilation tube was placed without difficulty.     The patient was turned back to the anesthesia care in a stable condition.      Electronically signed by Jes Nolasco MD on 7/21/2025 at 10:07 AM

## 2025-07-21 NOTE — DISCHARGE INSTRUCTIONS
Use antibiotic ear drops as followin drops each ear twice daily for 3 days.    Take Tylenol or Ibuprofen for pain.    Follow up as scheduled.

## 2025-07-21 NOTE — PROGRESS NOTES
Pt. Admitted in stable condition. Consent signed. VS obtained and WNL. INT inserted without complications.  Pt. Denies all other needs. Warm blanket provided. Call light left within reach.

## 2025-07-28 ENCOUNTER — OFFICE VISIT (OUTPATIENT)
Dept: FAMILY MEDICINE CLINIC | Age: 70
End: 2025-07-28
Payer: MEDICARE

## 2025-07-28 VITALS
SYSTOLIC BLOOD PRESSURE: 160 MMHG | TEMPERATURE: 97.9 F | HEIGHT: 66 IN | WEIGHT: 173.4 LBS | RESPIRATION RATE: 14 BRPM | OXYGEN SATURATION: 95 % | DIASTOLIC BLOOD PRESSURE: 70 MMHG | BODY MASS INDEX: 27.87 KG/M2 | HEART RATE: 85 BPM

## 2025-07-28 DIAGNOSIS — I10 ESSENTIAL HYPERTENSION: Primary | ICD-10-CM

## 2025-07-28 DIAGNOSIS — E03.9 HYPOTHYROIDISM, UNSPECIFIED TYPE: ICD-10-CM

## 2025-07-28 DIAGNOSIS — M45.6 ANKYLOSING SPONDYLITIS OF LUMBAR REGION (HCC): ICD-10-CM

## 2025-07-28 DIAGNOSIS — E78.2 HYPERCHOLESTEROLEMIA WITH HYPERTRIGLYCERIDEMIA: ICD-10-CM

## 2025-07-28 DIAGNOSIS — G62.9 PERIPHERAL POLYNEUROPATHY: ICD-10-CM

## 2025-07-28 DIAGNOSIS — B00.1 RECURRENT COLD SORES: ICD-10-CM

## 2025-07-28 DIAGNOSIS — H69.93 ETD (EUSTACHIAN TUBE DYSFUNCTION), BILATERAL: ICD-10-CM

## 2025-07-28 DIAGNOSIS — Z12.5 SCREENING FOR PROSTATE CANCER: ICD-10-CM

## 2025-07-28 LAB
ALBUMIN SERPL BCG-MCNC: 4.2 G/DL (ref 3.4–4.9)
ALP SERPL-CCNC: 54 U/L (ref 40–129)
ALT SERPL W/O P-5'-P-CCNC: 25 U/L (ref 10–50)
ANION GAP SERPL CALC-SCNC: 15 MEQ/L (ref 8–16)
AST SERPL-CCNC: 30 U/L (ref 10–50)
BASOPHILS ABSOLUTE: 0 THOU/MM3 (ref 0–0.1)
BASOPHILS NFR BLD AUTO: 0.5 %
BILIRUB SERPL-MCNC: 0.5 MG/DL (ref 0.3–1.2)
BUN SERPL-MCNC: 16 MG/DL (ref 8–23)
CALCIUM SERPL-MCNC: 9.7 MG/DL (ref 8.8–10.2)
CHLORIDE SERPL-SCNC: 105 MEQ/L (ref 98–111)
CHOLEST SERPL-MCNC: 225 MG/DL (ref 100–199)
CO2 SERPL-SCNC: 20 MEQ/L (ref 22–29)
CREAT SERPL-MCNC: 1.4 MG/DL (ref 0.7–1.2)
DEPRECATED RDW RBC AUTO: 40.5 FL (ref 35–45)
EOSINOPHIL NFR BLD AUTO: 2.7 %
EOSINOPHILS ABSOLUTE: 0.2 THOU/MM3 (ref 0–0.4)
ERYTHROCYTE [DISTWIDTH] IN BLOOD BY AUTOMATED COUNT: 13.1 % (ref 11.5–14.5)
GFR SERPL CREATININE-BSD FRML MDRD: 54 ML/MIN/1.73M2
GLUCOSE FASTING: 168 MG/DL (ref 74–109)
HCT VFR BLD AUTO: 48.4 % (ref 42–52)
HDLC SERPL-MCNC: 32 MG/DL
HGB BLD-MCNC: 15.9 GM/DL (ref 14–18)
IMM GRANULOCYTES # BLD AUTO: 0.02 THOU/MM3 (ref 0–0.07)
IMM GRANULOCYTES NFR BLD AUTO: 0.3 %
LDLC SERPL CALC-MCNC: ABNORMAL MG/DL
LYMPHOCYTES ABSOLUTE: 1.9 THOU/MM3 (ref 1–4.8)
LYMPHOCYTES NFR BLD AUTO: 29.9 %
MAGNESIUM SERPL-MCNC: 2.2 MG/DL (ref 1.6–2.6)
MCH RBC QN AUTO: 28.3 PG (ref 26–33)
MCHC RBC AUTO-ENTMCNC: 32.9 GM/DL (ref 32.2–35.5)
MCV RBC AUTO: 86.3 FL (ref 80–94)
MONOCYTES ABSOLUTE: 0.6 THOU/MM3 (ref 0.4–1.3)
MONOCYTES NFR BLD AUTO: 8.6 %
NEUTROPHILS ABSOLUTE: 3.7 THOU/MM3 (ref 1.8–7.7)
NEUTROPHILS NFR BLD AUTO: 58 %
NRBC BLD AUTO-RTO: 0 /100 WBC
PLATELET # BLD AUTO: 267 THOU/MM3 (ref 130–400)
PMV BLD AUTO: 11.8 FL (ref 9.4–12.4)
POTASSIUM SERPL-SCNC: 3.6 MEQ/L (ref 3.5–5.2)
PROT SERPL-MCNC: 7.3 G/DL (ref 6.4–8.3)
PSA SERPL-MCNC: 0.36 NG/ML (ref 0–1)
RBC # BLD AUTO: 5.61 MILL/MM3 (ref 4.7–6.1)
SODIUM SERPL-SCNC: 140 MEQ/L (ref 135–145)
TRIGL SERPL-MCNC: 424 MG/DL (ref 0–199)
TSH SERPL DL<=0.05 MIU/L-ACNC: 2.79 UIU/ML (ref 0.27–4.2)
WBC # BLD AUTO: 6.4 THOU/MM3 (ref 4.8–10.8)

## 2025-07-28 PROCEDURE — 1123F ACP DISCUSS/DSCN MKR DOCD: CPT | Performed by: NURSE PRACTITIONER

## 2025-07-28 PROCEDURE — 1159F MED LIST DOCD IN RCRD: CPT | Performed by: NURSE PRACTITIONER

## 2025-07-28 PROCEDURE — G8417 CALC BMI ABV UP PARAM F/U: HCPCS | Performed by: NURSE PRACTITIONER

## 2025-07-28 PROCEDURE — 3078F DIAST BP <80 MM HG: CPT | Performed by: NURSE PRACTITIONER

## 2025-07-28 PROCEDURE — 1036F TOBACCO NON-USER: CPT | Performed by: NURSE PRACTITIONER

## 2025-07-28 PROCEDURE — 1160F RVW MEDS BY RX/DR IN RCRD: CPT | Performed by: NURSE PRACTITIONER

## 2025-07-28 PROCEDURE — 3017F COLORECTAL CA SCREEN DOC REV: CPT | Performed by: NURSE PRACTITIONER

## 2025-07-28 PROCEDURE — G8427 DOCREV CUR MEDS BY ELIG CLIN: HCPCS | Performed by: NURSE PRACTITIONER

## 2025-07-28 PROCEDURE — 3077F SYST BP >= 140 MM HG: CPT | Performed by: NURSE PRACTITIONER

## 2025-07-28 PROCEDURE — 99214 OFFICE O/P EST MOD 30 MIN: CPT | Performed by: NURSE PRACTITIONER

## 2025-07-28 RX ORDER — LISINOPRIL 20 MG/1
20 TABLET ORAL DAILY
Qty: 30 TABLET | Refills: 1 | Status: SHIPPED | OUTPATIENT
Start: 2025-07-28

## 2025-07-28 ASSESSMENT — ENCOUNTER SYMPTOMS
ABDOMINAL PAIN: 0
VOMITING: 0
SHORTNESS OF BREATH: 0
EYE DISCHARGE: 0
COUGH: 0
BACK PAIN: 1
CHEST TIGHTNESS: 0
DIARRHEA: 0
CONSTIPATION: 0
RHINORRHEA: 0

## 2025-07-28 NOTE — PROGRESS NOTES
Zeke Orellana (:  1955) is a 70 y.o. male, Established patient, here for evaluation of the following chief complaint(s):  6 Month Follow-Up (For got to get blood work )         Assessment & Plan  1. Hypertension.  - Blood pressure readings are slightly elevated today, with a recent reading of 164/74 mmHg.  - Currently on Norvasc; lisinopril 20 mg will be added to the regimen.  - Potential side effects, including angioedema and ACE inhibitor cough, were discussed.  - Advised to monitor blood pressure at home and bring readings to the next appointment. Blood work will be conducted today to assess PSA, thyroid function, magnesium levels, cholesterol, comprehensive metabolic panel, and CBC.    2. Ankylosing Spondylitis.  - Pain is currently managed with gabapentin.  - Does not wish to see a rheumatologist or pain management specialist at this time.  - Advised to continue with the current medication regimen.  - No new or worsening symptoms reported.    3. Peripheral Neuropathy.  - Experiences occasional leg cramping, which may be related to peripheral neuropathy.  - Neurontin taken for ankylosing spondylitis may help alleviate these symptoms.  - Advised to report any new or worsening symptoms.  - No new or worsening symptoms reported.    4. Cold Sores.  - Occasionally experiences fever blisters that resolve on their own.  - Abreva recommended for use as needed.  - If cold sores become more frequent or bothersome, a daily preventative medication can be considered.  - No new or worsening symptoms reported.    5. Allergic Reaction.  - Had a severe allergic reaction to [place] peanuts, resulting in lip and jaw swelling.  - Treated with prednisone and Benadryl at the hospital.  - Follow-up with an ENT specialist is already planned for 2025.  - No new or worsening symptoms reported.    6. Eustachian Tube Dysfunction.  - Tubes placed in both ears for eustachian tube dysfunction; reports that everything sounds

## 2025-07-28 NOTE — PROGRESS NOTES
Health Maintenance Due   Topic Date Due    DTaP/Tdap/Td vaccine (1 - Tdap) Never done    Shingles vaccine (1 of 2) Never done    COVID-19 Vaccine (3 - 2024-25 season) 09/01/2024    Lipids  03/04/2025

## 2025-07-29 ENCOUNTER — OFFICE VISIT (OUTPATIENT)
Dept: ENT CLINIC | Age: 70
End: 2025-07-29

## 2025-07-29 VITALS
OXYGEN SATURATION: 95 % | SYSTOLIC BLOOD PRESSURE: 132 MMHG | HEART RATE: 75 BPM | WEIGHT: 172.9 LBS | TEMPERATURE: 97.8 F | HEIGHT: 66 IN | BODY MASS INDEX: 27.79 KG/M2 | DIASTOLIC BLOOD PRESSURE: 64 MMHG

## 2025-07-29 DIAGNOSIS — Z98.890 STATUS POST SURGERY: Primary | ICD-10-CM

## 2025-07-29 PROCEDURE — 99024 POSTOP FOLLOW-UP VISIT: CPT | Performed by: OTOLARYNGOLOGY

## 2025-07-29 NOTE — PROGRESS NOTES
Subjective:    Pt is following up post operatively.  Procedure(s):  Bilateral myringotomy and tympanostomy tube placement  Date of Procedure: 7/21/2025   Other Findings: severe TM retraction on the left with clear effusion, right retracted and flaccid TM with no effusion.   Objective:  PE tube in place and patent.  No evidence of infection or bleeding.      Assessment and plan:  Due to severity of left TM retraction noted during the procedure, I will recheck on the Pt in 3 months.

## 2025-08-27 DIAGNOSIS — I10 ESSENTIAL HYPERTENSION: ICD-10-CM

## 2025-08-27 RX ORDER — LISINOPRIL 20 MG/1
20 TABLET ORAL DAILY
Qty: 90 TABLET | Refills: 3 | Status: SHIPPED | OUTPATIENT
Start: 2025-08-27

## 2025-08-31 DIAGNOSIS — E78.2 HYPERCHOLESTEROLEMIA WITH HYPERTRIGLYCERIDEMIA: ICD-10-CM

## 2025-09-02 RX ORDER — FENOFIBRATE 160 MG/1
160 TABLET ORAL DAILY
Qty: 90 TABLET | Refills: 0 | Status: SHIPPED | OUTPATIENT
Start: 2025-09-02

## (undated) DEVICE — INTEGRA® KNIFE 1411050 10PK MYRINGOTOMY LANCE: Brand: INTEGRA®

## (undated) DEVICE — TUBING, SUCTION, 1/4" X 12', STRAIGHT: Brand: MEDLINE

## (undated) DEVICE — GLOVE ORANGE PI 8   MSG9080

## (undated) DEVICE — GAUZE,SPONGE,4"X4",12PLY,STERILE,LF,2'S: Brand: MEDLINE

## (undated) DEVICE — STERILE COTTON BALLS LARGE 5/P: Brand: MEDLINE